# Patient Record
Sex: MALE | Race: WHITE | NOT HISPANIC OR LATINO
[De-identification: names, ages, dates, MRNs, and addresses within clinical notes are randomized per-mention and may not be internally consistent; named-entity substitution may affect disease eponyms.]

---

## 2017-01-11 ENCOUNTER — APPOINTMENT (OUTPATIENT)
Dept: ORTHOPEDIC SURGERY | Facility: CLINIC | Age: 77
End: 2017-01-11

## 2017-03-08 ENCOUNTER — APPOINTMENT (OUTPATIENT)
Dept: ORTHOPEDIC SURGERY | Facility: CLINIC | Age: 77
End: 2017-03-08

## 2017-06-14 ENCOUNTER — APPOINTMENT (OUTPATIENT)
Dept: ORTHOPEDIC SURGERY | Facility: CLINIC | Age: 77
End: 2017-06-14
Payer: COMMERCIAL

## 2017-06-14 VITALS
BODY MASS INDEX: 26.79 KG/M2 | HEART RATE: 67 BPM | HEIGHT: 76 IN | SYSTOLIC BLOOD PRESSURE: 126 MMHG | WEIGHT: 220 LBS | DIASTOLIC BLOOD PRESSURE: 84 MMHG

## 2017-06-14 PROCEDURE — 99214 OFFICE O/P EST MOD 30 MIN: CPT

## 2017-09-13 ENCOUNTER — APPOINTMENT (OUTPATIENT)
Dept: ORTHOPEDIC SURGERY | Facility: CLINIC | Age: 77
End: 2017-09-13
Payer: COMMERCIAL

## 2017-09-13 PROCEDURE — 72100 X-RAY EXAM L-S SPINE 2/3 VWS: CPT

## 2017-09-13 PROCEDURE — 99214 OFFICE O/P EST MOD 30 MIN: CPT

## 2018-03-14 ENCOUNTER — APPOINTMENT (OUTPATIENT)
Dept: ORTHOPEDIC SURGERY | Facility: CLINIC | Age: 78
End: 2018-03-14
Payer: COMMERCIAL

## 2018-03-14 DIAGNOSIS — M43.16 SPONDYLOLISTHESIS, LUMBAR REGION: ICD-10-CM

## 2018-03-14 PROCEDURE — 99214 OFFICE O/P EST MOD 30 MIN: CPT

## 2018-03-14 PROCEDURE — 72100 X-RAY EXAM L-S SPINE 2/3 VWS: CPT

## 2018-04-02 ENCOUNTER — INPATIENT (INPATIENT)
Facility: HOSPITAL | Age: 78
LOS: 8 days | Discharge: HOME | DRG: 871 | End: 2018-04-11
Attending: HOSPITALIST | Admitting: HOSPITALIST
Payer: COMMERCIAL

## 2018-04-02 VITALS
OXYGEN SATURATION: 95 % | HEART RATE: 112 BPM | HEIGHT: 77 IN | TEMPERATURE: 99 F | RESPIRATION RATE: 20 BRPM | WEIGHT: 207.01 LBS | SYSTOLIC BLOOD PRESSURE: 109 MMHG | DIASTOLIC BLOOD PRESSURE: 69 MMHG

## 2018-04-02 DIAGNOSIS — E89.0 POSTPROCEDURAL HYPOTHYROIDISM: Chronic | ICD-10-CM

## 2018-04-02 DIAGNOSIS — N39.0 URINARY TRACT INFECTION, SITE NOT SPECIFIED: ICD-10-CM

## 2018-04-02 DIAGNOSIS — R74.0 NONSPECIFIC ELEVATION OF LEVELS OF TRANSAMINASE AND LACTIC ACID DEHYDROGENASE [LDH]: ICD-10-CM

## 2018-04-02 DIAGNOSIS — A41.9 SEPSIS, UNSPECIFIED ORGANISM: ICD-10-CM

## 2018-04-02 DIAGNOSIS — I10 ESSENTIAL (PRIMARY) HYPERTENSION: ICD-10-CM

## 2018-04-02 DIAGNOSIS — E03.9 HYPOTHYROIDISM, UNSPECIFIED: ICD-10-CM

## 2018-04-02 DIAGNOSIS — Z29.9 ENCOUNTER FOR PROPHYLACTIC MEASURES, UNSPECIFIED: ICD-10-CM

## 2018-04-02 DIAGNOSIS — E87.1 HYPO-OSMOLALITY AND HYPONATREMIA: ICD-10-CM

## 2018-04-02 DIAGNOSIS — E11.8 TYPE 2 DIABETES MELLITUS WITH UNSPECIFIED COMPLICATIONS: ICD-10-CM

## 2018-04-02 DIAGNOSIS — N40.0 BENIGN PROSTATIC HYPERPLASIA WITHOUT LOWER URINARY TRACT SYMPTOMS: ICD-10-CM

## 2018-04-02 LAB
ALBUMIN SERPL ELPH-MCNC: 3.1 G/DL — LOW (ref 3.3–5)
ALP SERPL-CCNC: 192 U/L — HIGH (ref 40–120)
ALT FLD-CCNC: 60 U/L RC — HIGH (ref 10–45)
ANION GAP SERPL CALC-SCNC: 15 MMOL/L — SIGNIFICANT CHANGE UP (ref 5–17)
APPEARANCE UR: CLEAR — SIGNIFICANT CHANGE UP
APTT BLD: 23.8 SEC — LOW (ref 27.5–37.4)
AST SERPL-CCNC: 49 U/L — HIGH (ref 10–40)
BASOPHILS # BLD AUTO: 0 K/UL — SIGNIFICANT CHANGE UP (ref 0–0.2)
BILIRUB SERPL-MCNC: 1.1 MG/DL — SIGNIFICANT CHANGE UP (ref 0.2–1.2)
BILIRUB UR-MCNC: NEGATIVE — SIGNIFICANT CHANGE UP
BUN SERPL-MCNC: 29 MG/DL — HIGH (ref 7–23)
CALCIUM SERPL-MCNC: 8.8 MG/DL — SIGNIFICANT CHANGE UP (ref 8.4–10.5)
CHLORIDE SERPL-SCNC: 88 MMOL/L — LOW (ref 96–108)
CO2 SERPL-SCNC: 23 MMOL/L — SIGNIFICANT CHANGE UP (ref 22–31)
COLOR SPEC: YELLOW — SIGNIFICANT CHANGE UP
COMMENT - URINE: SIGNIFICANT CHANGE UP
CREAT SERPL-MCNC: 1.15 MG/DL — SIGNIFICANT CHANGE UP (ref 0.5–1.3)
DIFF PNL FLD: ABNORMAL
EOSINOPHIL # BLD AUTO: 0 K/UL — SIGNIFICANT CHANGE UP (ref 0–0.5)
GAS PNL BLDV: SIGNIFICANT CHANGE UP
GLUCOSE SERPL-MCNC: 241 MG/DL — HIGH (ref 70–99)
GLUCOSE UR QL: NEGATIVE — SIGNIFICANT CHANGE UP
HCT VFR BLD CALC: 38.1 % — LOW (ref 39–50)
HGB BLD-MCNC: 13 G/DL — SIGNIFICANT CHANGE UP (ref 13–17)
INR BLD: 1.21 RATIO — HIGH (ref 0.88–1.16)
KETONES UR-MCNC: NEGATIVE — SIGNIFICANT CHANGE UP
LEUKOCYTE ESTERASE UR-ACNC: NEGATIVE — SIGNIFICANT CHANGE UP
LYMPHOCYTES # BLD AUTO: 0.2 K/UL — LOW (ref 1–3.3)
LYMPHOCYTES # BLD AUTO: 1 % — LOW (ref 13–44)
MCHC RBC-ENTMCNC: 29.7 PG — SIGNIFICANT CHANGE UP (ref 27–34)
MCHC RBC-ENTMCNC: 34.2 GM/DL — SIGNIFICANT CHANGE UP (ref 32–36)
MCV RBC AUTO: 87 FL — SIGNIFICANT CHANGE UP (ref 80–100)
MONOCYTES # BLD AUTO: 1 K/UL — HIGH (ref 0–0.9)
MONOCYTES NFR BLD AUTO: 6 % — SIGNIFICANT CHANGE UP (ref 2–14)
NEUTROPHILS # BLD AUTO: 16.8 K/UL — HIGH (ref 1.8–7.4)
NEUTROPHILS NFR BLD AUTO: 90 % — HIGH (ref 43–77)
NITRITE UR-MCNC: NEGATIVE — SIGNIFICANT CHANGE UP
PH UR: 6 — SIGNIFICANT CHANGE UP (ref 5–8)
PLATELET # BLD AUTO: 166 K/UL — SIGNIFICANT CHANGE UP (ref 150–400)
POTASSIUM SERPL-MCNC: 3.5 MMOL/L — SIGNIFICANT CHANGE UP (ref 3.5–5.3)
POTASSIUM SERPL-SCNC: 3.5 MMOL/L — SIGNIFICANT CHANGE UP (ref 3.5–5.3)
PROT SERPL-MCNC: 7.2 G/DL — SIGNIFICANT CHANGE UP (ref 6–8.3)
PROT UR-MCNC: SIGNIFICANT CHANGE UP
PROTHROM AB SERPL-ACNC: 13.2 SEC — HIGH (ref 9.8–12.7)
RBC # BLD: 4.37 M/UL — SIGNIFICANT CHANGE UP (ref 4.2–5.8)
RBC # FLD: 12.3 % — SIGNIFICANT CHANGE UP (ref 10.3–14.5)
RBC CASTS # UR COMP ASSIST: SIGNIFICANT CHANGE UP /HPF (ref 0–2)
SODIUM SERPL-SCNC: 126 MMOL/L — LOW (ref 135–145)
SP GR SPEC: 1.01 — SIGNIFICANT CHANGE UP (ref 1.01–1.02)
UROBILINOGEN FLD QL: 1 MG/DL
WBC # BLD: 18 K/UL — HIGH (ref 3.8–10.5)
WBC # FLD AUTO: 18 K/UL — HIGH (ref 3.8–10.5)
WBC UR QL: SIGNIFICANT CHANGE UP /HPF (ref 0–5)

## 2018-04-02 PROCEDURE — 93010 ELECTROCARDIOGRAM REPORT: CPT

## 2018-04-02 PROCEDURE — 99285 EMERGENCY DEPT VISIT HI MDM: CPT | Mod: 25

## 2018-04-02 PROCEDURE — 99223 1ST HOSP IP/OBS HIGH 75: CPT

## 2018-04-02 PROCEDURE — 71045 X-RAY EXAM CHEST 1 VIEW: CPT | Mod: 26

## 2018-04-02 RX ORDER — INSULIN LISPRO 100/ML
VIAL (ML) SUBCUTANEOUS
Qty: 0 | Refills: 0 | Status: DISCONTINUED | OUTPATIENT
Start: 2018-04-02 | End: 2018-04-11

## 2018-04-02 RX ORDER — DEXTROSE 50 % IN WATER 50 %
12.5 SYRINGE (ML) INTRAVENOUS ONCE
Qty: 0 | Refills: 0 | Status: DISCONTINUED | OUTPATIENT
Start: 2018-04-02 | End: 2018-04-11

## 2018-04-02 RX ORDER — ATORVASTATIN CALCIUM 80 MG/1
10 TABLET, FILM COATED ORAL AT BEDTIME
Qty: 0 | Refills: 0 | Status: DISCONTINUED | OUTPATIENT
Start: 2018-04-02 | End: 2018-04-11

## 2018-04-02 RX ORDER — TAMSULOSIN HYDROCHLORIDE 0.4 MG/1
0.4 CAPSULE ORAL AT BEDTIME
Qty: 0 | Refills: 0 | Status: DISCONTINUED | OUTPATIENT
Start: 2018-04-02 | End: 2018-04-11

## 2018-04-02 RX ORDER — SODIUM CHLORIDE 9 MG/ML
1000 INJECTION INTRAMUSCULAR; INTRAVENOUS; SUBCUTANEOUS
Qty: 0 | Refills: 0 | Status: DISCONTINUED | OUTPATIENT
Start: 2018-04-02 | End: 2018-04-11

## 2018-04-02 RX ORDER — DEXTROSE 50 % IN WATER 50 %
25 SYRINGE (ML) INTRAVENOUS ONCE
Qty: 0 | Refills: 0 | Status: DISCONTINUED | OUTPATIENT
Start: 2018-04-02 | End: 2018-04-11

## 2018-04-02 RX ORDER — GLUCAGON INJECTION, SOLUTION 0.5 MG/.1ML
1 INJECTION, SOLUTION SUBCUTANEOUS ONCE
Qty: 0 | Refills: 0 | Status: DISCONTINUED | OUTPATIENT
Start: 2018-04-02 | End: 2018-04-11

## 2018-04-02 RX ORDER — SODIUM CHLORIDE 9 MG/ML
1000 INJECTION INTRAMUSCULAR; INTRAVENOUS; SUBCUTANEOUS ONCE
Qty: 0 | Refills: 0 | Status: COMPLETED | OUTPATIENT
Start: 2018-04-02 | End: 2018-04-02

## 2018-04-02 RX ORDER — SODIUM CHLORIDE 9 MG/ML
1000 INJECTION, SOLUTION INTRAVENOUS
Qty: 0 | Refills: 0 | Status: DISCONTINUED | OUTPATIENT
Start: 2018-04-02 | End: 2018-04-11

## 2018-04-02 RX ORDER — HEPARIN SODIUM 5000 [USP'U]/ML
5000 INJECTION INTRAVENOUS; SUBCUTANEOUS EVERY 8 HOURS
Qty: 0 | Refills: 0 | Status: DISCONTINUED | OUTPATIENT
Start: 2018-04-02 | End: 2018-04-11

## 2018-04-02 RX ORDER — PANTOPRAZOLE SODIUM 20 MG/1
40 TABLET, DELAYED RELEASE ORAL
Qty: 0 | Refills: 0 | Status: DISCONTINUED | OUTPATIENT
Start: 2018-04-02 | End: 2018-04-11

## 2018-04-02 RX ORDER — DEXTROSE 50 % IN WATER 50 %
1 SYRINGE (ML) INTRAVENOUS ONCE
Qty: 0 | Refills: 0 | Status: DISCONTINUED | OUTPATIENT
Start: 2018-04-02 | End: 2018-04-11

## 2018-04-02 RX ORDER — LEVOTHYROXINE SODIUM 125 MCG
125 TABLET ORAL DAILY
Qty: 0 | Refills: 0 | Status: DISCONTINUED | OUTPATIENT
Start: 2018-04-02 | End: 2018-04-11

## 2018-04-02 RX ORDER — CEFTRIAXONE 500 MG/1
1 INJECTION, POWDER, FOR SOLUTION INTRAMUSCULAR; INTRAVENOUS EVERY 24 HOURS
Qty: 0 | Refills: 0 | Status: DISCONTINUED | OUTPATIENT
Start: 2018-04-03 | End: 2018-04-04

## 2018-04-02 RX ORDER — OMEGA-3 ACID ETHYL ESTERS 1 G
4 CAPSULE ORAL DAILY
Qty: 0 | Refills: 0 | Status: DISCONTINUED | OUTPATIENT
Start: 2018-04-02 | End: 2018-04-11

## 2018-04-02 RX ORDER — CEFTRIAXONE 500 MG/1
1 INJECTION, POWDER, FOR SOLUTION INTRAMUSCULAR; INTRAVENOUS ONCE
Qty: 0 | Refills: 0 | Status: COMPLETED | OUTPATIENT
Start: 2018-04-02 | End: 2018-04-02

## 2018-04-02 RX ORDER — ASPIRIN/CALCIUM CARB/MAGNESIUM 324 MG
81 TABLET ORAL DAILY
Qty: 0 | Refills: 0 | Status: DISCONTINUED | OUTPATIENT
Start: 2018-04-02 | End: 2018-04-05

## 2018-04-02 RX ORDER — CEFTRIAXONE 500 MG/1
1 INJECTION, POWDER, FOR SOLUTION INTRAMUSCULAR; INTRAVENOUS ONCE
Qty: 0 | Refills: 0 | Status: DISCONTINUED | OUTPATIENT
Start: 2018-04-02 | End: 2018-04-02

## 2018-04-02 RX ADMIN — TAMSULOSIN HYDROCHLORIDE 0.4 MILLIGRAM(S): 0.4 CAPSULE ORAL at 21:39

## 2018-04-02 RX ADMIN — ATORVASTATIN CALCIUM 10 MILLIGRAM(S): 80 TABLET, FILM COATED ORAL at 21:39

## 2018-04-02 RX ADMIN — CEFTRIAXONE 100 GRAM(S): 500 INJECTION, POWDER, FOR SOLUTION INTRAMUSCULAR; INTRAVENOUS at 13:21

## 2018-04-02 RX ADMIN — HEPARIN SODIUM 5000 UNIT(S): 5000 INJECTION INTRAVENOUS; SUBCUTANEOUS at 21:39

## 2018-04-02 RX ADMIN — SODIUM CHLORIDE 50 MILLILITER(S): 9 INJECTION INTRAMUSCULAR; INTRAVENOUS; SUBCUTANEOUS at 21:39

## 2018-04-02 RX ADMIN — SODIUM CHLORIDE 1000 MILLILITER(S): 9 INJECTION INTRAMUSCULAR; INTRAVENOUS; SUBCUTANEOUS at 11:29

## 2018-04-02 RX ADMIN — HEPARIN SODIUM 5000 UNIT(S): 5000 INJECTION INTRAVENOUS; SUBCUTANEOUS at 17:56

## 2018-04-02 RX ADMIN — Medication 2: at 17:56

## 2018-04-02 NOTE — ED PROVIDER NOTE - CARE PLAN
Principal Discharge DX:	Urinary tract infection without hematuria, site unspecified  Secondary Diagnosis:	Hyponatremia

## 2018-04-02 NOTE — H&P ADULT - NSHPLABSRESULTS_GEN_ALL_CORE
Labs reviewed : leukocytosis, hyponatremia, UA negative for UTI ( after 3 days of PO abx), elevated LFTs    EKG personally reviewed : NSR , hr 99 bpm, no acute ischemic changes

## 2018-04-02 NOTE — ED PROVIDER NOTE - MEDICAL DECISION MAKING DETAILS
79 y/o M with pmhx of DM who is brought in by family for several days of mild mental status changes, unsteady gait in the setting of recently diagnosed UTI. Per wife, pt developed some diarrhea on Thursday 3/29 and had decreased appetite. Saw PMD on Saturday and had lab work done and was diagnosed with UTI and started on Cipro. Since then, pt has not been improving, has had urinary urgency, unable to make it to the bathroom, is not himself and has gait instability. They spoke with PMD who advised him to come to the ER for an evaluation. Pt has also been having sweats and chills and mild dry cough. Differential diagnosis: UTI, dehydration, metabolic disorder. Plan: obtain basic labs, UA, Urine culture, blood cultures. Will administer IV fluids, likely also abx and will likely admit.

## 2018-04-02 NOTE — H&P ADULT - PROBLEM SELECTOR PLAN 1
pt presents with urinary frequency and incontinence in the setting of AMS  UA currently negative for UTI after Ciprofloxacin x 3 days  will c/w Ceftriaxone 1g IVPB qd for now  follow bld cx, urine cx

## 2018-04-02 NOTE — ED PROVIDER NOTE - OBJECTIVE STATEMENT
77 y/o M with pmhx of DM, HTN, GERD  and pshx of partial thyroidectomy presents to ED with mild altered status  x4 days. Per family, pt has "not been himself lately" and has shown increased weakness and incoherency.  Associated symptoms include intermittent chills and sweats, productive cough, unsteady gait, decreased PO intake, an episode of diarrhea on 3/29/18 and the  inability to control urinary urges . Family took him to [facility] over the weekend where blood and urine work was done. Was seen by Dr. Whitfield on Saturday 3/31 and diagnosed with UTI. Pt was rx Cipro and  advised to go to the ED to get an IV, as presenting symptoms might be related to low electrolytes. Per daughter, he was given some Gatorade and oatmeal this morning to which the symptoms resolved slightly.  Pt denies vomiting, abd pain, SOB, CP, recent sick contact. Has no history of cardiac disease.  PMD: Dr. Summers [facility]

## 2018-04-02 NOTE — H&P ADULT - ASSESSMENT
78M with h/o DMT2, HTN, GERD , Hypothyroidism ( 2/2 partial thyroidectomy) who presents with mild altered status, urinary frequency and incontinence  x 4 days. He c/o associated intermittent chills and sweats, productive cough, unsteady gait and decreased PO intake. Physical exam is notable for AAO X 3, no abd tenderness , no flank tenderness. Labs are notable for leukocytosis, hyponatremia, UA negative for UTI ( after 3 days of PO abx) and elevated LFTs. EKG is NSR with no acute ischemic changes.

## 2018-04-02 NOTE — H&P ADULT - HISTORY OF PRESENT ILLNESS
78M with h/o DMT2, HTN, GERD , Hypothyroidism ( 2/2 partial thyroidectomy) who  presents with mild altered status, urinary frequency and incontinence  x 4 days. Per family, pt has speaking incoherently and appearing confused . He has also been lethargic. He reports frequency and urinary incontinence. Family  also reports pt having about 3 - 4 episodes of loose bowel movements one day prior to onset of confusion. He c/o associated intermittent chills and sweats, productive cough, unsteady gait and decreased PO intake. Pt denies nausea/vomiting, abd pain, dysuria, flank pain, SOB, CP, recent sick contact.    Family took him to [employer] over the weekend where blood and urine labs were done. He was diagnosed with UTI and started on  Ciprofloxacin which he has taken for the past 3 days. Family reports mild improvement in symptoms but today he was noted to much weaker and unsteady on his feet. They called the PMD who recommended coming to the ED for further evaluation and IV antibiotics.      Currently, per family, he appears to be close to his baseline mental status.       ED course  VS : 109/69  112  20  O2 95% on room air T 99.3F  Labs : wbc 18  h/h 13/36 plt 166 Na 126  bun/cr 29/1.15    AST 49  ALT 60  UA : nitrite (neg) , LE (neg)  Treatment : IVF NS 1L x 1, Ceftriaxone 1 g IVPB x 1

## 2018-04-02 NOTE — PATIENT PROFILE ADULT. - ABILITY TO HEAR (WITH HEARING AID OR HEARING APPLIANCE IF NORMALLY USED):
uses b/l hearing aides/Mildly to Moderately Impaired: difficulty hearing in some environments or speaker may need to increase volume or speak distinctly

## 2018-04-02 NOTE — ED PROVIDER NOTE - PMH
BPH (benign prostatic hypertrophy)    Diabetes    GERD (gastroesophageal reflux disease)    Hypertension    Overactive bladder    Spinal stenosis    Thyroid nodule

## 2018-04-02 NOTE — ED ADULT NURSE NOTE - OBJECTIVE STATEMENT
Pt is an ambulatory 78 yr old male a/oX 3 c/o weakness that started on thursday with an episode of diarrhea.  Pt was seen at urgent care and diagnosed with a UTI and placed on cipro.  PERRL wnl, ruiz with equal strength.  LUNGS CTA.  no chest pain or sob.  Denies fevers, although patient states they felt warm and diaphoretic yesterday.  Abdomen NT ND with BS+4Q.  SKIN WDI.  Peripheral pulses +2bl no edema

## 2018-04-02 NOTE — H&P ADULT - NEGATIVE CARDIOVASCULAR SYMPTOMS
no palpitations/no chest pain/no dyspnea on exertion/no peripheral edema/no orthopnea/no paroxysmal nocturnal dyspnea

## 2018-04-02 NOTE — H&P ADULT - FAMILY HISTORY
Family history of heart disease     Father  Still living? Unknown  Family history of hypertension, Age at diagnosis: Age Unknown

## 2018-04-02 NOTE — H&P ADULT - PROBLEM SELECTOR PLAN 3
meets sepsis criteria with tachycardia and leukocytosis  possible source is urine  will check CXR  f/up bld cx, urine cx  c/w Ceftriaxone  c/w IVF

## 2018-04-02 NOTE — ED PROVIDER NOTE - PROGRESS NOTE DETAILS
Discussed pt with Dr. Nevarez from [facility]. Labs show WBC 18K, Na 126, Cl 88. U/A pending at this time. Will cover with ceftriaxone for likely UTI once we obtain urine for u/a and cx. Pt will require inpt admission for IV abx and IVF given electrolyte abnormalities, weakness, gait disturbance, mental status changes and UTI. Pt accepted by Dr. Nevarez.

## 2018-04-03 ENCOUNTER — TRANSCRIPTION ENCOUNTER (OUTPATIENT)
Age: 78
End: 2018-04-03

## 2018-04-03 LAB
ALBUMIN SERPL ELPH-MCNC: 3.1 G/DL — LOW (ref 3.3–5)
ALP SERPL-CCNC: 175 U/L — HIGH (ref 40–120)
ALT FLD-CCNC: 43 U/L RC — SIGNIFICANT CHANGE UP (ref 10–45)
ANION GAP SERPL CALC-SCNC: 12 MMOL/L — SIGNIFICANT CHANGE UP (ref 5–17)
AST SERPL-CCNC: 33 U/L — SIGNIFICANT CHANGE UP (ref 10–40)
BASOPHILS # BLD AUTO: 0.02 K/UL — SIGNIFICANT CHANGE UP (ref 0–0.2)
BASOPHILS NFR BLD AUTO: 0.1 % — SIGNIFICANT CHANGE UP (ref 0–2)
BILIRUB SERPL-MCNC: 0.7 MG/DL — SIGNIFICANT CHANGE UP (ref 0.2–1.2)
BUN SERPL-MCNC: 21 MG/DL — SIGNIFICANT CHANGE UP (ref 7–23)
CALCIUM SERPL-MCNC: 8.6 MG/DL — SIGNIFICANT CHANGE UP (ref 8.4–10.5)
CHLORIDE SERPL-SCNC: 94 MMOL/L — LOW (ref 96–108)
CO2 SERPL-SCNC: 27 MMOL/L — SIGNIFICANT CHANGE UP (ref 22–31)
CREAT SERPL-MCNC: 0.98 MG/DL — SIGNIFICANT CHANGE UP (ref 0.5–1.3)
CULTURE RESULTS: NO GROWTH — SIGNIFICANT CHANGE UP
EOSINOPHIL # BLD AUTO: 0 K/UL — SIGNIFICANT CHANGE UP (ref 0–0.5)
EOSINOPHIL NFR BLD AUTO: 0 % — SIGNIFICANT CHANGE UP (ref 0–6)
GLUCOSE SERPL-MCNC: 120 MG/DL — HIGH (ref 70–99)
HBA1C BLD-MCNC: 6 % — HIGH (ref 4–5.6)
HCT VFR BLD CALC: 35.2 % — LOW (ref 39–50)
HGB BLD-MCNC: 11.5 G/DL — LOW (ref 13–17)
IMM GRANULOCYTES NFR BLD AUTO: 0.5 % — SIGNIFICANT CHANGE UP (ref 0–1.5)
LYMPHOCYTES # BLD AUTO: 0.99 K/UL — LOW (ref 1–3.3)
LYMPHOCYTES # BLD AUTO: 6.6 % — LOW (ref 13–44)
MCHC RBC-ENTMCNC: 27.8 PG — SIGNIFICANT CHANGE UP (ref 27–34)
MCHC RBC-ENTMCNC: 32.7 GM/DL — SIGNIFICANT CHANGE UP (ref 32–36)
MCV RBC AUTO: 85 FL — SIGNIFICANT CHANGE UP (ref 80–100)
MONOCYTES # BLD AUTO: 1.16 K/UL — HIGH (ref 0–0.9)
MONOCYTES NFR BLD AUTO: 7.7 % — SIGNIFICANT CHANGE UP (ref 2–14)
NEUTROPHILS # BLD AUTO: 12.86 K/UL — HIGH (ref 1.8–7.4)
NEUTROPHILS NFR BLD AUTO: 85.1 % — HIGH (ref 43–77)
PLATELET # BLD AUTO: 186 K/UL — SIGNIFICANT CHANGE UP (ref 150–400)
POTASSIUM SERPL-MCNC: 3.4 MMOL/L — LOW (ref 3.5–5.3)
POTASSIUM SERPL-SCNC: 3.4 MMOL/L — LOW (ref 3.5–5.3)
PROT SERPL-MCNC: 6.9 G/DL — SIGNIFICANT CHANGE UP (ref 6–8.3)
RBC # BLD: 4.14 M/UL — LOW (ref 4.2–5.8)
RBC # FLD: 13.8 % — SIGNIFICANT CHANGE UP (ref 10.3–14.5)
SODIUM SERPL-SCNC: 133 MMOL/L — LOW (ref 135–145)
SPECIMEN SOURCE: SIGNIFICANT CHANGE UP
WBC # BLD: 15.11 K/UL — HIGH (ref 3.8–10.5)
WBC # FLD AUTO: 15.11 K/UL — HIGH (ref 3.8–10.5)

## 2018-04-03 PROCEDURE — 74160 CT ABDOMEN W/CONTRAST: CPT | Mod: 26

## 2018-04-03 PROCEDURE — 76700 US EXAM ABDOM COMPLETE: CPT | Mod: 26

## 2018-04-03 RX ORDER — POTASSIUM CHLORIDE 20 MEQ
20 PACKET (EA) ORAL
Qty: 0 | Refills: 0 | Status: COMPLETED | OUTPATIENT
Start: 2018-04-03 | End: 2018-04-03

## 2018-04-03 RX ADMIN — Medication 20 MILLIEQUIVALENT(S): at 17:04

## 2018-04-03 RX ADMIN — TAMSULOSIN HYDROCHLORIDE 0.4 MILLIGRAM(S): 0.4 CAPSULE ORAL at 21:45

## 2018-04-03 RX ADMIN — ATORVASTATIN CALCIUM 10 MILLIGRAM(S): 80 TABLET, FILM COATED ORAL at 21:45

## 2018-04-03 RX ADMIN — CEFTRIAXONE 100 GRAM(S): 500 INJECTION, POWDER, FOR SOLUTION INTRAMUSCULAR; INTRAVENOUS at 13:19

## 2018-04-03 RX ADMIN — HEPARIN SODIUM 5000 UNIT(S): 5000 INJECTION INTRAVENOUS; SUBCUTANEOUS at 13:13

## 2018-04-03 RX ADMIN — Medication 81 MILLIGRAM(S): at 13:12

## 2018-04-03 RX ADMIN — HEPARIN SODIUM 5000 UNIT(S): 5000 INJECTION INTRAVENOUS; SUBCUTANEOUS at 05:38

## 2018-04-03 RX ADMIN — Medication 1 TABLET(S): at 13:12

## 2018-04-03 RX ADMIN — Medication 4 GRAM(S): at 13:12

## 2018-04-03 RX ADMIN — HEPARIN SODIUM 5000 UNIT(S): 5000 INJECTION INTRAVENOUS; SUBCUTANEOUS at 21:46

## 2018-04-03 RX ADMIN — Medication 125 MICROGRAM(S): at 05:38

## 2018-04-03 RX ADMIN — Medication 20 MILLIEQUIVALENT(S): at 18:35

## 2018-04-03 RX ADMIN — Medication 20 MILLIEQUIVALENT(S): at 13:18

## 2018-04-03 RX ADMIN — PANTOPRAZOLE SODIUM 40 MILLIGRAM(S): 20 TABLET, DELAYED RELEASE ORAL at 06:10

## 2018-04-03 NOTE — PHYSICAL THERAPY INITIAL EVALUATION ADULT - PLANNED THERAPY INTERVENTIONS, PT EVAL
balance training/gait training/Stair training: Goal: Pt. will negotiate 5 steps independently in 1 week

## 2018-04-03 NOTE — PHYSICAL THERAPY INITIAL EVALUATION ADULT - PERTINENT HX OF CURRENT PROBLEM, REHAB EVAL
Pt. 78 year old male admitted 4-2-18 with confusion, lethargy and urinary frequency and incontinence

## 2018-04-03 NOTE — DISCHARGE NOTE ADULT - CARE PLAN
Principal Discharge DX:	Sepsis, due to unspecified organism  Goal:	resolution of symptoms  Assessment and plan of treatment:	Take all antibiotics as ordered.  Call you Health care provider upon arrival home to make a one week follow up appointment.  If you develop fever, chills, malaise, or change in mental status call your Health Care Provider or go to the Emergency Department.  Nutrition is important, eat small frequent meals to help ensure you get adequate calories.  Do not stay in bed all day!  Increase your activity daily as tolerated.  Secondary Diagnosis:	Elevated transaminase level  Assessment and plan of treatment:	follow up with PMD  continue to monitor liver function tests  Secondary Diagnosis:	Essential hypertension  Assessment and plan of treatment:	Follow up with your medical doctor to establish long term blood pressure treatment goals.  Secondary Diagnosis:	Liver abscess  Assessment and plan of treatment:	continue antibiotics until 4/25/18  follow up with Dr. Kim in 2 weeks  Secondary Diagnosis:	Type 2 diabetes mellitus with complication, without long-term current use of insulin  Assessment and plan of treatment:	HgA1C this admission.  Make sure you get your HgA1c checked every three months.  If you take oral diabetes medications, check your blood glucose two times a day.  If you take insulin, check your blood glucose before meals and at bedtime.  It's important not to skip any meals.  Keep a log of your blood glucose results and always take it with you to your doctor appointments.  Keep a list of your current medications including injectables and over the counter medications and bring this medication list with you to all your doctor appointments.  If you have not seen your ophthalmologist this year call for appointment.  Check your feet daily for redness, sores, or openings. Do not self treat. If no improvement in two days call your primary care physician for an appointment.  Low blood sugar (hypoglycemia) is a blood sugar below 70mg/dl. Check your blood sugar if you feel signs/symptoms of hypoglycemia. If your blood sugar is below 70 take 15 grams of carbohydrates (ex 4 oz of apple juice, 3-4 glucose tablets, or 4-6 oz of regular soda) wait 15 minutes and repeat blood sugar to make sure it comes up above 70.  If your blood sugar is above 70 and you are due for a meal, have a meal.  If you are not due for a meal have a snack.  This snack helps keeps your blood sugar at a safe range.  Secondary Diagnosis:	Urinary tract infection without hematuria, site unspecified  Assessment and plan of treatment:	HOME CARE INSTRUCTIONS  f you were prescribed antibiotics, take them exactly as your caregiver instructs you. Finish the medication even if you feel better after you have only taken some of the medication.  Drink enough water and fluids to keep your urine clear or pale yellow.  Avoid caffeine, tea, and carbonated beverages. They tend to irritate your bladder.  Empty your bladder often. Avoid holding urine for long periods of time.  Empty your bladder before and after sexual intercourse.  After a bowel movement, women should cleanse from front to back. Use each tissue only once.  SEEK MEDICAL CARE IF:  You have back pain.  You develop a fever.  Your symptoms do not begin to resolve within 3 days.  SEEK IMMEDIATE MEDICAL CARE IF:  You have severe back pain or lower abdominal pain.  You develop chills.  You have nausea or vomiting.  You have continued burning or discomfort with urination.

## 2018-04-03 NOTE — DISCHARGE NOTE ADULT - PLAN OF CARE
resolution of symptoms Take all antibiotics as ordered.  Call you Health care provider upon arrival home to make a one week follow up appointment.  If you develop fever, chills, malaise, or change in mental status call your Health Care Provider or go to the Emergency Department.  Nutrition is important, eat small frequent meals to help ensure you get adequate calories.  Do not stay in bed all day!  Increase your activity daily as tolerated. follow up with PMD  continue to monitor liver function tests Follow up with your medical doctor to establish long term blood pressure treatment goals. continue antibiotics until 4/25/18  follow up with Dr. Kim in 2 weeks HgA1C this admission.  Make sure you get your HgA1c checked every three months.  If you take oral diabetes medications, check your blood glucose two times a day.  If you take insulin, check your blood glucose before meals and at bedtime.  It's important not to skip any meals.  Keep a log of your blood glucose results and always take it with you to your doctor appointments.  Keep a list of your current medications including injectables and over the counter medications and bring this medication list with you to all your doctor appointments.  If you have not seen your ophthalmologist this year call for appointment.  Check your feet daily for redness, sores, or openings. Do not self treat. If no improvement in two days call your primary care physician for an appointment.  Low blood sugar (hypoglycemia) is a blood sugar below 70mg/dl. Check your blood sugar if you feel signs/symptoms of hypoglycemia. If your blood sugar is below 70 take 15 grams of carbohydrates (ex 4 oz of apple juice, 3-4 glucose tablets, or 4-6 oz of regular soda) wait 15 minutes and repeat blood sugar to make sure it comes up above 70.  If your blood sugar is above 70 and you are due for a meal, have a meal.  If you are not due for a meal have a snack.  This snack helps keeps your blood sugar at a safe range. HOME CARE INSTRUCTIONS  f you were prescribed antibiotics, take them exactly as your caregiver instructs you. Finish the medication even if you feel better after you have only taken some of the medication.  Drink enough water and fluids to keep your urine clear or pale yellow.  Avoid caffeine, tea, and carbonated beverages. They tend to irritate your bladder.  Empty your bladder often. Avoid holding urine for long periods of time.  Empty your bladder before and after sexual intercourse.  After a bowel movement, women should cleanse from front to back. Use each tissue only once.  SEEK MEDICAL CARE IF:  You have back pain.  You develop a fever.  Your symptoms do not begin to resolve within 3 days.  SEEK IMMEDIATE MEDICAL CARE IF:  You have severe back pain or lower abdominal pain.  You develop chills.  You have nausea or vomiting.  You have continued burning or discomfort with urination.

## 2018-04-03 NOTE — PROGRESS NOTE ADULT - PROBLEM SELECTOR PLAN 3
meets sepsis criteria with tachycardia and leukocytosis  possible source is urine  CXR without consolidation.   bld cx pending, urine cx neg.  c/w abx as above.

## 2018-04-03 NOTE — DISCHARGE NOTE ADULT - SECONDARY DIAGNOSIS.
Elevated transaminase level Essential hypertension Liver abscess Type 2 diabetes mellitus with complication, without long-term current use of insulin Urinary tract infection without hematuria, site unspecified

## 2018-04-03 NOTE — DISCHARGE NOTE ADULT - MEDICATION SUMMARY - MEDICATIONS TO TAKE
I will START or STAY ON the medications listed below when I get home from the hospital:    calcium 750mg tablet  -- 1 tab(s) by mouth once a day  -- Indication: For Supplement    Flagyl 500 mg oral tablet  -- 1 tab(s) by mouth 3 times a day   -- Do not drink alcoholic beverages when taking this medication.  Finish all this medication unless otherwise directed by prescriber.  May discolor urine or feces.    -- Indication: For Sepsis, due to unspecified organism    aspirin 81 mg oral tablet  -- 1 tab(s) by mouth once a day.  -- Indication: For Prophylactic measure    losartan 50 mg oral tablet  -- 1 tab(s) by mouth once a day  -- Indication: For Htn    tamsulosin 0.4 mg oral capsule  -- 1 cap(s) by mouth once a day pm  -- Indication: For Bph    Janumet 1000 mg-50 mg oral tablet  -- 1 tab(s) by mouth 2 times a day  -- Indication: For diabetes    lovastatin 20 mg oral tablet  -- 1 tab(s) by mouth once a day pm  -- Indication: For Hld    cefuroxime 500 mg oral tablet  -- 1 tab(s) by mouth 2 times a day   -- Finish all this medication unless otherwise directed by prescriber.  Medication should be taken with plenty of water.  Take with food or milk.    -- Indication: For Sepsis, due to unspecified organism    Cinnamon  -- 1000 milligram(s) caps by mouth  once a day  -- Indication: For Supplement    Fish Oil 1200 mg oral capsule  -- 1 cap(s) by mouth once a day  -- Indication: For Supplement    omeprazole 20 mg oral delayed release capsule  -- 1 cap(s) by mouth once a day  -- Indication: For gerd    Synthroid 125 mcg (0.125 mg) oral tablet  -- 1 tab(s) by mouth once a day  -- Indication: For Hypothyroidism, unspecified type    multivitamin  -- 1 tab(s) by mouth once a day 11/21/2016  -- Indication: For Supplement    Ocuvite oral tablet  -- 1 tab(s) by mouth once a day  -- Indication: For Supplement

## 2018-04-03 NOTE — DISCHARGE NOTE ADULT - HOSPITAL COURSE
78-yo Male with h/o DMT2, HTN, GERD, Hypothyroidism ( 2/2 partial thyroidectomy) who presents with mild altered status, urinary frequency and incontinence  x 4 days.        Problem/Plan - 1:  ·  Problem: Urinary tract infection without hematuria, site unspecified.  Plan: pt presents with urinary frequency and incontinence in the setting of AMS  UA currently negative for UTI after Ciprofloxacin x 3 days as outpt.      Problem/Plan - 2:  ·  Problem: Hyponatremia.  Plan: in the setting of poor PO intake  improving.      Problem/Plan - 3:  ·  Problem: Sepsis, due to unspecified organism.  Plan: meets sepsis criteria with tachycardia and leukocytosis  rt hepatic abscess  s/p drainage-  Fluid cx ngtd to f/u  ID f/u noted  cont ceftriaxone.      Problem/Plan - 4:  ·  Problem: Elevated transaminase level.  Plan: likely 2/2 ongoing sepsis   and hepatic abscess  monitor LFTs  CT abdomen/pelvis reviewed.

## 2018-04-03 NOTE — PROGRESS NOTE ADULT - SUBJECTIVE AND OBJECTIVE BOX
Patient is a 78y old  Male who presents with a chief complaint of UTI, leukocytosis (2018 12:40)      SUBJECTIVE / OVERNIGHT EVENTS:  Pt seen and examined at bedside.   No overnight event. Feeling better.  the wife and the daughter at bedside.  no cp, no sob, no n/v/d.   Per the family, back to mental status baseline.   very functional, He was still driving up to this age.       Vital Signs Last 24 Hrs  T(C): 37.5 (2018 05:36), Max: 37.5 (2018 05:36)  T(F): 99.5 (2018 05:36), Max: 99.5 (2018 05:36)  HR: 88 (2018 18:36) (88 - 95)  BP: 120/74 (2018 18:36) (120/74 - 142/75)  BP(mean): --  RR: 18 (2018 05:36) (18 - 18)  SpO2: 95% (2018 05:36) (95% - 96%)  I&O's Summary    2018 07:01  -  2018 07:00  --------------------------------------------------------  IN: 420 mL / OUT: 0 mL / NET: 420 mL    2018 07:01  -  2018 19:28  --------------------------------------------------------  IN: 250 mL / OUT: 950 mL / NET: -700 mL        PHYSICAL EXAM:  GENERAL: NAD, Comfortable  HEAD:  Atraumatic, Normocephalic  EYES: EOMI, PERRLA, conjunctiva and sclera clear  NECK: Supple, No JVD  CHEST/LUNG: Clear to auscultation bilaterally; No wheeze  HEART: Regular rate and rhythm; No murmurs, rubs, or gallops  ABDOMEN: Soft, Nontender, Nondistended; Bowel sounds present  Neuro: AAO x 3, no focal deficit, 5/5 b/l extremities  EXTREMITIES:  2+ Peripheral Pulses, No clubbing, cyanosis, or edema  SKIN: No rashes or lesions    LABS:                        11.5   15.11 )-----------( 186      ( 2018 07:22 )             35.2         133<L>  |  94<L>  |  21  ----------------------------<  120<H>  3.4<L>   |  27  |  0.98    Ca    8.6      2018 06:45    TPro  6.9  /  Alb  3.1<L>  /  TBili  0.7  /  DBili  x   /  AST  33  /  ALT  43  /  AlkPhos  175<H>      PT/INR - ( 2018 11:35 )   PT: 13.2 sec;   INR: 1.21 ratio         PTT - ( 2018 11:35 )  PTT:23.8 sec  CAPILLARY BLOOD GLUCOSE      POCT Blood Glucose.: 144 mg/dL (2018 16:56)  POCT Blood Glucose.: 113 mg/dL (2018 13:09)  POCT Blood Glucose.: 123 mg/dL (2018 07:49)  POCT Blood Glucose.: 98 mg/dL (2018 21:32)        Urinalysis Basic - ( 2018 12:33 )    Color: Yellow / Appearance: Clear / S.010 / pH: x  Gluc: x / Ketone: Negative  / Bili: Negative / Urobili: 1 mg/dL   Blood: x / Protein: Trace / Nitrite: Negative   Leuk Esterase: Negative / RBC: 3-5 /HPF / WBC 0-2 /HPF   Sq Epi: x / Non Sq Epi: x / Bacteria: x        RADIOLOGY & ADDITIONAL TESTS:    Imaging Personally Reviewed:  [x] YES  [ ] NO    Consultant(s) Notes Reviewed:  [x] YES  [ ] NO      MEDICATIONS  (STANDING):  aspirin enteric coated 81 milliGRAM(s) Oral daily  atorvastatin 10 milliGRAM(s) Oral at bedtime  cefTRIAXone   IVPB 1 Gram(s) IV Intermittent every 24 hours  dextrose 5%. 1000 milliLiter(s) (50 mL/Hr) IV Continuous <Continuous>  dextrose 50% Injectable 12.5 Gram(s) IV Push once  dextrose 50% Injectable 25 Gram(s) IV Push once  dextrose 50% Injectable 25 Gram(s) IV Push once  heparin  Injectable 5000 Unit(s) SubCutaneous every 8 hours  insulin lispro (HumaLOG) corrective regimen sliding scale   SubCutaneous three times a day before meals  levothyroxine 125 MICROGram(s) Oral daily  multivitamin 1 Tablet(s) Oral daily  omega-3-Acid Ethyl Esters 4 Gram(s) Oral daily  pantoprazole    Tablet 40 milliGRAM(s) Oral before breakfast  sodium chloride 0.9%. 1000 milliLiter(s) (50 mL/Hr) IV Continuous <Continuous>  tamsulosin 0.4 milliGRAM(s) Oral at bedtime    MEDICATIONS  (PRN):  dextrose Gel 1 Dose(s) Oral once PRN Blood Glucose LESS THAN 70 milliGRAM(s)/deciliter  glucagon  Injectable 1 milliGRAM(s) IntraMuscular once PRN Glucose LESS THAN 70 milligrams/deciliter  guaiFENesin   Syrup  (Sugar-Free) 100 milliGRAM(s) Oral every 6 hours PRN Cough      Care Discussed with Consultants/Other Providers [x] YES  [ ] NO    HEALTH ISSUES - PROBLEM Dx:  Prophylactic measure: Prophylactic measure  Benign prostatic hyperplasia, unspecified whether lower urinary tract symptoms present: Benign prostatic hyperplasia, unspecified whether lower urinary tract symptoms present  Essential hypertension: Essential hypertension  Hypothyroidism, unspecified type: Hypothyroidism, unspecified type  Type 2 diabetes mellitus with complication, without long-term current use of insulin: Type 2 diabetes mellitus with complication, without long-term current use of insulin  Elevated transaminase level: Elevated transaminase level  Sepsis, due to unspecified organism: Sepsis, due to unspecified organism  Hyponatremia: Hyponatremia  Urinary tract infection without hematuria, site unspecified: Urinary tract infection without hematuria, site unspecified

## 2018-04-03 NOTE — DISCHARGE NOTE ADULT - CARE PROVIDERS DIRECT ADDRESSES
,DirectAddress_Unknown,DirectAddress_Unknown,[email or web address]

## 2018-04-03 NOTE — DISCHARGE NOTE ADULT - CARE PROVIDER_API CALL
Elvis Cruz), Infectious Disease; Internal Medicine  [address]  [address]  [place] [address]  Phone: [phone]  Fax: [phone]    Roberto Summers), [address] [address]  Phone: [phone]  Fax: [phone]    Misael Vazquez), Diagnostic Radiology; [facility]  [address] [place] [id number]  Phone: [phone]  Fax: [phone]

## 2018-04-03 NOTE — DISCHARGE NOTE ADULT - PATIENT PORTAL LINK FT
You can access the [facility] Patient Portal, offered by [facility], by registering with the following website: [email or web address]

## 2018-04-04 DIAGNOSIS — K75.0 ABSCESS OF LIVER: ICD-10-CM

## 2018-04-04 LAB
ANION GAP SERPL CALC-SCNC: 12 MMOL/L — SIGNIFICANT CHANGE UP (ref 5–17)
BUN SERPL-MCNC: 18 MG/DL — SIGNIFICANT CHANGE UP (ref 7–23)
CALCIUM SERPL-MCNC: 8.9 MG/DL — SIGNIFICANT CHANGE UP (ref 8.4–10.5)
CHLORIDE SERPL-SCNC: 95 MMOL/L — LOW (ref 96–108)
CO2 SERPL-SCNC: 26 MMOL/L — SIGNIFICANT CHANGE UP (ref 22–31)
CREAT SERPL-MCNC: 0.91 MG/DL — SIGNIFICANT CHANGE UP (ref 0.5–1.3)
GLUCOSE SERPL-MCNC: 114 MG/DL — HIGH (ref 70–99)
HCT VFR BLD CALC: 35.9 % — LOW (ref 39–50)
HGB BLD-MCNC: 11.7 G/DL — LOW (ref 13–17)
MCHC RBC-ENTMCNC: 28.1 PG — SIGNIFICANT CHANGE UP (ref 27–34)
MCHC RBC-ENTMCNC: 32.6 GM/DL — SIGNIFICANT CHANGE UP (ref 32–36)
MCV RBC AUTO: 86.1 FL — SIGNIFICANT CHANGE UP (ref 80–100)
NRBC # BLD: 0 /100 WBCS — SIGNIFICANT CHANGE UP (ref 0–0)
PLATELET # BLD AUTO: 215 K/UL — SIGNIFICANT CHANGE UP (ref 150–400)
POTASSIUM SERPL-MCNC: 3.7 MMOL/L — SIGNIFICANT CHANGE UP (ref 3.5–5.3)
POTASSIUM SERPL-SCNC: 3.7 MMOL/L — SIGNIFICANT CHANGE UP (ref 3.5–5.3)
RBC # BLD: 4.17 M/UL — LOW (ref 4.2–5.8)
RBC # FLD: 13.8 % — SIGNIFICANT CHANGE UP (ref 10.3–14.5)
SODIUM SERPL-SCNC: 133 MMOL/L — LOW (ref 135–145)
WBC # BLD: 10.52 K/UL — HIGH (ref 3.8–10.5)
WBC # FLD AUTO: 10.52 K/UL — HIGH (ref 3.8–10.5)

## 2018-04-04 RX ORDER — CEFTRIAXONE 500 MG/1
2 INJECTION, POWDER, FOR SOLUTION INTRAMUSCULAR; INTRAVENOUS ONCE
Qty: 0 | Refills: 0 | Status: COMPLETED | OUTPATIENT
Start: 2018-04-04 | End: 2018-04-04

## 2018-04-04 RX ORDER — METRONIDAZOLE 500 MG
500 TABLET ORAL EVERY 8 HOURS
Qty: 0 | Refills: 0 | Status: DISCONTINUED | OUTPATIENT
Start: 2018-04-04 | End: 2018-04-11

## 2018-04-04 RX ORDER — CEFTRIAXONE 500 MG/1
2 INJECTION, POWDER, FOR SOLUTION INTRAMUSCULAR; INTRAVENOUS ONCE
Qty: 0 | Refills: 0 | Status: DISCONTINUED | OUTPATIENT
Start: 2018-04-04 | End: 2018-04-04

## 2018-04-04 RX ORDER — CEFTRIAXONE 500 MG/1
INJECTION, POWDER, FOR SOLUTION INTRAMUSCULAR; INTRAVENOUS
Qty: 0 | Refills: 0 | Status: DISCONTINUED | OUTPATIENT
Start: 2018-04-04 | End: 2018-04-04

## 2018-04-04 RX ORDER — CEFTRIAXONE 500 MG/1
INJECTION, POWDER, FOR SOLUTION INTRAMUSCULAR; INTRAVENOUS
Qty: 0 | Refills: 0 | Status: DISCONTINUED | OUTPATIENT
Start: 2018-04-04 | End: 2018-04-11

## 2018-04-04 RX ORDER — CEFTRIAXONE 500 MG/1
2 INJECTION, POWDER, FOR SOLUTION INTRAMUSCULAR; INTRAVENOUS EVERY 24 HOURS
Qty: 0 | Refills: 0 | Status: DISCONTINUED | OUTPATIENT
Start: 2018-04-05 | End: 2018-04-11

## 2018-04-04 RX ADMIN — HEPARIN SODIUM 5000 UNIT(S): 5000 INJECTION INTRAVENOUS; SUBCUTANEOUS at 14:28

## 2018-04-04 RX ADMIN — PANTOPRAZOLE SODIUM 40 MILLIGRAM(S): 20 TABLET, DELAYED RELEASE ORAL at 06:01

## 2018-04-04 RX ADMIN — CEFTRIAXONE 100 GRAM(S): 500 INJECTION, POWDER, FOR SOLUTION INTRAMUSCULAR; INTRAVENOUS at 11:47

## 2018-04-04 RX ADMIN — Medication 500 MILLIGRAM(S): at 14:33

## 2018-04-04 RX ADMIN — Medication 1 TABLET(S): at 11:51

## 2018-04-04 RX ADMIN — TAMSULOSIN HYDROCHLORIDE 0.4 MILLIGRAM(S): 0.4 CAPSULE ORAL at 21:24

## 2018-04-04 RX ADMIN — Medication 125 MICROGRAM(S): at 06:01

## 2018-04-04 RX ADMIN — HEPARIN SODIUM 5000 UNIT(S): 5000 INJECTION INTRAVENOUS; SUBCUTANEOUS at 21:24

## 2018-04-04 RX ADMIN — Medication 81 MILLIGRAM(S): at 11:51

## 2018-04-04 RX ADMIN — Medication 500 MILLIGRAM(S): at 21:24

## 2018-04-04 RX ADMIN — ATORVASTATIN CALCIUM 10 MILLIGRAM(S): 80 TABLET, FILM COATED ORAL at 21:24

## 2018-04-04 RX ADMIN — Medication 4 GRAM(S): at 11:52

## 2018-04-04 RX ADMIN — HEPARIN SODIUM 5000 UNIT(S): 5000 INJECTION INTRAVENOUS; SUBCUTANEOUS at 06:01

## 2018-04-04 NOTE — CONSULT NOTE ADULT - ASSESSMENT
Chills with subjective fever, sweats, leukocytosis at presentation with elevated LFT likely related to liver abscess.  No concern of UTI on the basis of history at present  Tender LLQ, CT was only of abdomen, not pelvis. Would exclude occult diverticulitis as potential source of liver abscess.  However no culture data before abx obtained.  No clear adjacent focus to explain liver abscess. Has stones but but no inflammatory changes around GB. Hematogenous source from elsewhere in DDx too. No murmur to implicate endocarditis. No over odontogenic focus. No travel/animal exposure to invoke Echinococcus or E. histolytica.

## 2018-04-04 NOTE — CONSULT NOTE ADULT - SUBJECTIVE AND OBJECTIVE BOX
[facility], Division of Infectious Diseases  FADI Mora A. Lee    GOLDMANN, KHURRAM  78y, Male  [id number]    HPI--  78M with hs spinal stenosis s/p surgery, HTN, DM2, resection of benign thyroid nodule, was brought to the hospital by his wife because he "didn't look good." Patient was in his USOH until about 10 days ago when he began experiencing chills, sweats at night, with subjective fever. He denies prior similar episodes. On 3/31 patient became disoriented while [place]. Patient's wife brought him to see his physician who treated the patient for possible UTI with ciprofloxacin. Patient denies any urinary symptoms out of the ordinary. Sunday he felt fairly well. Monday he again became confused, and was advised to come to the ER.     Here he had no fever, but was tachycardic in the ER. Patient was admitted with possible UTI, though no U/A noted in ED. note. Admit U/A unimpressive but potentially suppressed by prior antibiotics. He had an elevated WBC and was noted to have elevated LFT's. Sonography was ordered as a consequence which revealed liver lesion consistent with an abscess. CT scan of abdomen (personally reviewed) also consistent with multiloculated abscess, though no official report as of yet.     Patient's mental status now back to normal. Has no complaints other than that he does not like the food in the hospital.     Patient denies history of abdominal pain. Denies history of diverticulitis. Last colonoscopy 1 year ago, normal except for polyps. No diarrhea. No history of IBD. No hematochezia or blood streaked stool. Born in [place]. Denies travel outside of [place]. No unique occupational exposure. No pets. No farm/animal exposure. Denies any history of any recent dental work/problems. No recent procedures.       PMH/PSH--  Spinal stenosis  BPH (benign prostatic hypertrophy)  Overactive bladder  GERD (gastroesophageal reflux disease)  Hypertension  Thyroid nodule  Diabetes  H/O partial thyroidectomy      Allergies-- denies.       Medications--  Antibiotics: cefTRIAXone   IVPB 1 Gram(s) IV Intermittent every 24 hours    Immunologic:   Other: aspirin enteric coated  atorvastatin  dextrose 5%.  dextrose 50% Injectable  dextrose 50% Injectable  dextrose 50% Injectable  dextrose Gel PRN  glucagon  Injectable PRN  guaiFENesin   Syrup  (Sugar-Free) PRN  heparin  Injectable  insulin lispro (HumaLOG) corrective regimen sliding scale  levothyroxine  multivitamin  omega-3-Acid Ethyl Esters  pantoprazole    Tablet  sodium chloride 0.9%.  tamsulosin      Social History--  EtOH: denies   Tobacco: denies   Drug Use: denies     Family/Marital History--   with adult children,  Family history of heart disease  Family history of hypertension (Father)  No pertinent family history in first degree relatives    Remainder not relevant to clinical concern.    Travel/Environmental/Occupational History:  Retired. Worked for [employer], office-type work.    Review of Systems:  A >=10-point review of systems was obtained.     Pertinent positives and negatives--  Constitutional: +/- fevers. +Chills. No Rigors.   Eyes: denies.   ENMT: denies.   Cardiovascular: No chest pain. No palpitations.  Respiratory: No shortness of breath. No cough.  Gastrointestinal: No nausea or vomiting. No diarrhea or constipation.   Genitourinary: denies.   Musculoskeletal: denies.   Skin: denies.   Neurologic: denies.   Psychiatric: Pleasant. Appropriate affect.  Endocrine: as above  Heme/Lymphatic: denies.   Allergy/Immunologic: denies.     Review of systems otherwise negative except as previously noted.    Physical Exam--  Vital Signs: T(F): 97.6 (04-04-18 @ 04:12), Max: 99.4 (04-03-18 @ 20:40)  HR: 87 (04-04-18 @ 04:12)  BP: 126/81 (04-04-18 @ 04:12)  RR: 18 (04-04-18 @ 04:12)  SpO2: 97% (04-04-18 @ 04:12)  Wt(kg): --  General: Nontoxic-appearing Male in no acute distress.  HEENT: AT/NC. PERRL. EOMI. Anicteric. Conjunctiva pink and moist. Oropharynx clear.   Neck: Not rigid. No sense of mass.  Nodes: None palpable.  Lungs: Clear bilaterally without rales, wheezing or rhonchi  Heart: Regular rate and rhythm. No Murmur. No rub. No gallop. No palpable thrill.  Abdomen: Bowel sounds present and normoactive. Soft. Nondistended. Mild tenderness LLQ without guarding or rebound. No tenderness RUQ or elsewhere. No sense of mass. No organomegaly.  Back: No spinal tenderness. No costovertebral angle tenderness.   Extremities: No cyanosis or clubbing. No edema.   Skin: Warm. Dry. Good turgor. No rash. No vasculitic stigmata.  Psychiatric: Appropriate affect and mood for situation.       Laboratory & Imaging Data--  CBC                        11.7   10.52 )-----------( 215      ( 04 Apr 2018 07:59 )             35.9       Chemistries  04-04    133<L>  |  95<L>  |  18  ----------------------------<  114<H>  3.7   |  26  |  0.91    Ca    8.9      04 Apr 2018 05:59    TPro  6.9  /  Alb  3.1<L>  /  TBili  0.7  /  DBili  x   /  AST  33  /  ALT  43  /  AlkPhos  175<H>  04-03      Culture Data  Culture - Urine (collected 02 Apr 2018 16:42)  Source: .Urine Clean Catch (Midstream)  Final Report (03 Apr 2018 18:07):    No growth    Unfortunately no blood cx data obtained.    < from: US Abdomen Complete (04.03.18 @ 12:11) >  EXAM:  US ABDOMEN COMPLETE                        PROCEDURE DATE:  04/03/2018    INTERPRETATION:  CLINICAL INFORMATION: Elevated liver function tests,   leukocytosis.    COMPARISON: None available.    TECHNIQUE: Sonography of the abdomen.     FINDINGS:  Liver: Enlarged, measuring 17.8 cm. A complex hypoechoic multilobulated   lesion is noted in the right lobe, measuring 6.5 x 5.4 x 5.8 cm.    Bile ducts: Normal caliber. Common bile duct measures 3 mm.     Gallbladder: Cholelithiasis. Normal wall thickness. Negative sonographic   Zeng's sign.        Pancreas: Visualized portions are within normal limits.    Spleen: 10.4 cm. Within normal limits.    Right kidney: 14.1 cm. No hydronephrosis.     Left kidney: 13.3 cm.  No hydronephrosis. A 0.9 cm parapelvic cyst is   present. Trace perinephric fluid is noted.    Ascites: None.    Aorta and IVC: Visualized portions are within normal limits.    IMPRESSION:   A complex hypoechoic multilobulated lesion in the right lobe of theliver   is suspicious for an abscess. Abdominal CT may be helpful for further   evaluation.    Cholelithiasis.    These findings were discussed with nurse practitioner Khadijah on 4/3/2018   at 1:25 PM by Dr. Serrano with read back confirmation.    ALEX SERRANO M.D., ATTENDING RADIOLOGIST  This document has been electronically signed. Apr  3 2018  1:25PM  < end of copied text >

## 2018-04-04 NOTE — PROGRESS NOTE ADULT - PROBLEM SELECTOR PLAN 3
meets sepsis criteria with tachycardia and leukocytosis  rt hepatic abscess  will need IR consult for drainage

## 2018-04-04 NOTE — PROGRESS NOTE ADULT - SUBJECTIVE AND OBJECTIVE BOX
Very comfortable in bed    Vital Signs Last 24 Hrs  T(C): 36.4 (2018 04:12), Max: 37.4 (2018 20:40)  T(F): 97.6 (2018 04:12), Max: 99.4 (2018 20:40)  HR: 87 (2018 04:12) (87 - 88)  BP: 126/81 (2018 04:12) (120/74 - 128/84)  BP(mean): --  RR: 18 (2018 04:12) (17 - 18)  SpO2: 97% (2018 04:12) (97% - 97%)    GENERAL: NAD, Comfortable  HEAD:  Atraumatic, Normocephalic  EYES: EOMI, PERRLA, conjunctiva and sclera clear  NECK: Supple, No JVD  CHEST/LUNG: Clear to auscultation bilaterally; No wheeze  HEART: Regular rate and rhythm; No murmurs, rubs, or gallops  ABDOMEN: Soft, Nontender, Nondistended; Bowel sounds present  Neuro: AAO x 3, no focal deficit, 5/5 b/l extremities  EXTREMITIES:  2+ Peripheral Pulses, No clubbing, cyanosis, or edema  SKIN: No rashes or lesions    LABS:                        11.7   10.52 )-----------( 215      ( 2018 07:59 )             35.9     04    133<L>  |  95<L>  |  18  ----------------------------<  114<H>  3.7   |  26  |  0.91    Ca    8.9      2018 05:59    TPro  6.9  /  Alb  3.1<L>  /  TBili  0.7  /  DBili  x   /  AST  33  /  ALT  43  /  AlkPhos  175<H>  04-03    PT/INR - ( 2018 11:35 )   PT: 13.2 sec;   INR: 1.21 ratio         PTT - ( 2018 11:35 )  PTT:23.8 sec  CAPILLARY BLOOD GLUCOSE      POCT Blood Glucose.: 134 mg/dL (2018 08:20)  POCT Blood Glucose.: 139 mg/dL (2018 21:36)  POCT Blood Glucose.: 144 mg/dL (2018 16:56)  POCT Blood Glucose.: 113 mg/dL (2018 13:09)        Urinalysis Basic - ( 2018 12:33 )    Color: Yellow / Appearance: Clear / S.010 / pH: x  Gluc: x / Ketone: Negative  / Bili: Negative / Urobili: 1 mg/dL   Blood: x / Protein: Trace / Nitrite: Negative   Leuk Esterase: Negative / RBC: 3-5 /HPF / WBC 0-2 /HPF   Sq Epi: x / Non Sq Epi: x / Bacteria: x

## 2018-04-04 NOTE — CONSULT NOTE ADULT - ATTENDING COMMENTS
Further recommendations pending clinical course.    Thank you for the courtesy of this referral.    Elvis Cruz MD  [phone]

## 2018-04-04 NOTE — CONSULT NOTE ADULT - PROBLEM SELECTOR RECOMMENDATION 9
Await official CT report  Blood cx x2, though well after abx  If report c/w abscess favor percutaneous drainage  Will add Flagyl PO to Ceftriaxone IV for now. Will increase Ceftriaxone to 2g, viridans Streptococci from S. milleri group high in DDx.  ECHO given that blood cx data unreliable presently  Serial abdominal exams.  Would image CT pelvis with PO/IV contrast to assess for diverticulitis here  Low yield on Echinococcus/Ameba serology, will defer at this time.

## 2018-04-05 LAB
ANION GAP SERPL CALC-SCNC: 11 MMOL/L — SIGNIFICANT CHANGE UP (ref 5–17)
BUN SERPL-MCNC: 15 MG/DL — SIGNIFICANT CHANGE UP (ref 7–23)
CALCIUM SERPL-MCNC: 8.7 MG/DL — SIGNIFICANT CHANGE UP (ref 8.4–10.5)
CHLORIDE SERPL-SCNC: 95 MMOL/L — LOW (ref 96–108)
CO2 SERPL-SCNC: 27 MMOL/L — SIGNIFICANT CHANGE UP (ref 22–31)
CREAT SERPL-MCNC: 0.85 MG/DL — SIGNIFICANT CHANGE UP (ref 0.5–1.3)
GLUCOSE SERPL-MCNC: 123 MG/DL — HIGH (ref 70–99)
HCT VFR BLD CALC: 35.5 % — LOW (ref 39–50)
HGB BLD-MCNC: 12.1 G/DL — LOW (ref 13–17)
MCHC RBC-ENTMCNC: 28.5 PG — SIGNIFICANT CHANGE UP (ref 27–34)
MCHC RBC-ENTMCNC: 34.1 GM/DL — SIGNIFICANT CHANGE UP (ref 32–36)
MCV RBC AUTO: 83.7 FL — SIGNIFICANT CHANGE UP (ref 80–100)
NRBC # BLD: 0 /100 WBCS — SIGNIFICANT CHANGE UP (ref 0–0)
PLATELET # BLD AUTO: 257 K/UL — SIGNIFICANT CHANGE UP (ref 150–400)
POTASSIUM SERPL-MCNC: 3.9 MMOL/L — SIGNIFICANT CHANGE UP (ref 3.5–5.3)
POTASSIUM SERPL-SCNC: 3.9 MMOL/L — SIGNIFICANT CHANGE UP (ref 3.5–5.3)
RBC # BLD: 4.24 M/UL — SIGNIFICANT CHANGE UP (ref 4.2–5.8)
RBC # FLD: 13.9 % — SIGNIFICANT CHANGE UP (ref 10.3–14.5)
SODIUM SERPL-SCNC: 133 MMOL/L — LOW (ref 135–145)
WBC # BLD: 9.36 K/UL — SIGNIFICANT CHANGE UP (ref 3.8–10.5)
WBC # FLD AUTO: 9.36 K/UL — SIGNIFICANT CHANGE UP (ref 3.8–10.5)

## 2018-04-05 PROCEDURE — 72193 CT PELVIS W/DYE: CPT | Mod: 26

## 2018-04-05 RX ADMIN — PANTOPRAZOLE SODIUM 40 MILLIGRAM(S): 20 TABLET, DELAYED RELEASE ORAL at 06:20

## 2018-04-05 RX ADMIN — Medication 500 MILLIGRAM(S): at 06:20

## 2018-04-05 RX ADMIN — TAMSULOSIN HYDROCHLORIDE 0.4 MILLIGRAM(S): 0.4 CAPSULE ORAL at 21:15

## 2018-04-05 RX ADMIN — Medication 500 MILLIGRAM(S): at 21:15

## 2018-04-05 RX ADMIN — Medication 4 GRAM(S): at 12:52

## 2018-04-05 RX ADMIN — Medication 125 MICROGRAM(S): at 06:20

## 2018-04-05 RX ADMIN — HEPARIN SODIUM 5000 UNIT(S): 5000 INJECTION INTRAVENOUS; SUBCUTANEOUS at 17:16

## 2018-04-05 RX ADMIN — Medication 1 TABLET(S): at 12:52

## 2018-04-05 RX ADMIN — ATORVASTATIN CALCIUM 10 MILLIGRAM(S): 80 TABLET, FILM COATED ORAL at 21:15

## 2018-04-05 RX ADMIN — Medication 1: at 17:13

## 2018-04-05 RX ADMIN — HEPARIN SODIUM 5000 UNIT(S): 5000 INJECTION INTRAVENOUS; SUBCUTANEOUS at 06:20

## 2018-04-05 RX ADMIN — Medication 500 MILLIGRAM(S): at 14:06

## 2018-04-05 RX ADMIN — CEFTRIAXONE 100 GRAM(S): 500 INJECTION, POWDER, FOR SOLUTION INTRAMUSCULAR; INTRAVENOUS at 10:32

## 2018-04-05 NOTE — PROGRESS NOTE ADULT - SUBJECTIVE AND OBJECTIVE BOX
No new symptoms    Vital Signs Last 24 Hrs  T(C): 37.2 (05 Apr 2018 04:29), Max: 37.3 (04 Apr 2018 20:40)  T(F): 98.9 (05 Apr 2018 04:29), Max: 99.2 (04 Apr 2018 20:40)  HR: 86 (05 Apr 2018 10:15) (80 - 89)  BP: 151/78 (05 Apr 2018 10:15) (125/72 - 151/78)  BP(mean): --  RR: 18 (05 Apr 2018 04:29) (18 - 18)  SpO2: 98% (05 Apr 2018 10:15) (94% - 98%)    GENERAL: NAD, Comfortable  HEAD:  Atraumatic, Normocephalic  EYES: EOMI  NECK: Supple, No JVD  CHEST/LUNG: Clear to auscultation bilaterally; No wheeze  HEART: Regular rate and rhythm; No murmurs, rubs, or gallops  ABDOMEN: Soft, Nontender, Nondistended; Bowel sounds present  Neuro: AAO x 3, no focal deficit, 5/5 b/l extremities  EXTREMITIES:  2+ Peripheral Pulses, No LE edema  SKIN: No rashes or lesions    LABS:                        12.1   9.36  )-----------( 257      ( 05 Apr 2018 07:37 )             35.5     04-05    133<L>  |  95<L>  |  15  ----------------------------<  123<H>  3.9   |  27  |  0.85    Ca    8.7      05 Apr 2018 06:36        CAPILLARY BLOOD GLUCOSE      POCT Blood Glucose.: 129 mg/dL (05 Apr 2018 08:10)  POCT Blood Glucose.: 128 mg/dL (04 Apr 2018 21:44)  POCT Blood Glucose.: 113 mg/dL (04 Apr 2018 17:09)  POCT Blood Glucose.: 118 mg/dL (04 Apr 2018 11:59)

## 2018-04-05 NOTE — PROGRESS NOTE ADULT - SUBJECTIVE AND OBJECTIVE BOX
78M with h/o DMT2, HTN, GERD , Hypothyroidism admitted with mild AMS, urinary frequency and incontinence found to have liver abscess. IR requested to perform drainage of liver abscess.     Allergies: No Known Allergies      PAST MEDICAL & SURGICAL HISTORY:  Spinal stenosis  BPH (benign prostatic hypertrophy)  Overactive bladder  GERD (gastroesophageal reflux disease)  Hypertension  Thyroid nodule  Diabetes  H/O partial thyroidectomy        Pertinent labs:                      12.1   9.36  )-----------( 257      ( 05 Apr 2018 07:37 )             35.5   04-05    133<L>  |  95<L>  |  15  ----------------------------<  123<H>  3.9   |  27  |  0.85    Ca    8.7      05 Apr 2018 06:36      Plan: NPO past midnight. Hold SQ heparin. Image guided drainage of liver abscess.   Consent: Procedure/risks/ Benefits explained. Informed consent obtained. Pt and family verbalizes understanding.

## 2018-04-05 NOTE — PROGRESS NOTE ADULT - PROBLEM SELECTOR PLAN 3
meets sepsis criteria with tachycardia and leukocytosis  rt hepatic abscess  NPO after midnight for IR - liver abscess drainage tomorrow  Ct pelvis today with IV contrast

## 2018-04-05 NOTE — PROGRESS NOTE ADULT - SUBJECTIVE AND OBJECTIVE BOX
[facility], Division of Infectious Diseases  FADI Mora A. Lee  [phone]    Name: GOLDMANN, ROBERT  Age: 78y  Gender: Male  MRN: [id number]    Interval History--  Notes reviewed. Feels oka. No fevers, chills, or rigors. No CP. No SOB or cough. No abdominal pain. No N/V/D.  Drinking for repeat CT  IR aspiration tentatively tomorrow    Past Medical History--  Spinal stenosis  BPH (benign prostatic hypertrophy)  Overactive bladder  GERD (gastroesophageal reflux disease)  Hypertension  Thyroid nodule  Diabetes  H/O partial thyroidectomy      For details regarding the patient's social history, family history, and other miscellaneous elements, please refer the initial infectious diseases consultation and/or the admitting history and physical examination for this admission.    Allergies    No Known Allergies    Intolerances        Medications--  Antibiotics:  cefTRIAXone   IVPB      cefTRIAXone   IVPB 2 Gram(s) IV Intermittent every 24 hours  metroNIDAZOLE    Tablet 500 milliGRAM(s) Oral every 8 hours    Immunologic:    Other:  atorvastatin  dextrose 5%.  dextrose 50% Injectable  dextrose 50% Injectable  dextrose 50% Injectable  dextrose Gel PRN  glucagon  Injectable PRN  guaiFENesin   Syrup  (Sugar-Free) PRN  heparin  Injectable  insulin lispro (HumaLOG) corrective regimen sliding scale  levothyroxine  multivitamin  omega-3-Acid Ethyl Esters  pantoprazole    Tablet  sodium chloride 0.9%.  tamsulosin      Review of Systems--  A 10-point review of systems was obtained.     Pertinent positives and negatives--  Constitutional: No fevers. No Chills. No Rigors.   Cardiovascular: No chest pain. No palpitations.  Respiratory: No shortness of breath. No cough.  Gastrointestinal: No nausea or vomiting. No diarrhea or constipation.   Psychiatric: Pleasant. Appropriate affect.    Review of systems otherwise negative except as previously noted.    Physical Examination--  Vital Signs: T(F): 98.9 (04-05-18 @ 04:29), Max: 99.2 (04-04-18 @ 20:40)  HR: 86 (04-05-18 @ 10:15)  BP: 151/78 (04-05-18 @ 10:15)  RR: 18 (04-05-18 @ 04:29)  SpO2: 98% (04-05-18 @ 10:15)  Wt(kg): --  General: Nontoxic-appearing Male in no acute distress.  HEENT: AT/NC. PERRL. EOMI. Anicteric. Conjunctiva pink and moist. Oropharynx clear.   Neck: Not rigid. No sense of mass.  Nodes: None palpable.  Lungs: Clear bilaterally without rales, wheezing or rhonchi  Heart: Regular rate and rhythm. No Murmur. No rub. No gallop. No palpable thrill.  Abdomen: Bowel sounds present and normoactive. Soft. Nondistended. No tenderness LLQ today. No sense of mass. No organomegaly.  Back: No spinal tenderness. No costovertebral angle tenderness.   Extremities: No cyanosis or clubbing. No edema.   Skin: Warm. Dry. Good turgor. No rash. No vasculitic stigmata.  Psychiatric: Appropriate affect and mood for situation.       Laboratory Studies--  CBC                        12.1   9.36  )-----------( 257      ( 05 Apr 2018 07:37 )             35.5       Chemistries  04-05    133<L>  |  95<L>  |  15  ----------------------------<  123<H>  3.9   |  27  |  0.85    Ca    8.7      05 Apr 2018 06:36        Culture Data    Culture - Urine (collected 02 Apr 2018 16:42)  Source: .Urine Clean Catch (Midstream)  Final Report (03 Apr 2018 18:07):    No growth      No blood cx data as of yet.

## 2018-04-06 LAB
ANION GAP SERPL CALC-SCNC: 10 MMOL/L — SIGNIFICANT CHANGE UP (ref 5–17)
BUN SERPL-MCNC: 15 MG/DL — SIGNIFICANT CHANGE UP (ref 7–23)
CALCIUM SERPL-MCNC: 8.6 MG/DL — SIGNIFICANT CHANGE UP (ref 8.4–10.5)
CHLORIDE SERPL-SCNC: 95 MMOL/L — LOW (ref 96–108)
CO2 SERPL-SCNC: 27 MMOL/L — SIGNIFICANT CHANGE UP (ref 22–31)
CREAT SERPL-MCNC: 0.89 MG/DL — SIGNIFICANT CHANGE UP (ref 0.5–1.3)
GLUCOSE SERPL-MCNC: 125 MG/DL — HIGH (ref 70–99)
GRAM STN FLD: SIGNIFICANT CHANGE UP
HCT VFR BLD CALC: 36.2 % — LOW (ref 39–50)
HGB BLD-MCNC: 11.8 G/DL — LOW (ref 13–17)
MCHC RBC-ENTMCNC: 28 PG — SIGNIFICANT CHANGE UP (ref 27–34)
MCHC RBC-ENTMCNC: 32.6 GM/DL — SIGNIFICANT CHANGE UP (ref 32–36)
MCV RBC AUTO: 85.8 FL — SIGNIFICANT CHANGE UP (ref 80–100)
PLATELET # BLD AUTO: 297 K/UL — SIGNIFICANT CHANGE UP (ref 150–400)
POTASSIUM SERPL-MCNC: 4.1 MMOL/L — SIGNIFICANT CHANGE UP (ref 3.5–5.3)
POTASSIUM SERPL-SCNC: 4.1 MMOL/L — SIGNIFICANT CHANGE UP (ref 3.5–5.3)
RBC # BLD: 4.22 M/UL — SIGNIFICANT CHANGE UP (ref 4.2–5.8)
RBC # FLD: 14 % — SIGNIFICANT CHANGE UP (ref 10.3–14.5)
SODIUM SERPL-SCNC: 132 MMOL/L — LOW (ref 135–145)
SPECIMEN SOURCE: SIGNIFICANT CHANGE UP
WBC # BLD: 10.45 K/UL — SIGNIFICANT CHANGE UP (ref 3.8–10.5)
WBC # FLD AUTO: 10.45 K/UL — SIGNIFICANT CHANGE UP (ref 3.8–10.5)

## 2018-04-06 PROCEDURE — 49405 IMAGE CATH FLUID COLXN VISC: CPT

## 2018-04-06 RX ORDER — ACETAMINOPHEN 500 MG
650 TABLET ORAL ONCE
Qty: 0 | Refills: 0 | Status: COMPLETED | OUTPATIENT
Start: 2018-04-06 | End: 2018-04-06

## 2018-04-06 RX ADMIN — TAMSULOSIN HYDROCHLORIDE 0.4 MILLIGRAM(S): 0.4 CAPSULE ORAL at 21:35

## 2018-04-06 RX ADMIN — PANTOPRAZOLE SODIUM 40 MILLIGRAM(S): 20 TABLET, DELAYED RELEASE ORAL at 06:26

## 2018-04-06 RX ADMIN — Medication 650 MILLIGRAM(S): at 22:15

## 2018-04-06 RX ADMIN — Medication 500 MILLIGRAM(S): at 21:35

## 2018-04-06 RX ADMIN — Medication 500 MILLIGRAM(S): at 06:26

## 2018-04-06 RX ADMIN — Medication 4 GRAM(S): at 11:47

## 2018-04-06 RX ADMIN — Medication 500 MILLIGRAM(S): at 14:10

## 2018-04-06 RX ADMIN — ATORVASTATIN CALCIUM 10 MILLIGRAM(S): 80 TABLET, FILM COATED ORAL at 21:36

## 2018-04-06 RX ADMIN — Medication 125 MICROGRAM(S): at 06:26

## 2018-04-06 RX ADMIN — Medication 650 MILLIGRAM(S): at 21:35

## 2018-04-06 RX ADMIN — Medication 1 TABLET(S): at 11:47

## 2018-04-06 RX ADMIN — CEFTRIAXONE 100 GRAM(S): 500 INJECTION, POWDER, FOR SOLUTION INTRAMUSCULAR; INTRAVENOUS at 10:15

## 2018-04-06 RX ADMIN — HEPARIN SODIUM 5000 UNIT(S): 5000 INJECTION INTRAVENOUS; SUBCUTANEOUS at 21:36

## 2018-04-06 NOTE — PROGRESS NOTE ADULT - SUBJECTIVE AND OBJECTIVE BOX
Patient is a 78y old  Male who presents with a chief complaint of UTI, leukocytosis (03 Apr 2018 12:40)      SUBJECTIVE / OVERNIGHT EVENTS:        Vital Signs Last 24 Hrs  T(C): 36.8 (06 Apr 2018 11:41), Max: 37.7 (05 Apr 2018 20:58)  T(F): 98.2 (06 Apr 2018 11:41), Max: 99.8 (05 Apr 2018 20:58)  HR: 81 (06 Apr 2018 11:41) (81 - 85)  BP: 129/83 (06 Apr 2018 11:41) (129/83 - 145/77)  BP(mean): --  RR: 19 (06 Apr 2018 11:41) (18 - 19)  SpO2: 99% (06 Apr 2018 11:41) (96% - 100%)  I&O's Summary    05 Apr 2018 07:01  -  06 Apr 2018 07:00  --------------------------------------------------------  IN: 730 mL / OUT: 200 mL / NET: 530 mL    06 Apr 2018 07:01  -  06 Apr 2018 12:55  --------------------------------------------------------  IN: 150 mL / OUT: 0 mL / NET: 150 mL        GENERAL: NAD, Comfortable  HEAD:  Atraumatic, Normocephalic  EYES: EOMI  NECK: Supple, No JVD  CHEST/LUNG: Clear to auscultation bilaterally; No wheeze  HEART: Regular rate and rhythm; No murmurs, rubs, or gallops  ABDOMEN: Soft, Nontender, Nondistended; Bowel sounds present  Neuro: AAO x 3, no focal deficit, 5/5 b/l extremities  EXTREMITIES:  2+ Peripheral Pulses, No LE edema  SKIN: No rashes or lesions          LABS:                        11.8   10.45 )-----------( 297      ( 06 Apr 2018 07:33 )             36.2     04-06    132<L>  |  95<L>  |  15  ----------------------------<  125<H>  4.1   |  27  |  0.89    Ca    8.6      06 Apr 2018 06:28        CAPILLARY BLOOD GLUCOSE      POCT Blood Glucose.: 114 mg/dL (06 Apr 2018 11:58)  POCT Blood Glucose.: 113 mg/dL (06 Apr 2018 08:13)  POCT Blood Glucose.: 98 mg/dL (05 Apr 2018 21:37)  POCT Blood Glucose.: 175 mg/dL (05 Apr 2018 17:07)            RADIOLOGY & ADDITIONAL TESTS:    Imaging Personally Reviewed:  [x] YES  [ ] NO    Consultant(s) Notes Reviewed:  [x] YES  [ ] NO      MEDICATIONS  (STANDING):  atorvastatin 10 milliGRAM(s) Oral at bedtime  cefTRIAXone   IVPB      cefTRIAXone   IVPB 2 Gram(s) IV Intermittent every 24 hours  dextrose 5%. 1000 milliLiter(s) (50 mL/Hr) IV Continuous <Continuous>  dextrose 50% Injectable 12.5 Gram(s) IV Push once  dextrose 50% Injectable 25 Gram(s) IV Push once  dextrose 50% Injectable 25 Gram(s) IV Push once  heparin  Injectable 5000 Unit(s) SubCutaneous every 8 hours  insulin lispro (HumaLOG) corrective regimen sliding scale   SubCutaneous three times a day before meals  levothyroxine 125 MICROGram(s) Oral daily  metroNIDAZOLE    Tablet 500 milliGRAM(s) Oral every 8 hours  multivitamin 1 Tablet(s) Oral daily  omega-3-Acid Ethyl Esters 4 Gram(s) Oral daily  pantoprazole    Tablet 40 milliGRAM(s) Oral before breakfast  sodium chloride 0.9%. 1000 milliLiter(s) (50 mL/Hr) IV Continuous <Continuous>  tamsulosin 0.4 milliGRAM(s) Oral at bedtime    MEDICATIONS  (PRN):  dextrose Gel 1 Dose(s) Oral once PRN Blood Glucose LESS THAN 70 milliGRAM(s)/deciliter  glucagon  Injectable 1 milliGRAM(s) IntraMuscular once PRN Glucose LESS THAN 70 milligrams/deciliter  guaiFENesin   Syrup  (Sugar-Free) 100 milliGRAM(s) Oral every 6 hours PRN Cough      Care Discussed with Consultants/Other Providers [x] YES  [ ] NO    HEALTH ISSUES - PROBLEM Dx:  Liver abscess: Liver abscess  Prophylactic measure: Prophylactic measure  Benign prostatic hyperplasia, unspecified whether lower urinary tract symptoms present: Benign prostatic hyperplasia, unspecified whether lower urinary tract symptoms present  Essential hypertension: Essential hypertension  Hypothyroidism, unspecified type: Hypothyroidism, unspecified type  Type 2 diabetes mellitus with complication, without long-term current use of insulin: Type 2 diabetes mellitus with complication, without long-term current use of insulin  Elevated transaminase level: Elevated transaminase level  Sepsis, due to unspecified organism: Sepsis, due to unspecified organism  Hyponatremia: Hyponatremia  Urinary tract infection without hematuria, site unspecified: Urinary tract infection without hematuria, site unspecified Patient is a 78y old  Male who presents with a chief complaint of UTI, leukocytosis (03 Apr 2018 12:40)      SUBJECTIVE / OVERNIGHT EVENTS:    patient seen and examined at bedside  awaiting IR procedure today  no acute events overnight     Vital Signs Last 24 Hrs  T(C): 36.8 (06 Apr 2018 11:41), Max: 37.7 (05 Apr 2018 20:58)  T(F): 98.2 (06 Apr 2018 11:41), Max: 99.8 (05 Apr 2018 20:58)  HR: 81 (06 Apr 2018 11:41) (81 - 85)  BP: 129/83 (06 Apr 2018 11:41) (129/83 - 145/77)  BP(mean): --  RR: 19 (06 Apr 2018 11:41) (18 - 19)  SpO2: 99% (06 Apr 2018 11:41) (96% - 100%)  I&O's Summary    05 Apr 2018 07:01  -  06 Apr 2018 07:00  --------------------------------------------------------  IN: 730 mL / OUT: 200 mL / NET: 530 mL    06 Apr 2018 07:01  -  06 Apr 2018 12:55  --------------------------------------------------------  IN: 150 mL / OUT: 0 mL / NET: 150 mL        GENERAL: NAD, Comfortable  HEAD:  Atraumatic, Normocephalic  EYES: EOMI  NECK: Supple, No JVD  CHEST/LUNG: Clear to auscultation bilaterally; No wheeze  HEART: Regular rate and rhythm; No murmurs, rubs, or gallops  ABDOMEN: Soft, Nontender, Nondistended; Bowel sounds present  Neuro: AAO x 3, no focal deficit, 5/5 b/l extremities  EXTREMITIES:  2+ Peripheral Pulses, No LE edema  SKIN: No rashes or lesions          LABS:                        11.8   10.45 )-----------( 297      ( 06 Apr 2018 07:33 )             36.2     04-06    132<L>  |  95<L>  |  15  ----------------------------<  125<H>  4.1   |  27  |  0.89    Ca    8.6      06 Apr 2018 06:28        CAPILLARY BLOOD GLUCOSE      POCT Blood Glucose.: 114 mg/dL (06 Apr 2018 11:58)  POCT Blood Glucose.: 113 mg/dL (06 Apr 2018 08:13)  POCT Blood Glucose.: 98 mg/dL (05 Apr 2018 21:37)  POCT Blood Glucose.: 175 mg/dL (05 Apr 2018 17:07)            RADIOLOGY & ADDITIONAL TESTS:    Imaging Personally Reviewed:  [x] YES  [ ] NO    Consultant(s) Notes Reviewed:  [x] YES  [ ] NO      MEDICATIONS  (STANDING):  atorvastatin 10 milliGRAM(s) Oral at bedtime  cefTRIAXone   IVPB      cefTRIAXone   IVPB 2 Gram(s) IV Intermittent every 24 hours  dextrose 5%. 1000 milliLiter(s) (50 mL/Hr) IV Continuous <Continuous>  dextrose 50% Injectable 12.5 Gram(s) IV Push once  dextrose 50% Injectable 25 Gram(s) IV Push once  dextrose 50% Injectable 25 Gram(s) IV Push once  heparin  Injectable 5000 Unit(s) SubCutaneous every 8 hours  insulin lispro (HumaLOG) corrective regimen sliding scale   SubCutaneous three times a day before meals  levothyroxine 125 MICROGram(s) Oral daily  metroNIDAZOLE    Tablet 500 milliGRAM(s) Oral every 8 hours  multivitamin 1 Tablet(s) Oral daily  omega-3-Acid Ethyl Esters 4 Gram(s) Oral daily  pantoprazole    Tablet 40 milliGRAM(s) Oral before breakfast  sodium chloride 0.9%. 1000 milliLiter(s) (50 mL/Hr) IV Continuous <Continuous>  tamsulosin 0.4 milliGRAM(s) Oral at bedtime    MEDICATIONS  (PRN):  dextrose Gel 1 Dose(s) Oral once PRN Blood Glucose LESS THAN 70 milliGRAM(s)/deciliter  glucagon  Injectable 1 milliGRAM(s) IntraMuscular once PRN Glucose LESS THAN 70 milligrams/deciliter  guaiFENesin   Syrup  (Sugar-Free) 100 milliGRAM(s) Oral every 6 hours PRN Cough      Care Discussed with Consultants/Other Providers [x] YES  [ ] NO    HEALTH ISSUES - PROBLEM Dx:  Liver abscess: Liver abscess  Prophylactic measure: Prophylactic measure  Benign prostatic hyperplasia, unspecified whether lower urinary tract symptoms present: Benign prostatic hyperplasia, unspecified whether lower urinary tract symptoms present  Essential hypertension: Essential hypertension  Hypothyroidism, unspecified type: Hypothyroidism, unspecified type  Type 2 diabetes mellitus with complication, without long-term current use of insulin: Type 2 diabetes mellitus with complication, without long-term current use of insulin  Elevated transaminase level: Elevated transaminase level  Sepsis, due to unspecified organism: Sepsis, due to unspecified organism  Hyponatremia: Hyponatremia  Urinary tract infection without hematuria, site unspecified: Urinary tract infection without hematuria, site unspecified

## 2018-04-06 NOTE — PROGRESS NOTE ADULT - SUBJECTIVE AND OBJECTIVE BOX
Interventional Radiology Pre-Procedure Note    Procedure: Liver abscess drainage    Diagnosis/Indication: Patient is a 78 year old gentleman PMH DM2 initially presenting with lethargy and confusion, found to have liver abscess.    PAST MEDICAL & SURGICAL HISTORY:  Spinal stenosis  BPH (benign prostatic hypertrophy)  Overactive bladder  GERD (gastroesophageal reflux disease)  Hypertension  Thyroid nodule  Diabetes  H/O partial thyroidectomy     Allergies: No Known Allergies    LABS:  CBC Full  -  ( 06 Apr 2018 07:33 )  WBC Count : 10.45 K/uL  Hemoglobin : 11.8 g/dL  Hematocrit : 36.2 %  Platelet Count - Automated : 297 K/uL  Mean Cell Volume : 85.8 fl  Mean Cell Hemoglobin : 28.0 pg  Mean Cell Hemoglobin Concentration : 32.6 gm/dL    04-06    132<L>  |  95<L>  |  15  ----------------------------<  125<H>  4.1   |  27  |  0.89    Ca    8.6      06 Apr 2018 06:28    INR 1.21     Procedure/ risks/ benefits/ alternatives were explained, informed consent obtained from patient, verbalizes understanding.

## 2018-04-06 NOTE — PROGRESS NOTE ADULT - SUBJECTIVE AND OBJECTIVE BOX
Interventional Radiology Brief- Operative Note    Procedure: CT/US guided liver abscess drainage    Operators: Mami Vazquez    Anesthesia (type): Sedation administered by an anesth attg. 2% lidocaine local    Contrast: none    EBL: none    Findings/Follow up Plan of Care: CT guided drainage of liver abscess performed. 10 Fr drain placed. Appx 67cc brown cloudy fluid aspirated. Drain placed to DENNIS. Pt tolerated procedure without difficulty. Full report to follow.    Specimens Removed: sample collected for microbiology    Implants: drain    Complications: none    Condition/Disposition: stable/pacu    Please call Interventional Radiology x [phone] with any questions, concerns, or issues.

## 2018-04-06 NOTE — PROGRESS NOTE ADULT - PROBLEM SELECTOR PLAN 3
meets sepsis criteria with tachycardia and leukocytosis  rt hepatic abscess  NPO after midnight for IR - liver abscess drainage tomorrow  Ct pelvis today with IV contrast meets sepsis criteria with tachycardia and leukocytosis  rt hepatic abscess  plan for liver abscess drainage today  Ct pelvis today with IV contrast

## 2018-04-07 RX ADMIN — Medication 125 MICROGRAM(S): at 06:33

## 2018-04-07 RX ADMIN — Medication 1 TABLET(S): at 13:56

## 2018-04-07 RX ADMIN — ATORVASTATIN CALCIUM 10 MILLIGRAM(S): 80 TABLET, FILM COATED ORAL at 21:29

## 2018-04-07 RX ADMIN — CEFTRIAXONE 100 GRAM(S): 500 INJECTION, POWDER, FOR SOLUTION INTRAMUSCULAR; INTRAVENOUS at 10:36

## 2018-04-07 RX ADMIN — Medication 500 MILLIGRAM(S): at 06:33

## 2018-04-07 RX ADMIN — HEPARIN SODIUM 5000 UNIT(S): 5000 INJECTION INTRAVENOUS; SUBCUTANEOUS at 21:29

## 2018-04-07 RX ADMIN — Medication 500 MILLIGRAM(S): at 21:29

## 2018-04-07 RX ADMIN — HEPARIN SODIUM 5000 UNIT(S): 5000 INJECTION INTRAVENOUS; SUBCUTANEOUS at 13:57

## 2018-04-07 RX ADMIN — Medication 4 GRAM(S): at 13:57

## 2018-04-07 RX ADMIN — PANTOPRAZOLE SODIUM 40 MILLIGRAM(S): 20 TABLET, DELAYED RELEASE ORAL at 06:33

## 2018-04-07 RX ADMIN — Medication 500 MILLIGRAM(S): at 13:58

## 2018-04-07 RX ADMIN — HEPARIN SODIUM 5000 UNIT(S): 5000 INJECTION INTRAVENOUS; SUBCUTANEOUS at 06:33

## 2018-04-07 RX ADMIN — TAMSULOSIN HYDROCHLORIDE 0.4 MILLIGRAM(S): 0.4 CAPSULE ORAL at 21:29

## 2018-04-07 NOTE — PROGRESS NOTE ADULT - PROBLEM SELECTOR PLAN 3
meets sepsis criteria with tachycardia and leukocytosis  rt hepatic abscess  s/p drainage-  Fluid cx ngtd to f/u  ID f/u noted  cont abx

## 2018-04-07 NOTE — PROGRESS NOTE ADULT - SUBJECTIVE AND OBJECTIVE BOX
[facility], Division of Infectious Diseases  [facility]  [phone]    Name: GOLDMANN, ROBERT  Age: 78y  Gender: Male  MRN: [id number]    Interval History--  Notes reviewed. Seen earlier this AM.   Feels ok.  Some soreness at site of percutaneous drainage. No fevers, chills, or rigors. No diarrhea. Otherwise feels ok.  CT pelvis w diverticulosis.    Past Medical History--  Spinal stenosis  BPH (benign prostatic hypertrophy)  Overactive bladder  GERD (gastroesophageal reflux disease)  Hypertension  Thyroid nodule  Diabetes  H/O partial thyroidectomy      For details regarding the patient's social history, family history, and other miscellaneous elements, please refer the initial infectious diseases consultation and/or the admitting history and physical examination for this admission.    Allergies    No Known Allergies    Intolerances        Medications--  Antibiotics:  cefTRIAXone   IVPB      cefTRIAXone   IVPB 2 Gram(s) IV Intermittent every 24 hours  metroNIDAZOLE    Tablet 500 milliGRAM(s) Oral every 8 hours    Immunologic:    Other:  atorvastatin  dextrose 5%.  dextrose 50% Injectable  dextrose 50% Injectable  dextrose 50% Injectable  dextrose Gel PRN  glucagon  Injectable PRN  guaiFENesin   Syrup  (Sugar-Free) PRN  heparin  Injectable  insulin lispro (HumaLOG) corrective regimen sliding scale  levothyroxine  multivitamin  omega-3-Acid Ethyl Esters  pantoprazole    Tablet  sodium chloride 0.9%.  tamsulosin      Review of Systems--  A 10-point review of systems was obtained.   Review of systems otherwise unchanged compared to prior visit except as previously noted.    Physical Examination--  Vital Signs: T(F): 99.5 (04-07-18 @ 04:12), Max: 100.1 (04-06-18 @ 22:23)  HR: 83 (04-07-18 @ 04:12)  BP: 132/82 (04-07-18 @ 04:12)  RR: 18 (04-07-18 @ 04:12)  SpO2: 96% (04-07-18 @ 04:12)  Wt(kg): --  General: Nontoxic-appearing Male in no acute distress.  HEENT: AT/NC. PERRL. EOMI. Anicteric. Conjunctiva pink and moist. Oropharynx clear.   Neck: Not rigid. No sense of mass.  Nodes: None palpable.  Lungs: Clear bilaterally without rales, wheezing or rhonchi  Heart: Regular rate and rhythm. No Murmur. No rub. No gallop. No palpable thrill.  Abdomen: Bowel sounds present and normoactive. Soft. Nondistended. NT. No sense of mass. No organomegaly. Drain-> scant SS fluid presently  Extremities: No cyanosis or clubbing. No edema.   Skin: Warm. Dry. Good turgor. No rash. No vasculitic stigmata.  Psychiatric: Appropriate affect and mood for situation.     Laboratory Studies--  CBC                        11.8   10.45 )-----------( 297      ( 06 Apr 2018 07:33 )             36.2       Chemistries  04-06    132<L>  |  95<L>  |  15  ----------------------------<  125<H>  4.1   |  27  |  0.89    Ca    8.6      06 Apr 2018 06:28        Culture Data    Culture - Body Fluid with Gram Stain (collected 06 Apr 2018 21:41)  Source: .Body Fluid Abdominal Fluid  Gram Stain (06 Apr 2018 23:51):    polymorphonuclear leukocytes seen    No organisms seen per oil power field    Culture - Blood (collected 04 Apr 2018 15:34)  Source: .Blood Blood-Peripheral  Preliminary Report (05 Apr 2018 16:01):    No growth to date.    Culture - Blood (collected 04 Apr 2018 15:34)  Source: .Blood Blood-Venous  Preliminary Report (05 Apr 2018 16:01):    No growth to date.    Culture - Urine (collected 02 Apr 2018 16:42)  Source: .Urine Clean Catch (Midstream)  Final Report (03 Apr 2018 18:07):    No growth

## 2018-04-07 NOTE — PROGRESS NOTE ADULT - SUBJECTIVE AND OBJECTIVE BOX
Patient is a 78y old  Male who presents with a chief complaint of UTI, leukocytosis (03 Apr 2018 12:40)  pt seen and examined at bedside   SUBJECTIVE/OVERNIGHT events noted, s/p IR hepatic drain, c/o mild discomfort at the site  Vital Signs Last 24 Hrs  T(C): 36.6 (07 Apr 2018 11:59), Max: 37.8 (06 Apr 2018 19:45)  T(F): 97.8 (07 Apr 2018 11:59), Max: 100.1 (06 Apr 2018 22:23)  HR: 79 (07 Apr 2018 11:59) (79 - 95)  BP: 125/81 (07 Apr 2018 11:59) (125/81 - 157/82)  BP(mean): --  RR: 18 (07 Apr 2018 11:59) (18 - 18)  SpO2: 96% (07 Apr 2018 11:59) (96% - 99%)  CAPILLARY BLOOD GLUCOSE      POCT Blood Glucose.: 117 mg/dL (07 Apr 2018 17:01)  POCT Blood Glucose.: 148 mg/dL (07 Apr 2018 12:12)  POCT Blood Glucose.: 114 mg/dL (07 Apr 2018 08:08)  POCT Blood Glucose.: 215 mg/dL (06 Apr 2018 21:52)    MEDICATIONS  (STANDING):  atorvastatin 10 milliGRAM(s) Oral at bedtime  cefTRIAXone   IVPB      cefTRIAXone   IVPB 2 Gram(s) IV Intermittent every 24 hours  dextrose 5%. 1000 milliLiter(s) (50 mL/Hr) IV Continuous <Continuous>  dextrose 50% Injectable 12.5 Gram(s) IV Push once  dextrose 50% Injectable 25 Gram(s) IV Push once  dextrose 50% Injectable 25 Gram(s) IV Push once  heparin  Injectable 5000 Unit(s) SubCutaneous every 8 hours  insulin lispro (HumaLOG) corrective regimen sliding scale   SubCutaneous three times a day before meals  levothyroxine 125 MICROGram(s) Oral daily  metroNIDAZOLE    Tablet 500 milliGRAM(s) Oral every 8 hours  multivitamin 1 Tablet(s) Oral daily  omega-3-Acid Ethyl Esters 4 Gram(s) Oral daily  pantoprazole    Tablet 40 milliGRAM(s) Oral before breakfast  sodium chloride 0.9%. 1000 milliLiter(s) (50 mL/Hr) IV Continuous <Continuous>  tamsulosin 0.4 milliGRAM(s) Oral at bedtime    MEDICATIONS  (PRN):  dextrose Gel 1 Dose(s) Oral once PRN Blood Glucose LESS THAN 70 milliGRAM(s)/deciliter  glucagon  Injectable 1 milliGRAM(s) IntraMuscular once PRN Glucose LESS THAN 70 milligrams/deciliter  guaiFENesin   Syrup  (Sugar-Free) 100 milliGRAM(s) Oral every 6 hours PRN Cough    PHYSICAL EXAM  General : comfortable, not in any acute distress  Neck : supple, no LAD, no JVD  Eyes : EOMI, PERRLA, conjunctiva : no icterus, no pallor  MM moist, no pharyngeal erythema/exudates  Pulmonary: clear to auscultation bilateral lung fields, no crackles/rhonchi/wheezing,   CVS : S1 S2,rate normal-,rhythm-regular, no murmurs, no abnormal sounds  Gastrointestinal: soft, non tender, non distended, no organomegaly , bowel sounds audible  Extremities: no edema  Neuro: AAOx3, no focal deficits  Musculoskeletal:  FROM of all ext  Skin: no rash  LABS                        11.8   10.45 )-----------( 297      ( 06 Apr 2018 07:33 )             36.2     04-06    132<L>  |  95<L>  |  15  ----------------------------<  125<H>  4.1   |  27  |  0.89    Ca    8.6      06 Apr 2018 06:28        Culture - Body Fluid with Gram Stain (collected 06 Apr 2018 21:41)  Source: .Body Fluid Abdominal Fluid  Gram Stain (06 Apr 2018 23:51):    polymorphonuclear leukocytes seen    No organisms seen per oil power field  Preliminary Report (07 Apr 2018 18:02):    No growth      RADIOLOGY and IMAGING reviewed: yes  Consultant notes reviewed : yes  Care discussed with Consultants/other providers : Patient is a 78y old  Male who presents with a chief complaint of UTI, leukocytosis (03 Apr 2018 12:40)  pt seen and examined at bedside   SUBJECTIVE/OVERNIGHT events noted, s/p IR hepatic drain, c/o mild discomfort at the site  Vital Signs Last 24 Hrs  T(C): 36.6 (07 Apr 2018 11:59), Max: 37.8 (06 Apr 2018 19:45)  T(F): 97.8 (07 Apr 2018 11:59), Max: 100.1 (06 Apr 2018 22:23)  HR: 79 (07 Apr 2018 11:59) (79 - 95)  BP: 125/81 (07 Apr 2018 11:59) (125/81 - 157/82)  BP(mean): --  RR: 18 (07 Apr 2018 11:59) (18 - 18)  SpO2: 96% (07 Apr 2018 11:59) (96% - 99%)  CAPILLARY BLOOD GLUCOSE      POCT Blood Glucose.: 117 mg/dL (07 Apr 2018 17:01)  POCT Blood Glucose.: 148 mg/dL (07 Apr 2018 12:12)  POCT Blood Glucose.: 114 mg/dL (07 Apr 2018 08:08)  POCT Blood Glucose.: 215 mg/dL (06 Apr 2018 21:52)    MEDICATIONS  (STANDING):  atorvastatin 10 milliGRAM(s) Oral at bedtime  cefTRIAXone   IVPB      cefTRIAXone   IVPB 2 Gram(s) IV Intermittent every 24 hours  dextrose 5%. 1000 milliLiter(s) (50 mL/Hr) IV Continuous <Continuous>  dextrose 50% Injectable 12.5 Gram(s) IV Push once  dextrose 50% Injectable 25 Gram(s) IV Push once  dextrose 50% Injectable 25 Gram(s) IV Push once  heparin  Injectable 5000 Unit(s) SubCutaneous every 8 hours  insulin lispro (HumaLOG) corrective regimen sliding scale   SubCutaneous three times a day before meals  levothyroxine 125 MICROGram(s) Oral daily  metroNIDAZOLE    Tablet 500 milliGRAM(s) Oral every 8 hours  multivitamin 1 Tablet(s) Oral daily  omega-3-Acid Ethyl Esters 4 Gram(s) Oral daily  pantoprazole    Tablet 40 milliGRAM(s) Oral before breakfast  sodium chloride 0.9%. 1000 milliLiter(s) (50 mL/Hr) IV Continuous <Continuous>  tamsulosin 0.4 milliGRAM(s) Oral at bedtime    MEDICATIONS  (PRN):  dextrose Gel 1 Dose(s) Oral once PRN Blood Glucose LESS THAN 70 milliGRAM(s)/deciliter  glucagon  Injectable 1 milliGRAM(s) IntraMuscular once PRN Glucose LESS THAN 70 milligrams/deciliter  guaiFENesin   Syrup  (Sugar-Free) 100 milliGRAM(s) Oral every 6 hours PRN Cough    PHYSICAL EXAM  General : comfortable, not in any acute distress  Neck : supple, no LAD, no JVD  Eyes :  no icterus, no pallor  MM moist, no pharyngeal erythema/exudates  Pulmonary: clear to auscultation bilateral lung fields, no crackles/rhonchi/wheezing,   CVS : S1 S2,rate normal-,rhythm-regular, no murmurs, no abnormal sounds  Gastrointestinal: soft, non tender, non distended, no organomegaly ruq abd drain- serosanguinous fluid  Extremities: no edema  Neuro: AAOx3, no focal deficits  Musculoskeletal:  FROM of all ext  Skin: no rash  LABS                        11.8   10.45 )-----------( 297      ( 06 Apr 2018 07:33 )             36.2     04-06    132<L>  |  95<L>  |  15  ----------------------------<  125<H>  4.1   |  27  |  0.89    Ca    8.6      06 Apr 2018 06:28        Culture - Body Fluid with Gram Stain (collected 06 Apr 2018 21:41)  Source: .Body Fluid Abdominal Fluid  Gram Stain (06 Apr 2018 23:51):    polymorphonuclear leukocytes seen    No organisms seen per oil power field  Preliminary Report (07 Apr 2018 18:02):    No growth      RADIOLOGY and IMAGING reviewed: yes  Consultant notes reviewed : yes  Care discussed with Consultants/other providers :

## 2018-04-08 RX ORDER — DOCUSATE SODIUM 100 MG
100 CAPSULE ORAL THREE TIMES A DAY
Qty: 0 | Refills: 0 | Status: DISCONTINUED | OUTPATIENT
Start: 2018-04-08 | End: 2018-04-11

## 2018-04-08 RX ADMIN — Medication 500 MILLIGRAM(S): at 06:30

## 2018-04-08 RX ADMIN — Medication 500 MILLIGRAM(S): at 21:55

## 2018-04-08 RX ADMIN — Medication 100 MILLIGRAM(S): at 08:19

## 2018-04-08 RX ADMIN — HEPARIN SODIUM 5000 UNIT(S): 5000 INJECTION INTRAVENOUS; SUBCUTANEOUS at 14:41

## 2018-04-08 RX ADMIN — Medication 1 TABLET(S): at 12:56

## 2018-04-08 RX ADMIN — HEPARIN SODIUM 5000 UNIT(S): 5000 INJECTION INTRAVENOUS; SUBCUTANEOUS at 06:30

## 2018-04-08 RX ADMIN — TAMSULOSIN HYDROCHLORIDE 0.4 MILLIGRAM(S): 0.4 CAPSULE ORAL at 21:56

## 2018-04-08 RX ADMIN — Medication 4 GRAM(S): at 12:56

## 2018-04-08 RX ADMIN — HEPARIN SODIUM 5000 UNIT(S): 5000 INJECTION INTRAVENOUS; SUBCUTANEOUS at 21:56

## 2018-04-08 RX ADMIN — Medication 500 MILLIGRAM(S): at 16:41

## 2018-04-08 RX ADMIN — PANTOPRAZOLE SODIUM 40 MILLIGRAM(S): 20 TABLET, DELAYED RELEASE ORAL at 06:31

## 2018-04-08 RX ADMIN — Medication 100 MILLIGRAM(S): at 21:55

## 2018-04-08 RX ADMIN — Medication 125 MICROGRAM(S): at 06:30

## 2018-04-08 RX ADMIN — ATORVASTATIN CALCIUM 10 MILLIGRAM(S): 80 TABLET, FILM COATED ORAL at 21:55

## 2018-04-08 RX ADMIN — Medication 100 MILLIGRAM(S): at 16:43

## 2018-04-08 RX ADMIN — CEFTRIAXONE 100 GRAM(S): 500 INJECTION, POWDER, FOR SOLUTION INTRAMUSCULAR; INTRAVENOUS at 10:55

## 2018-04-08 NOTE — PROGRESS NOTE ADULT - SUBJECTIVE AND OBJECTIVE BOX
Patient is a 78y old  Male who presents with a chief complaint of UTI, leukocytosis (03 Apr 2018 12:40)  pt seen and examined at bedside   SUBJECTIVE/OVERNIGHT events none, ruq drain in place  Vital Signs Last 24 Hrs  T(C): 36.7 (08 Apr 2018 12:14), Max: 37 (08 Apr 2018 03:55)  T(F): 98 (08 Apr 2018 12:14), Max: 98.6 (08 Apr 2018 03:55)  HR: 74 (08 Apr 2018 12:14) (74 - 85)  BP: 114/74 (08 Apr 2018 12:14) (114/74 - 149/83)  BP(mean): --  RR: 18 (08 Apr 2018 12:14) (17 - 18)  SpO2: 97% (08 Apr 2018 12:14) (97% - 98%)  CAPILLARY BLOOD GLUCOSE      POCT Blood Glucose.: 136 mg/dL (08 Apr 2018 12:38)  POCT Blood Glucose.: 114 mg/dL (08 Apr 2018 08:21)  POCT Blood Glucose.: 125 mg/dL (07 Apr 2018 21:35)  POCT Blood Glucose.: 117 mg/dL (07 Apr 2018 17:01)    MEDICATIONS  (STANDING):  atorvastatin 10 milliGRAM(s) Oral at bedtime  cefTRIAXone   IVPB      cefTRIAXone   IVPB 2 Gram(s) IV Intermittent every 24 hours  dextrose 5%. 1000 milliLiter(s) (50 mL/Hr) IV Continuous <Continuous>  dextrose 50% Injectable 12.5 Gram(s) IV Push once  dextrose 50% Injectable 25 Gram(s) IV Push once  dextrose 50% Injectable 25 Gram(s) IV Push once  docusate sodium 100 milliGRAM(s) Oral three times a day  heparin  Injectable 5000 Unit(s) SubCutaneous every 8 hours  insulin lispro (HumaLOG) corrective regimen sliding scale   SubCutaneous three times a day before meals  levothyroxine 125 MICROGram(s) Oral daily  metroNIDAZOLE    Tablet 500 milliGRAM(s) Oral every 8 hours  multivitamin 1 Tablet(s) Oral daily  omega-3-Acid Ethyl Esters 4 Gram(s) Oral daily  pantoprazole    Tablet 40 milliGRAM(s) Oral before breakfast  sodium chloride 0.9%. 1000 milliLiter(s) (50 mL/Hr) IV Continuous <Continuous>  tamsulosin 0.4 milliGRAM(s) Oral at bedtime    MEDICATIONS  (PRN):  dextrose Gel 1 Dose(s) Oral once PRN Blood Glucose LESS THAN 70 milliGRAM(s)/deciliter  glucagon  Injectable 1 milliGRAM(s) IntraMuscular once PRN Glucose LESS THAN 70 milligrams/deciliter  guaiFENesin   Syrup  (Sugar-Free) 100 milliGRAM(s) Oral every 6 hours PRN Cough    PHYSICAL EXAM  General : comfortable, not in any acute distress  Neck : supple, no LAD, no JVD  Eyes : EOMI, PERRLA, conjunctiva : no icterus, no pallor  MM moist, no pharyngeal erythema/exudates  Pulmonary: clear to auscultation bilateral lung fields, no crackles/rhonchi/wheezing,   CVS : S1 S2,rate normal-,rhythm-regular, no murmurs, no abnormal sounds  Gastrointestinal: soft, non tender, non distended, no organomegaly , ruq drain  Extremities: no edema  Neuro: AAOx3, no focal deficits  Musculoskeletal:  FROM of all ext  Skin: no rash  LABS            Culture - Body Fluid with Gram Stain (collected 06 Apr 2018 21:41)  Source: .Body Fluid Abdominal Fluid  Gram Stain (06 Apr 2018 23:51):    polymorphonuclear leukocytes seen    No organisms seen per oil power field  Preliminary Report (07 Apr 2018 18:02):    No growth      RADIOLOGY and IMAGING reviewed: yes  Consultant notes reviewed : yes  Care discussed with Consultants/other providers :

## 2018-04-09 LAB
ANION GAP SERPL CALC-SCNC: 10 MMOL/L — SIGNIFICANT CHANGE UP (ref 5–17)
BUN SERPL-MCNC: 14 MG/DL — SIGNIFICANT CHANGE UP (ref 7–23)
CALCIUM SERPL-MCNC: 8.7 MG/DL — SIGNIFICANT CHANGE UP (ref 8.4–10.5)
CHLORIDE SERPL-SCNC: 98 MMOL/L — SIGNIFICANT CHANGE UP (ref 96–108)
CO2 SERPL-SCNC: 26 MMOL/L — SIGNIFICANT CHANGE UP (ref 22–31)
CREAT SERPL-MCNC: 0.84 MG/DL — SIGNIFICANT CHANGE UP (ref 0.5–1.3)
CULTURE RESULTS: SIGNIFICANT CHANGE UP
CULTURE RESULTS: SIGNIFICANT CHANGE UP
GLUCOSE SERPL-MCNC: 116 MG/DL — HIGH (ref 70–99)
HCT VFR BLD CALC: 36.8 % — LOW (ref 39–50)
HGB BLD-MCNC: 12.3 G/DL — LOW (ref 13–17)
MCHC RBC-ENTMCNC: 28.3 PG — SIGNIFICANT CHANGE UP (ref 27–34)
MCHC RBC-ENTMCNC: 33.4 GM/DL — SIGNIFICANT CHANGE UP (ref 32–36)
MCV RBC AUTO: 84.8 FL — SIGNIFICANT CHANGE UP (ref 80–100)
PLATELET # BLD AUTO: 459 K/UL — HIGH (ref 150–400)
POTASSIUM SERPL-MCNC: 4.2 MMOL/L — SIGNIFICANT CHANGE UP (ref 3.5–5.3)
POTASSIUM SERPL-SCNC: 4.2 MMOL/L — SIGNIFICANT CHANGE UP (ref 3.5–5.3)
RBC # BLD: 4.34 M/UL — SIGNIFICANT CHANGE UP (ref 4.2–5.8)
RBC # FLD: 14.1 % — SIGNIFICANT CHANGE UP (ref 10.3–14.5)
SODIUM SERPL-SCNC: 134 MMOL/L — LOW (ref 135–145)
SPECIMEN SOURCE: SIGNIFICANT CHANGE UP
SPECIMEN SOURCE: SIGNIFICANT CHANGE UP
WBC # BLD: 6.78 K/UL — SIGNIFICANT CHANGE UP (ref 3.8–10.5)
WBC # FLD AUTO: 6.78 K/UL — SIGNIFICANT CHANGE UP (ref 3.8–10.5)

## 2018-04-09 RX ADMIN — HEPARIN SODIUM 5000 UNIT(S): 5000 INJECTION INTRAVENOUS; SUBCUTANEOUS at 22:06

## 2018-04-09 RX ADMIN — Medication 500 MILLIGRAM(S): at 06:39

## 2018-04-09 RX ADMIN — Medication 500 MILLIGRAM(S): at 14:57

## 2018-04-09 RX ADMIN — HEPARIN SODIUM 5000 UNIT(S): 5000 INJECTION INTRAVENOUS; SUBCUTANEOUS at 06:39

## 2018-04-09 RX ADMIN — Medication 100 MILLIGRAM(S): at 14:57

## 2018-04-09 RX ADMIN — Medication 1 TABLET(S): at 11:37

## 2018-04-09 RX ADMIN — Medication 125 MICROGRAM(S): at 06:39

## 2018-04-09 RX ADMIN — Medication 500 MILLIGRAM(S): at 22:06

## 2018-04-09 RX ADMIN — Medication 4 GRAM(S): at 11:37

## 2018-04-09 RX ADMIN — CEFTRIAXONE 100 GRAM(S): 500 INJECTION, POWDER, FOR SOLUTION INTRAMUSCULAR; INTRAVENOUS at 11:36

## 2018-04-09 RX ADMIN — HEPARIN SODIUM 5000 UNIT(S): 5000 INJECTION INTRAVENOUS; SUBCUTANEOUS at 14:57

## 2018-04-09 RX ADMIN — Medication 100 MILLIGRAM(S): at 22:06

## 2018-04-09 RX ADMIN — Medication 100 MILLIGRAM(S): at 06:39

## 2018-04-09 RX ADMIN — ATORVASTATIN CALCIUM 10 MILLIGRAM(S): 80 TABLET, FILM COATED ORAL at 22:06

## 2018-04-09 RX ADMIN — TAMSULOSIN HYDROCHLORIDE 0.4 MILLIGRAM(S): 0.4 CAPSULE ORAL at 22:06

## 2018-04-09 RX ADMIN — PANTOPRAZOLE SODIUM 40 MILLIGRAM(S): 20 TABLET, DELAYED RELEASE ORAL at 06:38

## 2018-04-09 NOTE — PROGRESS NOTE ADULT - SUBJECTIVE AND OBJECTIVE BOX
infectious diseases progress note:    ROBERT GOLDMANN is a 78y y. o. Male patient    Patient reports: feeling much better    ROS:    EYES:  Negative  blurry vision or double vision  GASTROINTESTINAL:  Negative for nausea, vomiting, diarrhea  -otherwise negative except for subjective    Allergies    No Known Allergies    Intolerances        ANTIBIOTICS/RELEVANT:  antimicrobials  cefTRIAXone   IVPB      cefTRIAXone   IVPB 2 Gram(s) IV Intermittent every 24 hours  metroNIDAZOLE    Tablet 500 milliGRAM(s) Oral every 8 hours    immunologic:    OTHER:  atorvastatin 10 milliGRAM(s) Oral at bedtime  dextrose 5%. 1000 milliLiter(s) IV Continuous <Continuous>  dextrose 50% Injectable 12.5 Gram(s) IV Push once  dextrose 50% Injectable 25 Gram(s) IV Push once  dextrose 50% Injectable 25 Gram(s) IV Push once  dextrose Gel 1 Dose(s) Oral once PRN  docusate sodium 100 milliGRAM(s) Oral three times a day  glucagon  Injectable 1 milliGRAM(s) IntraMuscular once PRN  guaiFENesin   Syrup  (Sugar-Free) 100 milliGRAM(s) Oral every 6 hours PRN  heparin  Injectable 5000 Unit(s) SubCutaneous every 8 hours  insulin lispro (HumaLOG) corrective regimen sliding scale   SubCutaneous three times a day before meals  levothyroxine 125 MICROGram(s) Oral daily  multivitamin 1 Tablet(s) Oral daily  omega-3-Acid Ethyl Esters 4 Gram(s) Oral daily  pantoprazole    Tablet 40 milliGRAM(s) Oral before breakfast  sodium chloride 0.9%. 1000 milliLiter(s) IV Continuous <Continuous>  tamsulosin 0.4 milliGRAM(s) Oral at bedtime      Objective:  Vital Signs Last 24 Hrs  T(C): 37 (09 Apr 2018 03:54), Max: 37 (09 Apr 2018 03:54)  T(F): 98.6 (09 Apr 2018 03:54), Max: 98.6 (09 Apr 2018 03:54)  HR: 76 (09 Apr 2018 03:54) (72 - 76)  BP: 129/90 (09 Apr 2018 03:54) (114/74 - 159/88)  BP(mean): --  RR: 18 (09 Apr 2018 03:54) (18 - 18)  SpO2: 95% (09 Apr 2018 03:54) (95% - 99%)    T(C): 37 (04-09-18 @ 03:54), Max: 37 (04-08-18 @ 03:55)  T(C): 37 (04-09-18 @ 03:54), Max: 37.8 (04-06-18 @ 19:45)  T(C): 37 (04-09-18 @ 03:54), Max: 37.8 (04-06-18 @ 19:45)    PHYSICAL EXAM:  Constitutional: Well-developed, well nourished  Eyes: PERRLA, EOMI  Ear/Nose/Throat: oropharynx normal	  Neck: no JVD, no lymphadenopathy, supple  Respiratory: no accessory muscle use  Cardiovascular: RRR,   Gastrointestinal: soft, NT, drain on right side with serosanguinous fluid  Extremities: no clubbing, no cyanosis, edema absent      LABS:                        12.3   6.78  )-----------( 459      ( 09 Apr 2018 07:46 )             36.8       6.78 04-09 @ 07:46  10.45 04-06 @ 07:33  9.36 04-05 @ 07:37  10.52 04-04 @ 07:59  15.11 04-03 @ 07:22  18.0 04-02 @ 11:35      04-09    134<L>  |  98  |  14  ----------------------------<  116<H>  4.2   |  26  |  0.84    Ca    8.7      09 Apr 2018 06:56        Creatinine, Serum: 0.84 mg/dL (04-09-18 @ 06:56)  Creatinine, Serum: 0.89 mg/dL (04-06-18 @ 06:28)  Creatinine, Serum: 0.85 mg/dL (04-05-18 @ 06:36)  Creatinine, Serum: 0.91 mg/dL (04-04-18 @ 05:59)  Creatinine, Serum: 0.98 mg/dL (04-03-18 @ 06:45)  Creatinine, Serum: 1.15 mg/dL (04-02-18 @ 11:35)      MICROBIOLOGY:      Culture - Body Fluid with Gram Stain (04.06.18 @ 21:41)    Gram Stain:   polymorphonuclear leukocytes seen  No organisms seen per oil power field    Specimen Source: .Body Fluid Abdominal Fluid    Culture Results:   No growth            RADIOLOGY & ADDITIONAL STUDIES:

## 2018-04-09 NOTE — PROGRESS NOTE ADULT - SUBJECTIVE AND OBJECTIVE BOX
Patient is a 78y old  Male who presents with a chief complaint of UTI, leukocytosis (03 Apr 2018 12:40)  pt seen and examined at bedside   SUBJECTIVE/OVERNIGHT events none, feels well    Vital Signs Last 24 Hrs  T(C): 36.7 (09 Apr 2018 13:41), Max: 37 (09 Apr 2018 03:54)  T(F): 98 (09 Apr 2018 13:41), Max: 98.6 (09 Apr 2018 03:54)  HR: 76 (09 Apr 2018 13:41) (72 - 76)  BP: 128/82 (09 Apr 2018 13:41) (128/82 - 159/88)  BP(mean): --  RR: 18 (09 Apr 2018 13:41) (18 - 18)  SpO2: 98% (09 Apr 2018 13:41) (95% - 99%)  CAPILLARY BLOOD GLUCOSE      POCT Blood Glucose.: 119 mg/dL (09 Apr 2018 08:15)  POCT Blood Glucose.: 121 mg/dL (08 Apr 2018 21:29)  POCT Blood Glucose.: 124 mg/dL (08 Apr 2018 16:52)    MEDICATIONS  (STANDING):  atorvastatin 10 milliGRAM(s) Oral at bedtime  cefTRIAXone   IVPB      cefTRIAXone   IVPB 2 Gram(s) IV Intermittent every 24 hours  dextrose 5%. 1000 milliLiter(s) (50 mL/Hr) IV Continuous <Continuous>  dextrose 50% Injectable 12.5 Gram(s) IV Push once  dextrose 50% Injectable 25 Gram(s) IV Push once  dextrose 50% Injectable 25 Gram(s) IV Push once  docusate sodium 100 milliGRAM(s) Oral three times a day  heparin  Injectable 5000 Unit(s) SubCutaneous every 8 hours  insulin lispro (HumaLOG) corrective regimen sliding scale   SubCutaneous three times a day before meals  levothyroxine 125 MICROGram(s) Oral daily  metroNIDAZOLE    Tablet 500 milliGRAM(s) Oral every 8 hours  multivitamin 1 Tablet(s) Oral daily  omega-3-Acid Ethyl Esters 4 Gram(s) Oral daily  pantoprazole    Tablet 40 milliGRAM(s) Oral before breakfast  sodium chloride 0.9%. 1000 milliLiter(s) (50 mL/Hr) IV Continuous <Continuous>  tamsulosin 0.4 milliGRAM(s) Oral at bedtime    MEDICATIONS  (PRN):  dextrose Gel 1 Dose(s) Oral once PRN Blood Glucose LESS THAN 70 milliGRAM(s)/deciliter  glucagon  Injectable 1 milliGRAM(s) IntraMuscular once PRN Glucose LESS THAN 70 milligrams/deciliter  guaiFENesin   Syrup  (Sugar-Free) 100 milliGRAM(s) Oral every 6 hours PRN Cough    PHYSICAL EXAM  General : comfortable, not in any acute distress  Neck : supple, no LAD, no JVD  Eyes : EOMI, PERRLA, conjunctiva : no icterus, no pallor  MM moist, no pharyngeal erythema/exudates  Pulmonary: clear to auscultation bilateral lung fields, no crackles/rhonchi/wheezing,   CVS : S1 S2,rate normal-,rhythm-regular, no murmurs, no abnormal sounds  Gastrointestinal: soft, non tender, non distended, no organomegaly , bowel sounds audible, RUQ drain  Extremities: no edema  Neuro: AAOx3, no focal deficits  Musculoskeletal:  FROM of all ext  Skin: no rash  LABS                        12.3   6.78  )-----------( 459      ( 09 Apr 2018 07:46 )             36.8     04-09    134<L>  |  98  |  14  ----------------------------<  116<H>  4.2   |  26  |  0.84    Ca    8.7      09 Apr 2018 06:56        Culture - Body Fluid with Gram Stain (collected 06 Apr 2018 21:41)  Source: .Body Fluid Abdominal Fluid  Gram Stain (06 Apr 2018 23:51):    polymorphonuclear leukocytes seen    No organisms seen per oil power field  Preliminary Report (07 Apr 2018 18:02):    No growth      RADIOLOGY and IMAGING reviewed: yes  Consultant notes reviewed : yes  Care discussed with Consultants/other providers :

## 2018-04-09 NOTE — PROGRESS NOTE ADULT - SUBJECTIVE AND OBJECTIVE BOX
Interventional Radiology Follow- Up Note    S: Patient feels well.    O:  Vitals: T(F): 98.6 (04-09-18 @ 03:54), Max: 98.6 (04-09-18 @ 03:54)  HR: 76 (04-09-18 @ 03:54) (72 - 76)  BP: 129/90 (04-09-18 @ 03:54) (114/74 - 159/88)  RR: 18 (04-09-18 @ 03:54) (18 - 18)  SpO2: 95% (04-09-18 @ 03:54) (95% - 99%)  Wt(kg): --    LABS:    04-09    134<L>  |  98  |  14  ----------------------------<  116<H>  4.2   |  26  |  0.84    Ca    8.7      09 Apr 2018 06:56      Culture - Body Fluid with Gram Stain (collected 06 Apr 2018 21:41)  Source: .Body Fluid Abdominal Fluid  Gram Stain (06 Apr 2018 23:51):    polymorphonuclear leukocytes seen    No organisms seen per oil power field  Preliminary Report (07 Apr 2018 18:02):    No growth      OUT:  Total OUT: 0 mL    PHYSICAL EXAM:    General: Awake, alert, in NAD  Abdomen: Soft, NT, ND. RUQ drain to DENNIS containing straw colored fluid    Impression: 78 year old gentleman s/p liver abscess drainage. Patient feels well. Minimal drain output over last 24 hours.    Plan:  -Continue to monitor drain output  -Flush drain 5 cc NS daily  -Antibiotics per ID     Please call IR at extension [phone] with any questions, concerns, or issues regarding above.

## 2018-04-09 NOTE — PROGRESS NOTE ADULT - PROBLEM SELECTOR PLAN 3
meets sepsis criteria with tachycardia and leukocytosis  rt hepatic abscess  s/p drainage-  Fluid cx ngtd to f/u  ID f/u noted  cont ceftriaxone

## 2018-04-10 LAB
ANION GAP SERPL CALC-SCNC: 10 MMOL/L — SIGNIFICANT CHANGE UP (ref 5–17)
BUN SERPL-MCNC: 12 MG/DL — SIGNIFICANT CHANGE UP (ref 7–23)
CALCIUM SERPL-MCNC: 8.8 MG/DL — SIGNIFICANT CHANGE UP (ref 8.4–10.5)
CHLORIDE SERPL-SCNC: 98 MMOL/L — SIGNIFICANT CHANGE UP (ref 96–108)
CO2 SERPL-SCNC: 26 MMOL/L — SIGNIFICANT CHANGE UP (ref 22–31)
CREAT SERPL-MCNC: 0.85 MG/DL — SIGNIFICANT CHANGE UP (ref 0.5–1.3)
GLUCOSE SERPL-MCNC: 122 MG/DL — HIGH (ref 70–99)
POTASSIUM SERPL-MCNC: 3.9 MMOL/L — SIGNIFICANT CHANGE UP (ref 3.5–5.3)
POTASSIUM SERPL-SCNC: 3.9 MMOL/L — SIGNIFICANT CHANGE UP (ref 3.5–5.3)
SODIUM SERPL-SCNC: 134 MMOL/L — LOW (ref 135–145)

## 2018-04-10 RX ORDER — POLYETHYLENE GLYCOL 3350 17 G/17G
17 POWDER, FOR SOLUTION ORAL DAILY
Qty: 0 | Refills: 0 | Status: DISCONTINUED | OUTPATIENT
Start: 2018-04-10 | End: 2018-04-11

## 2018-04-10 RX ORDER — SENNA PLUS 8.6 MG/1
2 TABLET ORAL AT BEDTIME
Qty: 0 | Refills: 0 | Status: DISCONTINUED | OUTPATIENT
Start: 2018-04-10 | End: 2018-04-11

## 2018-04-10 RX ADMIN — POLYETHYLENE GLYCOL 3350 17 GRAM(S): 17 POWDER, FOR SOLUTION ORAL at 11:28

## 2018-04-10 RX ADMIN — Medication 4 GRAM(S): at 11:28

## 2018-04-10 RX ADMIN — Medication 500 MILLIGRAM(S): at 06:14

## 2018-04-10 RX ADMIN — Medication 500 MILLIGRAM(S): at 22:41

## 2018-04-10 RX ADMIN — Medication 100 MILLIGRAM(S): at 06:14

## 2018-04-10 RX ADMIN — HEPARIN SODIUM 5000 UNIT(S): 5000 INJECTION INTRAVENOUS; SUBCUTANEOUS at 06:14

## 2018-04-10 RX ADMIN — Medication 100 MILLIGRAM(S): at 14:31

## 2018-04-10 RX ADMIN — ATORVASTATIN CALCIUM 10 MILLIGRAM(S): 80 TABLET, FILM COATED ORAL at 22:41

## 2018-04-10 RX ADMIN — SENNA PLUS 2 TABLET(S): 8.6 TABLET ORAL at 22:42

## 2018-04-10 RX ADMIN — HEPARIN SODIUM 5000 UNIT(S): 5000 INJECTION INTRAVENOUS; SUBCUTANEOUS at 14:31

## 2018-04-10 RX ADMIN — Medication 1 TABLET(S): at 11:29

## 2018-04-10 RX ADMIN — TAMSULOSIN HYDROCHLORIDE 0.4 MILLIGRAM(S): 0.4 CAPSULE ORAL at 22:41

## 2018-04-10 RX ADMIN — PANTOPRAZOLE SODIUM 40 MILLIGRAM(S): 20 TABLET, DELAYED RELEASE ORAL at 06:14

## 2018-04-10 RX ADMIN — Medication 100 MILLIGRAM(S): at 22:41

## 2018-04-10 RX ADMIN — HEPARIN SODIUM 5000 UNIT(S): 5000 INJECTION INTRAVENOUS; SUBCUTANEOUS at 22:42

## 2018-04-10 RX ADMIN — Medication 125 MICROGRAM(S): at 06:15

## 2018-04-10 RX ADMIN — CEFTRIAXONE 100 GRAM(S): 500 INJECTION, POWDER, FOR SOLUTION INTRAMUSCULAR; INTRAVENOUS at 10:30

## 2018-04-10 RX ADMIN — Medication 500 MILLIGRAM(S): at 14:31

## 2018-04-10 NOTE — PROGRESS NOTE ADULT - PROBLEM SELECTOR PLAN 9
Heparin SC for DVT ppx  PT evaluation

## 2018-04-10 NOTE — PROGRESS NOTE ADULT - ATTENDING COMMENTS
Discussed with family at bedside.  All Q's answered.    Elvis Cruz MD  [phone]
I'm available for issues over the remainder of the weekend, please call if ID input needed.     Elvis Cruz MD  [phone]
Liam Catherine will be covering me starting 4/4/18. He can be reached at  if needed.     - Dr. CASSANDRA Nevarez ([facility])  - [phone]
Nancy Hill MD ( [facility])  [phone]
Nancy Hill MD ( [facility])  [phone]
Nancy Hill MD ( [facility])  [phone]
Nancy Hill MD ( [facility])  [phone]

## 2018-04-10 NOTE — DIETITIAN INITIAL EVALUATION ADULT. - ENERGY NEEDS
HT 77 inches,  pounds,  pounds, BMI 24.6  Dxd with UTI, change in MS, Liver abscess.  Skin intact.  Very well nourished and fit.

## 2018-04-10 NOTE — PROGRESS NOTE ADULT - SUBJECTIVE AND OBJECTIVE BOX
infectious diseases progress note:    ROBERT GOLDMANN is a 78y y. o. Male patient    Patient reports: feeling better and would like to go home tomorrow    ROS:    EYES:  Negative  blurry vision or double vision  GASTROINTESTINAL:  Negative for nausea, vomiting, diarrhea  -otherwise negative except for subjective    Allergies    No Known Allergies    Intolerances        ANTIBIOTICS/RELEVANT:  antimicrobials  cefTRIAXone   IVPB      cefTRIAXone   IVPB 2 Gram(s) IV Intermittent every 24 hours  metroNIDAZOLE    Tablet 500 milliGRAM(s) Oral every 8 hours    immunologic:    OTHER:  atorvastatin 10 milliGRAM(s) Oral at bedtime  dextrose 5%. 1000 milliLiter(s) IV Continuous <Continuous>  dextrose 50% Injectable 12.5 Gram(s) IV Push once  dextrose 50% Injectable 25 Gram(s) IV Push once  dextrose 50% Injectable 25 Gram(s) IV Push once  dextrose Gel 1 Dose(s) Oral once PRN  docusate sodium 100 milliGRAM(s) Oral three times a day  glucagon  Injectable 1 milliGRAM(s) IntraMuscular once PRN  guaiFENesin   Syrup  (Sugar-Free) 100 milliGRAM(s) Oral every 6 hours PRN  heparin  Injectable 5000 Unit(s) SubCutaneous every 8 hours  insulin lispro (HumaLOG) corrective regimen sliding scale   SubCutaneous three times a day before meals  levothyroxine 125 MICROGram(s) Oral daily  multivitamin 1 Tablet(s) Oral daily  omega-3-Acid Ethyl Esters 4 Gram(s) Oral daily  pantoprazole    Tablet 40 milliGRAM(s) Oral before breakfast  polyethylene glycol 3350 17 Gram(s) Oral daily  senna 2 Tablet(s) Oral at bedtime  sodium chloride 0.9%. 1000 milliLiter(s) IV Continuous <Continuous>  tamsulosin 0.4 milliGRAM(s) Oral at bedtime      Objective:  Vital Signs Last 24 Hrs  T(C): 37.1 (09 Apr 2018 19:38), Max: 37.1 (09 Apr 2018 19:38)  T(F): 98.7 (09 Apr 2018 19:38), Max: 98.7 (09 Apr 2018 19:38)  HR: 83 (09 Apr 2018 19:38) (76 - 83)  BP: 148/84 (09 Apr 2018 19:38) (128/82 - 148/84)  BP(mean): --  RR: 18 (09 Apr 2018 19:38) (18 - 18)  SpO2: 98% (09 Apr 2018 19:38) (98% - 98%)    T(C): 37.1 (04-09-18 @ 19:38), Max: 37.1 (04-09-18 @ 19:38)  T(C): 37.1 (04-09-18 @ 19:38), Max: 37.1 (04-09-18 @ 19:38)  T(C): 37.1 (04-09-18 @ 19:38), Max: 37.8 (04-06-18 @ 19:45)    PHYSICAL EXAM:  Constitutional: Well-developed, well nourished  Eyes: PERRLA, EOMI  Ear/Nose/Throat: oropharynx normal	  Neck: no JVD, no lymphadenopathy, supple  Respiratory: no accessory muscle use  Cardiovascular: RRR,   Gastrointestinal: soft, NT  Extremities: no clubbing, no cyanosis, edema absent      LABS:                        12.3   6.78  )-----------( 459      ( 09 Apr 2018 07:46 )             36.8       6.78 04-09 @ 07:46  10.45 04-06 @ 07:33  9.36 04-05 @ 07:37  10.52 04-04 @ 07:59      04-10    134<L>  |  98  |  12  ----------------------------<  122<H>  3.9   |  26  |  0.85    Ca    8.8      10 Apr 2018 05:30        Creatinine, Serum: 0.85 mg/dL (04-10-18 @ 05:30)  Creatinine, Serum: 0.84 mg/dL (04-09-18 @ 06:56)  Creatinine, Serum: 0.89 mg/dL (04-06-18 @ 06:28)  Creatinine, Serum: 0.85 mg/dL (04-05-18 @ 06:36)  Creatinine, Serum: 0.91 mg/dL (04-04-18 @ 05:59)                MICROBIOLOGY:              RADIOLOGY & ADDITIONAL STUDIES:

## 2018-04-10 NOTE — PROGRESS NOTE ADULT - PROBLEM SELECTOR PROBLEM 1
Liver abscess
Urinary tract infection without hematuria, site unspecified
Liver abscess
R/O Sepsis, due to unspecified organism
Urinary tract infection without hematuria, site unspecified

## 2018-04-10 NOTE — PROGRESS NOTE ADULT - PROBLEM SELECTOR PLAN 6
c/w Synthroid 125mcg qd

## 2018-04-10 NOTE — PROGRESS NOTE ADULT - ASSESSMENT
78 M with h/o DMT2, HTN, GERD, Hypothyroidism ( 2/2 partial thyroidectomy) who presents with mild altered status, urinary frequency and incontinence  x 4 days. He c/o associated intermittent chills and sweats, productive cough, unsteady gait and decreased PO intake. Physical exam is notable for AAO X 3, no abd tenderness , no flank tenderness. Labs are notable for leukocytosis, hyponatremia, UA negative for UTI ( after 3 days of PO abx) and elevated LFTs. EKG is NSR with no acute ischemic changes.
78 M with h/o DMT2, HTN, GERD, Hypothyroidism ( 2/2 partial thyroidectomy) who presents with mild altered status, urinary frequency and incontinence  x 4 days. He c/o associated intermittent chills and sweats, productive cough, unsteady gait and decreased PO intake. Physical exam is notable for AAO X 3, no abd tenderness , no flank tenderness. Labs are notable for leukocytosis, hyponatremia, UA negative for UTI ( after 3 days of PO abx) and elevated LFTs. EKG is NSR with no acute ischemic changes.
78-yo Male with h/o DMT2, HTN, GERD, Hypothyroidism ( 2/2 partial thyroidectomy) who presents with mild altered status, urinary frequency and incontinence  x 4 days.
Chills with subjective fever, sweats, leukocytosis at presentation with elevated LFT likely related to liver abscess.  No concern of UTI on the basis of history at present  Diverticulosis, no active diverticulitis.  However no culture data before abx obtained.
78-yo Male with h/o DMT2, HTN, GERD, Hypothyroidism ( 2/2 partial thyroidectomy) who presents with mild altered status, urinary frequency and incontinence  x 4 days.
Chills with subjective fever, sweats, leukocytosis at presentation with elevated LFT likely related to liver abscess.
Chills with subjective fever, sweats, leukocytosis at presentation with elevated LFT likely related to liver abscess.  No concern of UTI on the basis of history at present  Diverticulosis, no active diverticulitis.  However no culture data before abx obtained.
Chills with subjective fever, sweats, leukocytosis at presentation with elevated LFT likely related to liver abscess.  No concern of UTI on the basis of history at present  Tender LLQ, CT was only of abdomen, not pelvis. Would exclude occult diverticulitis as potential source of liver abscess.  However no culture data before abx obtained.  No clear adjacent focus to explain liver abscess. Has stones but but no inflammatory changes around GB. Hematogenous source from elsewhere in DDx too. No murmur to implicate endocarditis. No over odontogenic focus. No travel/animal exposure to invoke Echinococcus or E. histolytica.
78-yo Male with h/o DMT2, HTN, GERD, Hypothyroidism ( 2/2 partial thyroidectomy) who presents with mild altered status, urinary frequency and incontinence  x 4 days.

## 2018-04-10 NOTE — PROGRESS NOTE ADULT - PROBLEM SELECTOR PROBLEM 2
Hyponatremia
Urinary tract infection without hematuria, site unspecified
Hyponatremia

## 2018-04-10 NOTE — PROGRESS NOTE ADULT - PROBLEM SELECTOR PLAN 4
likely 2/2 ongoing sepsis   incidentally noted hepatic abscesss   monitor LFTs  CT abdomen reviewed
likely 2/2 ongoing sepsis   and hepatic abscesss   monitor LFTs  CT abdomen/pelvis reviewed
likely 2/2 ongoing sepsis   incidentally noted hepatic abscesss   monitor LFTs  CT abdomen reviewed
likely 2/2 ongoing sepsis   incidentally noted hepatic abscesss vs. cyst on abd US.   monitor LFTs  CT abdomen ordered.
likely 2/2 ongoing sepsis   and hepatic abscesss   monitor LFTs  CT abdomen/pelvis reviewed
likely 2/2 ongoing sepsis   incidentally noted hepatic abscesss   monitor LFTs  CT abdomen reviewed

## 2018-04-10 NOTE — PROGRESS NOTE ADULT - PROBLEM SELECTOR PLAN 7
BP improved in the setting of sepsis  will hold Losartan for now  monitor BP
BP currently low in the setting of sepsis  will hold Losartan for now  monitor BP
BP improved in the setting of sepsis  will hold Losartan for now  monitor BP

## 2018-04-10 NOTE — PROGRESS NOTE ADULT - PROBLEM SELECTOR PROBLEM 3
Sepsis, due to unspecified organism
Hyponatremia
Sepsis, due to unspecified organism

## 2018-04-10 NOTE — PROGRESS NOTE ADULT - PROBLEM SELECTOR PLAN 5
on Janumet at home; hold  cont ARNOLD  DM diet  FS qac qhs
on Janumet at home; will hold  start ARNOLD  DM diet  FS qac qhs
on Janumet at home; hold  cont ARNOLD  DM diet  FS qac qhs

## 2018-04-10 NOTE — PROGRESS NOTE ADULT - PROBLEM SELECTOR PLAN 1
Cont Abx, will follow up with recommendations on when patient can switch to PO and duration of abx.  Serial exams/imaging
pt presents with urinary frequency and incontinence in the setting of AMS  UA currently negative for UTI after Ciprofloxacin x 3 days as outpt.
pt presents with urinary frequency and incontinence in the setting of AMS  UA currently negative for UTI after Ciprofloxacin x 3 days as outpt.   will c/w Ceftriaxone 1g IVPB qd for now  follow bld cx.   urine cx neg.
pt presents with urinary frequency and incontinence in the setting of AMS  UA currently negative for UTI after Ciprofloxacin x 3 days as outpt.   will c/w Ceftriaxone 1g IVPB qd for now  urine cx neg.
Cont Abx  F/U Cx data  Serial exams/imaging
Cont Abx IV for today  can switch to PO 4/11 and recommend:  cefuroxime 500mg PO BID  Flagyl 500mg PO TID until 4/25 with office follow up with Dr Cruz in 2 weeks  Fine for d/c 4/11 from ID perspective  Serial exams/imaging and followup for drain management and timing of removal.    Thank you for consulting us and involving us in the management of this most interesting and challenging case.     Please Call with any further questions
F/U repeat CT  Cont Abx  Await IR eval
meets sepsis criteria with tachycardia and leukocytosis  rt hepatic abscess  s/p drainage-  Fluid cx ngtd to f/u  ID f/u noted Liver abscess.  Plan: Cont Abx IV for today  can switch to PO 4/11 and recommend:  cefuroxime 500mg PO BID  Flagyl 500mg PO TID until 4/25 with office follow up with Dr Cruz in 2 weeks  Fine for d/c 4/11 from ID perspective  Serial exams/imaging and followup for drain management and timing of removal
pt presents with urinary frequency and incontinence in the setting of AMS  UA currently negative for UTI after Ciprofloxacin x 3 days as outpt.   will c/w Ceftriaxone for now  urine cx neg.

## 2018-04-10 NOTE — PROGRESS NOTE ADULT - PROBLEM SELECTOR PROBLEM 8
Benign prostatic hyperplasia, unspecified whether lower urinary tract symptoms present

## 2018-04-10 NOTE — DIETITIAN INITIAL EVALUATION ADULT. - OTHER INFO
Patient seen for routine LOS assessment.  Patient found sitting up in chair.  Receptive to RDN.  Reports to be eating fine.  Watches diet at home and has excellent control over diabetes.  A1c 6.0.  Eats 3-4 meals daily.  Breakfast is oatmeal, maybe an egg.  Mid morning has multigrain english muffin.  Lunch is a sandwich, fruit.  dinner is a hot cooked meal with meat or chicken, starch and vegetable.  Drinks 3-4 cups of coffee daily.  Likes to snack on fruit.  Supplements with cinnamon, fish oil, occuvite, MVI.  Well nourished and manages self care optimally.

## 2018-04-10 NOTE — PROGRESS NOTE ADULT - SUBJECTIVE AND OBJECTIVE BOX
Patient is a 78y old  Male who presents with a chief complaint of UTI, leukocytosis (03 Apr 2018 12:40)  pt seen and examined at bedside   SUBJECTIVE/OVERNIGHT events   Vital Signs Last 24 Hrs  T(C): 37.1 (09 Apr 2018 19:38), Max: 37.1 (09 Apr 2018 19:38)  T(F): 98.7 (09 Apr 2018 19:38), Max: 98.7 (09 Apr 2018 19:38)  HR: 83 (09 Apr 2018 19:38) (83 - 83)  BP: 148/84 (09 Apr 2018 19:38) (148/84 - 148/84)  BP(mean): --  RR: 18 (09 Apr 2018 19:38) (18 - 18)  SpO2: 98% (09 Apr 2018 19:38) (98% - 98%)  CAPILLARY BLOOD GLUCOSE      POCT Blood Glucose.: 132 mg/dL (10 Apr 2018 11:59)  POCT Blood Glucose.: 122 mg/dL (10 Apr 2018 07:56)  POCT Blood Glucose.: 125 mg/dL (09 Apr 2018 21:22)  POCT Blood Glucose.: 130 mg/dL (09 Apr 2018 16:54)    MEDICATIONS  (STANDING):  atorvastatin 10 milliGRAM(s) Oral at bedtime  cefTRIAXone   IVPB      cefTRIAXone   IVPB 2 Gram(s) IV Intermittent every 24 hours  dextrose 5%. 1000 milliLiter(s) (50 mL/Hr) IV Continuous <Continuous>  dextrose 50% Injectable 12.5 Gram(s) IV Push once  dextrose 50% Injectable 25 Gram(s) IV Push once  dextrose 50% Injectable 25 Gram(s) IV Push once  docusate sodium 100 milliGRAM(s) Oral three times a day  heparin  Injectable 5000 Unit(s) SubCutaneous every 8 hours  insulin lispro (HumaLOG) corrective regimen sliding scale   SubCutaneous three times a day before meals  levothyroxine 125 MICROGram(s) Oral daily  metroNIDAZOLE    Tablet 500 milliGRAM(s) Oral every 8 hours  multivitamin 1 Tablet(s) Oral daily  omega-3-Acid Ethyl Esters 4 Gram(s) Oral daily  pantoprazole    Tablet 40 milliGRAM(s) Oral before breakfast  polyethylene glycol 3350 17 Gram(s) Oral daily  senna 2 Tablet(s) Oral at bedtime  sodium chloride 0.9%. 1000 milliLiter(s) (50 mL/Hr) IV Continuous <Continuous>  tamsulosin 0.4 milliGRAM(s) Oral at bedtime    MEDICATIONS  (PRN):  dextrose Gel 1 Dose(s) Oral once PRN Blood Glucose LESS THAN 70 milliGRAM(s)/deciliter  glucagon  Injectable 1 milliGRAM(s) IntraMuscular once PRN Glucose LESS THAN 70 milligrams/deciliter  guaiFENesin   Syrup  (Sugar-Free) 100 milliGRAM(s) Oral every 6 hours PRN Cough    PHYSICAL EXAM  General : comfortable, not in any acute distress  Neck : supple, no LAD, no JVD  Eyes : EOMI, PERRLA, conjunctiva : no icterus, no pallor  MM moist, no pharyngeal erythema/exudates  Pulmonary: clear to auscultation bilateral lung fields, no crackles/rhonchi/wheezing,   CVS : S1 S2,rate normal-,rhythm-regular, no murmurs, no abnormal sounds  Gastrointestinal: soft, non tender, non distended, no organomegaly , bowel sounds audible, RUQ drain  Extremities: no edema  Neuro: AAOx3, no focal deficits  Musculoskeletal:  FROM of all ext  Skin: no rash                        12.3   6.78  )-----------( 459      ( 09 Apr 2018 07:46 )             36.8     04-10    134<L>  |  98  |  12  ----------------------------<  122<H>  3.9   |  26  |  0.85    Ca    8.8      10 Apr 2018 05:30        RADIOLOGY and IMAGING reviewed: yes  Consultant notes reviewed : yes  Care discussed with Consultants/other providers :

## 2018-04-10 NOTE — PROGRESS NOTE ADULT - PROBLEM SELECTOR PLAN 2
in the setting of poor PO intake  c/w IVF NS @ 50cc/hr x 8 hrs  Na improved from 126 to 133.   encourage PO intake.
in the setting of poor PO intake  improving
in the setting of poor PO intake  s/p IVF NS @ 50cc/hr x 8 hrs  Na improved from 126 to 133.   encourage PO intake.
in the setting of poor PO intake  improving
in the setting of poor PO intake  improving
in the setting of poor PO intake  s/p IVF NS @ 50cc/hr x 8 hrs  daily BMP
pt presents with urinary frequency and incontinence in the setting of AMS  UA currently negative for UTI after Ciprofloxacin x 3 days as outpt.
in the setting of poor PO intake  s/p IVF NS @ 50cc/hr x 8 hrs  daily BMP

## 2018-04-10 NOTE — PROGRESS NOTE ADULT - PROBLEM SELECTOR PROBLEM 6
Hypothyroidism, unspecified type

## 2018-04-11 VITALS — WEIGHT: 207.23 LBS

## 2018-04-11 LAB
CULTURE RESULTS: SIGNIFICANT CHANGE UP
SPECIMEN SOURCE: SIGNIFICANT CHANGE UP

## 2018-04-11 PROCEDURE — 82962 GLUCOSE BLOOD TEST: CPT

## 2018-04-11 PROCEDURE — 72193 CT PELVIS W/DYE: CPT

## 2018-04-11 PROCEDURE — 82803 BLOOD GASES ANY COMBINATION: CPT

## 2018-04-11 PROCEDURE — 83605 ASSAY OF LACTIC ACID: CPT

## 2018-04-11 PROCEDURE — 87040 BLOOD CULTURE FOR BACTERIA: CPT

## 2018-04-11 PROCEDURE — 87070 CULTURE OTHR SPECIMN AEROBIC: CPT

## 2018-04-11 PROCEDURE — 49405 IMAGE CATH FLUID COLXN VISC: CPT

## 2018-04-11 PROCEDURE — 80053 COMPREHEN METABOLIC PANEL: CPT

## 2018-04-11 PROCEDURE — 82330 ASSAY OF CALCIUM: CPT

## 2018-04-11 PROCEDURE — C1769: CPT

## 2018-04-11 PROCEDURE — 82435 ASSAY OF BLOOD CHLORIDE: CPT

## 2018-04-11 PROCEDURE — 84295 ASSAY OF SERUM SODIUM: CPT

## 2018-04-11 PROCEDURE — 83036 HEMOGLOBIN GLYCOSYLATED A1C: CPT

## 2018-04-11 PROCEDURE — 85027 COMPLETE CBC AUTOMATED: CPT

## 2018-04-11 PROCEDURE — 85610 PROTHROMBIN TIME: CPT

## 2018-04-11 PROCEDURE — 82947 ASSAY GLUCOSE BLOOD QUANT: CPT

## 2018-04-11 PROCEDURE — 87205 SMEAR GRAM STAIN: CPT

## 2018-04-11 PROCEDURE — C1729: CPT

## 2018-04-11 PROCEDURE — 85730 THROMBOPLASTIN TIME PARTIAL: CPT

## 2018-04-11 PROCEDURE — 81001 URINALYSIS AUTO W/SCOPE: CPT

## 2018-04-11 PROCEDURE — 71045 X-RAY EXAM CHEST 1 VIEW: CPT

## 2018-04-11 PROCEDURE — 93005 ELECTROCARDIOGRAM TRACING: CPT

## 2018-04-11 PROCEDURE — 87086 URINE CULTURE/COLONY COUNT: CPT

## 2018-04-11 PROCEDURE — 85014 HEMATOCRIT: CPT

## 2018-04-11 PROCEDURE — 80048 BASIC METABOLIC PNL TOTAL CA: CPT

## 2018-04-11 PROCEDURE — 99285 EMERGENCY DEPT VISIT HI MDM: CPT

## 2018-04-11 PROCEDURE — 74160 CT ABDOMEN W/CONTRAST: CPT

## 2018-04-11 PROCEDURE — 97530 THERAPEUTIC ACTIVITIES: CPT

## 2018-04-11 PROCEDURE — 76700 US EXAM ABDOM COMPLETE: CPT

## 2018-04-11 PROCEDURE — 97116 GAIT TRAINING THERAPY: CPT

## 2018-04-11 PROCEDURE — 87075 CULTR BACTERIA EXCEPT BLOOD: CPT

## 2018-04-11 PROCEDURE — 97161 PT EVAL LOW COMPLEX 20 MIN: CPT

## 2018-04-11 PROCEDURE — 84132 ASSAY OF SERUM POTASSIUM: CPT

## 2018-04-11 RX ORDER — METRONIDAZOLE 500 MG
1 TABLET ORAL
Qty: 42 | Refills: 0
Start: 2018-04-11 | End: 2018-04-24

## 2018-04-11 RX ORDER — CEFUROXIME AXETIL 250 MG
1 TABLET ORAL
Qty: 28 | Refills: 0
Start: 2018-04-11 | End: 2018-04-24

## 2018-04-11 RX ORDER — METRONIDAZOLE 500 MG
1 TABLET ORAL
Qty: 0 | Refills: 0 | COMMUNITY
Start: 2018-04-11

## 2018-04-11 RX ADMIN — Medication 125 MICROGRAM(S): at 06:34

## 2018-04-11 RX ADMIN — Medication 500 MILLIGRAM(S): at 06:34

## 2018-04-11 RX ADMIN — PANTOPRAZOLE SODIUM 40 MILLIGRAM(S): 20 TABLET, DELAYED RELEASE ORAL at 06:34

## 2018-04-11 RX ADMIN — Medication 100 MILLIGRAM(S): at 06:36

## 2018-04-11 RX ADMIN — Medication 4 GRAM(S): at 11:47

## 2018-04-11 RX ADMIN — Medication 1 TABLET(S): at 11:46

## 2018-04-11 RX ADMIN — HEPARIN SODIUM 5000 UNIT(S): 5000 INJECTION INTRAVENOUS; SUBCUTANEOUS at 06:34

## 2018-04-11 RX ADMIN — CEFTRIAXONE 100 GRAM(S): 500 INJECTION, POWDER, FOR SOLUTION INTRAMUSCULAR; INTRAVENOUS at 10:09

## 2018-04-11 NOTE — PROGRESS NOTE ADULT - PROVIDER SPECIALTY LIST ADULT
Infectious Disease
Internal Medicine
Intervent Radiology
Infectious Disease
Internal Medicine

## 2018-04-11 NOTE — PROGRESS NOTE ADULT - SUBJECTIVE AND OBJECTIVE BOX
Interventional Radiology Follow- Up Note    S: Patient feels well.     O:  Vitals: T(F): 98.2 (04-11-18 @ 04:09), Max: 98.2 (04-11-18 @ 04:09)  HR: 81 (04-11-18 @ 04:09) (81 - 82)  BP: 133/77 (04-11-18 @ 04:09) (133/77 - 142/86)  RR: 18 (04-11-18 @ 04:09) (18 - 18)  SpO2: 98% (04-11-18 @ 04:09) (98% - 98%)    LABS:             OUT:    Bulb: 5 mL    Culture - Body Fluid with Gram Stain (04.06.18 @ 21:41)    Gram Stain:   polymorphonuclear leukocytes seen  No organisms seen per oil power field    Specimen Source: .Body Fluid Abdominal Fluid    Culture Results:   No growth      PHYSICAL EXAM:    General: Nontoxic, in NAD  Abdomen: Soft, NT, ND. RUQ drain to DENNIS suction containing serosanguineous fluid.    Impression: 78 year old gentleman s/p liver abscess drainage. Patient doing well.    Plan:  -Antibiotics per ID  -Follow up with IR as outpatient for tube check. Scheduling number was provided for patient     Please call IR at extension [phone] with any questions, concerns, or issues regarding above.

## 2018-04-17 ENCOUNTER — APPOINTMENT (OUTPATIENT)
Dept: CT IMAGING | Facility: HOSPITAL | Age: 78
End: 2018-04-17

## 2018-04-17 ENCOUNTER — OUTPATIENT (OUTPATIENT)
Dept: OUTPATIENT SERVICES | Facility: HOSPITAL | Age: 78
LOS: 1 days | End: 2018-04-17
Payer: COMMERCIAL

## 2018-04-17 DIAGNOSIS — K75.0 ABSCESS OF LIVER: ICD-10-CM

## 2018-04-17 DIAGNOSIS — E89.0 POSTPROCEDURAL HYPOTHYROIDISM: Chronic | ICD-10-CM

## 2018-04-17 PROCEDURE — 74150 CT ABDOMEN W/O CONTRAST: CPT | Mod: 26

## 2018-04-17 PROCEDURE — 74150 CT ABDOMEN W/O CONTRAST: CPT

## 2018-04-17 PROCEDURE — C1769: CPT

## 2018-04-17 PROCEDURE — 49424 ASSESS CYST CONTRAST INJECT: CPT

## 2018-04-17 PROCEDURE — 76080 X-RAY EXAM OF FISTULA: CPT

## 2018-04-17 PROCEDURE — 76080 X-RAY EXAM OF FISTULA: CPT | Mod: 26

## 2018-04-19 DIAGNOSIS — Z43.4 ENCOUNTER FOR ATTENTION TO OTHER ARTIFICIAL OPENINGS OF DIGESTIVE TRACT: ICD-10-CM

## 2018-04-19 DIAGNOSIS — K75.0 ABSCESS OF LIVER: ICD-10-CM

## 2019-02-13 ENCOUNTER — APPOINTMENT (OUTPATIENT)
Dept: ORTHOPEDIC SURGERY | Facility: CLINIC | Age: 79
End: 2019-02-13
Payer: COMMERCIAL

## 2019-02-13 VITALS
HEIGHT: 76 IN | WEIGHT: 210 LBS | BODY MASS INDEX: 25.57 KG/M2 | HEART RATE: 70 BPM | DIASTOLIC BLOOD PRESSURE: 89 MMHG | SYSTOLIC BLOOD PRESSURE: 149 MMHG

## 2019-02-13 DIAGNOSIS — M48.07 SPINAL STENOSIS, LUMBOSACRAL REGION: ICD-10-CM

## 2019-02-13 PROCEDURE — 72100 X-RAY EXAM L-S SPINE 2/3 VWS: CPT

## 2019-02-13 PROCEDURE — 99213 OFFICE O/P EST LOW 20 MIN: CPT

## 2019-02-13 NOTE — PHYSICAL EXAM
[Normal] : normal [Limited] : is limited [UE/LE] : Sensory: Intact in bilateral upper & lower extremities [ALL] : dorsalis pedis, posterior tibial, femoral, popliteal, and radial 2+ and symmetric bilaterally [Poor Appearance] : well-appearing [Acute Distress] : not in acute distress [Obese] : not obese [Abl Mood] : in a normal mood [Abl Affect] : with normal affect [Poor Coordination] : normal coordination [Disorientation] : oriented x 3 [FreeTextEntry2] : incision healed\par neuro intact 5/5 tibialis anterior and the left which is significantly improved from preoperative strength\par 5 out of 5 motor strength, sensation is intact and symmetrical full range of motion flexion extension and rotation, no palpatory tenderness full range of motion of hips knees shoulders and elbows (all four extremities), no atrophy, negative straight leg raise, no pathological reflexes, no swelling, normal ambulation, no apparent distress skin is intact, no palpable lymph nodes, no upper or lower extremity instability, alert and oriented x3 and normal mood. Normal finger-to nose test.  [de-identified] : AP/lat lumbar-Adequate lumbar decompression and fusion [id number] with the patient and wife.

## 2019-02-13 NOTE — DISCUSSION/SUMMARY
[de-identified] : 2.5 years post-op laminectomy and fusion doing well.\par We discussed all options.\par F/U 1 year.\par All questions were answered, all alternatives discussed and the patient is in complete agreement with that plan. Follow-up appointment as instructed. Any issues and the patient will call or come in sooner.

## 2019-02-13 NOTE — HISTORY OF PRESENT ILLNESS
[Improving] : improving [All Other ROS Normal] : All other review of systems are negative except as noted [Headache] : no headache [Dizziness] : no dizziness [Fainting] : no fainting [FreeTextEntry1] : 2.5 years s/p laminectomy and fusion-doing well\par here with wife [FreeTextEntry2] : Happy with surgx.\par Fantastic\par No fever chills sweats nausea vomiting no bowel,  no recent weight loss or gain no night pain. This history is in addition to the intake form that I personally reviewed.

## 2019-10-25 ENCOUNTER — RESULT REVIEW (OUTPATIENT)
Age: 79
End: 2019-10-25

## 2019-10-28 NOTE — PROGRESS NOTE ADULT - NSHPATTENDINGPLANDISCUSS_GEN_ALL_CORE
"Requested Prescriptions   Pending Prescriptions Disp Refills     levothyroxine (SYNTHROID/LEVOTHROID) 75 MCG tablet [Pharmacy Med Name: LEVOTHYROXIN 75MCG  TAB]  Last Written Prescription Date:  1/22/2019  Last Fill Quantity: 90 tablet,  # refills: 2   Last office visit: 3/6/2019 with prescribing provider:  Pawel   Future Office Visit:   Next 5 appointments (look out 90 days)    Nov 15, 2019  9:45 AM CST  Return Visit with Ramirez Patel MD  [facility] ([facility]) [address] [place] [id number] [phone] OPT 2          90 tablet 2     Sig: TAKE 1 TABLET BY MOUTH ONCE DAILY       Thyroid Protocol Passed - 10/28/2019 11:14 AM        Passed - Patient is 12 years or older        Passed - Recent (12 mo) or future (30 days) visit within the authorizing provider's specialty     Patient has had an office visit with the authorizing provider or a provider within the authorizing providers department within the previous 12 mos or has a future within next 30 days. See \"Patient Info\" tab in inbasket, or \"Choose Columns\" in Meds & Orders section of the refill encounter.              Passed - Medication is active on med list        Passed - Normal TSH on file in past 12 months     Recent Labs   Lab Test 01/03/19  1525   TSH 3.58                 "
pt and medicine np
pt
pt and medicine np
pt, the wife and the daughter at bedside.
pt

## 2019-11-07 ENCOUNTER — APPOINTMENT (OUTPATIENT)
Dept: SURGICAL ONCOLOGY | Facility: CLINIC | Age: 79
End: 2019-11-07
Payer: COMMERCIAL

## 2019-11-07 VITALS
BODY MASS INDEX: 24.96 KG/M2 | HEIGHT: 76 IN | TEMPERATURE: 98.4 F | HEART RATE: 89 BPM | DIASTOLIC BLOOD PRESSURE: 76 MMHG | WEIGHT: 205 LBS | SYSTOLIC BLOOD PRESSURE: 160 MMHG | RESPIRATION RATE: 18 BRPM | OXYGEN SATURATION: 98 %

## 2019-11-07 DIAGNOSIS — Z86.39 PERSONAL HISTORY OF OTHER ENDOCRINE, NUTRITIONAL AND METABOLIC DISEASE: ICD-10-CM

## 2019-11-07 DIAGNOSIS — Z86.79 PERSONAL HISTORY OF OTHER DISEASES OF THE CIRCULATORY SYSTEM: ICD-10-CM

## 2019-11-07 PROCEDURE — 99244 OFF/OP CNSLTJ NEW/EST MOD 40: CPT

## 2019-11-07 RX ORDER — METFORMIN HYDROCHLORIDE 625 MG/1
TABLET ORAL
Refills: 0 | Status: ACTIVE | COMMUNITY

## 2019-11-08 NOTE — CONSULT LETTER
[Dear  ___] : Dear  [unfilled], [DrEliseo  ___] : Dr. MADRIGAL [Consult Letter:] : I had the pleasure of evaluating your patient, [unfilled]. [Consult Closing:] : Thank you very much for allowing me to participate in the care of this patient.  If you have any questions, please do not hesitate to contact me. [Sincerely,] : Sincerely, [Please see my note below.] : Please see my note below. [DrEliseo ___] : Dr. MADRIGAL [FreeTextEntry3] : Cleve Saldivar MD\par Surgical Oncology\par [facility]\par Office: [phone]\par Cell: [phone]\par  [FreeTextEntry2] : Mandeep Conley MD [FreeTextEntry1] : Alphonse is a pleasant 79 year-old male here for an initial consultation.  He experienced an UTI and was referred for an ultrasound per his urologist with suggestion of a mass.  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  Mesenteric vessels are seen around the mass.  Suggested differentials include carcinoid, lymphoma or neurogenic tumor.  A chest CT on 10/24/19 showed no evidence of metastatic disease.  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  Additional stains are pending to determine specific subtype. \par \par Dr. Lozada performed an EGD and colonoscopy on  May 2017 with benign findings including 3 colonic polyps. \par \par His past medical history includes DM II, hypertension, hyperlipidemia, DDD, BPH and hypothyroidism.  Last year he was diagnosed with a liver abscess which was drained at [facility] and treated with antibiotics (patient was worked up by ID, ultimately felt to be secondary to food borne illness).   Past surgical history includes a partial thyroidectomy in 1999 for a benign condition, and back surgery for spinal stenosis (hardware in place).  His records indicate that he suffered a subarachnoid hemorrhage, but patient states he had slipped and fallen in 2014 and a CT of his brain showed a miniscule bleed.  He denies any loss of consciousness at that time, but states that as part of syncope work up, cardiology implanted a cardiac monitoring device which was ultimately removed with no abnormal findings.   He takes baby aspirin daily for prophylaxis but has stopped recently in anticipation of surgical procedure.   He denies any family history of malignancies.  He denies alcohol or tobacco use. \par \par He reports insomnia possibly related to anxiety related to his diagnosis, but is feeling himself otherwise and denies any GI or constitutional symptoms. \par \par On exam, there are no palpable abdominal masses. There are post biopsy changes involving the anterior abdominal wall in the right lower quadrant.\par \par We discussed the plan for a robotic laparoscopic possible open resection of the intra-abdominal sarcoma.  We discussed  the associated risks, benefits, and alternatives of the procedure. We also discussed potential complications and postoperative expectations. He and his family expressed understanding and agree to proceed.

## 2019-11-08 NOTE — PHYSICAL EXAM
[Normal] : supple, no neck mass and thyroid not enlarged [Normal Supraclavicular Lymph Nodes] : normal supraclavicular lymph nodes [Normal Neck Lymph Nodes] : normal neck lymph nodes  [Normal Axillary Lymph Nodes] : normal axillary lymph nodes [Normal Groin Lymph Nodes] : normal groin lymph nodes [Normal] : grossly intact [FreeTextEntry1] : I have reviewed pertinent imaging, pathology and bloodwork.\par  [de-identified] : post bx changes rlq abd wall

## 2019-11-08 NOTE — HISTORY OF PRESENT ILLNESS
[de-identified] : Alphonse is a pleasant 79 year-old male here for an initial consultation.  He experienced an UTI and was referred for an ultrasound per his urologist with suggestion of a mass.  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  Mesenteric vessels are seen around the mass.  Suggested differentials include carcinoid, lymphoma or neurogenic tumor.  A chest CT on 10/24/19 showed no evidence of metastatic disease.  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  Additional stains are pending to determine specific subtype. \par \par Dr. Lozada performed an EGD and colonoscopy on  May 2017 with benign findings including 3 colonic polyps. \par \par His past medical history includes DM II, hypertension, hyperlipidemia, DDD, BPH and hypothyroidism.  Last year he was diagnosed with a liver abscess which was drained at [facility] and treated with antibiotics (patient was worked up by ID, ultimately felt to be secondary to food borne illness).   Past surgical history includes a partial thyroidectomy in 1999 for a benign condition, and back surgery for spinal stenosis (hardware in place).  His records indicate that he suffered a subarachnoid hemorrhage, but patient states he had slipped and fallen in 2014 and a CT of his brain showed a miniscule bleed.  He denies any loss of consciousness at that time, but states that as part of syncope work up, cardiology implanted a cardiac monitoring device which was ultimately removed with no abnormal findings.   He takes baby aspirin daily for prophylaxis but has stopped recently in anticipation of surgical procedure.   He denies any family history of malignancies.  He denies alcohol or tobacco use. \par \par He reports insomnia possibly related to anxiety related to his diagnosis, but is feeling himself otherwise and denies any GI or constitutional symptoms.

## 2019-11-08 NOTE — ASSESSMENT
[FreeTextEntry1] : We discussed the plan for a robotic laparoscopic possible open resection of the intra-abdominal sarcoma.  We discussed  the associated risks, benefits, and alternatives of the procedure. We also discussed potential complications and postoperative expectations. He and his family expressed understanding and agree to proceed.

## 2019-11-13 RX ORDER — POLYETHYLENE GLYCOL 3350, SODIUM SULFATE, SODIUM CHLORIDE, POTASSIUM CHLORIDE, ASCORBIC ACID, SODIUM ASCORBATE 7.5-2.691G
100 KIT ORAL
Qty: 1 | Refills: 0 | Status: ACTIVE | COMMUNITY
Start: 2019-11-13 | End: 1900-01-01

## 2019-11-13 RX ORDER — POTASSIUM CHLORIDE 1500 MG/1
20 TABLET, FILM COATED, EXTENDED RELEASE ORAL AS DIRECTED
Qty: 4 | Refills: 0 | Status: ACTIVE | COMMUNITY
Start: 2019-11-13 | End: 1900-01-01

## 2019-11-15 ENCOUNTER — RESULT REVIEW (OUTPATIENT)
Age: 79
End: 2019-11-15

## 2019-11-15 ENCOUNTER — OUTPATIENT (OUTPATIENT)
Dept: OUTPATIENT SERVICES | Facility: HOSPITAL | Age: 79
LOS: 1 days | End: 2019-11-15
Payer: COMMERCIAL

## 2019-11-15 DIAGNOSIS — K22.70 BARRETT'S ESOPHAGUS WITHOUT DYSPLASIA: ICD-10-CM

## 2019-11-15 DIAGNOSIS — R19.03 RIGHT LOWER QUADRANT ABDOMINAL SWELLING, MASS AND LUMP: ICD-10-CM

## 2019-11-15 DIAGNOSIS — E89.0 POSTPROCEDURAL HYPOTHYROIDISM: Chronic | ICD-10-CM

## 2019-11-15 LAB — GLUCOSE BLDC GLUCOMTR-MCNC: 95 MG/DL — SIGNIFICANT CHANGE UP (ref 70–99)

## 2019-11-15 PROCEDURE — 82962 GLUCOSE BLOOD TEST: CPT

## 2019-11-15 PROCEDURE — G0105: CPT

## 2019-11-15 PROCEDURE — 43239 EGD BIOPSY SINGLE/MULTIPLE: CPT

## 2019-11-15 PROCEDURE — 88305 TISSUE EXAM BY PATHOLOGIST: CPT

## 2019-11-15 PROCEDURE — 88305 TISSUE EXAM BY PATHOLOGIST: CPT | Mod: 26

## 2019-11-19 LAB — SURGICAL PATHOLOGY STUDY: SIGNIFICANT CHANGE UP

## 2019-11-29 ENCOUNTER — OUTPATIENT (OUTPATIENT)
Dept: OUTPATIENT SERVICES | Facility: HOSPITAL | Age: 79
LOS: 1 days | End: 2019-11-29
Payer: COMMERCIAL

## 2019-11-29 VITALS
HEART RATE: 71 BPM | OXYGEN SATURATION: 97 % | WEIGHT: 197.98 LBS | DIASTOLIC BLOOD PRESSURE: 76 MMHG | RESPIRATION RATE: 16 BRPM | SYSTOLIC BLOOD PRESSURE: 146 MMHG | TEMPERATURE: 98 F | HEIGHT: 74 IN

## 2019-11-29 DIAGNOSIS — Z01.818 ENCOUNTER FOR OTHER PREPROCEDURAL EXAMINATION: ICD-10-CM

## 2019-11-29 DIAGNOSIS — E89.0 POSTPROCEDURAL HYPOTHYROIDISM: Chronic | ICD-10-CM

## 2019-11-29 DIAGNOSIS — C48.1 MALIGNANT NEOPLASM OF SPECIFIED PARTS OF PERITONEUM: ICD-10-CM

## 2019-11-29 DIAGNOSIS — Z98.890 OTHER SPECIFIED POSTPROCEDURAL STATES: Chronic | ICD-10-CM

## 2019-11-29 DIAGNOSIS — E03.9 HYPOTHYROIDISM, UNSPECIFIED: ICD-10-CM

## 2019-11-29 DIAGNOSIS — R19.00 INTRA-ABDOMINAL AND PELVIC SWELLING, MASS AND LUMP, UNSPECIFIED SITE: ICD-10-CM

## 2019-11-29 LAB
ALBUMIN SERPL ELPH-MCNC: 4.4 G/DL — SIGNIFICANT CHANGE UP (ref 3.3–5)
ALP SERPL-CCNC: 72 U/L — SIGNIFICANT CHANGE UP (ref 40–120)
ALT FLD-CCNC: 12 U/L — SIGNIFICANT CHANGE UP (ref 4–41)
ANION GAP SERPL CALC-SCNC: 10 MMO/L — SIGNIFICANT CHANGE UP (ref 7–14)
AST SERPL-CCNC: 19 U/L — SIGNIFICANT CHANGE UP (ref 4–40)
BILIRUB SERPL-MCNC: 0.4 MG/DL — SIGNIFICANT CHANGE UP (ref 0.2–1.2)
BLD GP AB SCN SERPL QL: NEGATIVE — SIGNIFICANT CHANGE UP
BUN SERPL-MCNC: 20 MG/DL — SIGNIFICANT CHANGE UP (ref 7–23)
CALCIUM SERPL-MCNC: 9.4 MG/DL — SIGNIFICANT CHANGE UP (ref 8.4–10.5)
CHLORIDE SERPL-SCNC: 99 MMOL/L — SIGNIFICANT CHANGE UP (ref 98–107)
CO2 SERPL-SCNC: 29 MMOL/L — SIGNIFICANT CHANGE UP (ref 22–31)
CREAT SERPL-MCNC: 0.82 MG/DL — SIGNIFICANT CHANGE UP (ref 0.5–1.3)
GLUCOSE SERPL-MCNC: 85 MG/DL — SIGNIFICANT CHANGE UP (ref 70–99)
HBA1C BLD-MCNC: 6.2 % — HIGH (ref 4–5.6)
HCT VFR BLD CALC: 42.9 % — SIGNIFICANT CHANGE UP (ref 39–50)
HGB BLD-MCNC: 13.4 G/DL — SIGNIFICANT CHANGE UP (ref 13–17)
MCHC RBC-ENTMCNC: 27.2 PG — SIGNIFICANT CHANGE UP (ref 27–34)
MCHC RBC-ENTMCNC: 31.2 % — LOW (ref 32–36)
MCV RBC AUTO: 87.2 FL — SIGNIFICANT CHANGE UP (ref 80–100)
NRBC # FLD: 0 K/UL — SIGNIFICANT CHANGE UP (ref 0–0)
PLATELET # BLD AUTO: 186 K/UL — SIGNIFICANT CHANGE UP (ref 150–400)
PMV BLD: 10.5 FL — SIGNIFICANT CHANGE UP (ref 7–13)
POTASSIUM SERPL-MCNC: 4 MMOL/L — SIGNIFICANT CHANGE UP (ref 3.5–5.3)
POTASSIUM SERPL-SCNC: 4 MMOL/L — SIGNIFICANT CHANGE UP (ref 3.5–5.3)
PROT SERPL-MCNC: 7.2 G/DL — SIGNIFICANT CHANGE UP (ref 6–8.3)
RBC # BLD: 4.92 M/UL — SIGNIFICANT CHANGE UP (ref 4.2–5.8)
RBC # FLD: 14.1 % — SIGNIFICANT CHANGE UP (ref 10.3–14.5)
RH IG SCN BLD-IMP: NEGATIVE — SIGNIFICANT CHANGE UP
SODIUM SERPL-SCNC: 138 MMOL/L — SIGNIFICANT CHANGE UP (ref 135–145)
WBC # BLD: 7.31 K/UL — SIGNIFICANT CHANGE UP (ref 3.8–10.5)
WBC # FLD AUTO: 7.31 K/UL — SIGNIFICANT CHANGE UP (ref 3.8–10.5)

## 2019-11-29 PROCEDURE — 93010 ELECTROCARDIOGRAM REPORT: CPT

## 2019-11-29 RX ORDER — OMEGA-3 ACID ETHYL ESTERS 1 G
1 CAPSULE ORAL
Qty: 0 | Refills: 0 | DISCHARGE

## 2019-11-29 RX ORDER — MULTIVIT-MIN/FERROUS GLUCONATE 9 MG/15 ML
1 LIQUID (ML) ORAL
Qty: 0 | Refills: 0 | DISCHARGE

## 2019-11-29 NOTE — H&P PST ADULT - ALLERGIC/IMMUNOLOGIC
Plan: Briefly discussed retinoid treatment with patient. Patient states she did not like the retinoid, declines at this time. Patient states she does not need prescription refill of the Clindamycin gel at this time. Discussed she can call the office for prescription refill when she needs this Continue Regimen: Clindamycin 1% gel PRN to spot treat breakouts on face QD Detail Level: Zone negative

## 2019-11-29 NOTE — H&P PST ADULT - HISTORY OF PRESENT ILLNESS
Pt is a 79 yr old male scheduled for Robotic Laparoscopic Poss Open REsection of Abdominal Mass with Dr Saldivar 12/11/19. Pt found to have peritoneal mass during w/u for UTI - biopsy positive for Ca. Pt denies pain or GI symptoms. Hx of spinal surgery with hardware 2016.

## 2019-11-29 NOTE — H&P PST ADULT - ENDOCRINE COMMENTS
Hx thyroid surgery and DM -  and thyroid supplement is stable Hx Hypothyroid and stable on current dose - Hx of DM - on oral meds - 's

## 2019-11-29 NOTE — H&P PST ADULT - NSICDXFAMILYHX_GEN_ALL_CORE_FT
FAMILY HISTORY:  Family history of heart disease    Father  Still living? Unknown  Family history of hypertension, Age at diagnosis: Age Unknown

## 2019-11-29 NOTE — H&P PST ADULT - ABILITY TO HEAR (WITH HEARING AID OR HEARING APPLIANCE IF NORMALLY USED):
Mildly to Moderately Impaired: difficulty hearing in some environments or speaker may need to increase volume or speak distinctly hearing aids/Mildly to Moderately Impaired: difficulty hearing in some environments or speaker may need to increase volume or speak distinctly

## 2019-11-29 NOTE — H&P PST ADULT - NSICDXPASTMEDICALHX_GEN_ALL_CORE_FT
PAST MEDICAL HISTORY:  Abdominal mass     BPH (benign prostatic hypertrophy)     Diabetes     GERD (gastroesophageal reflux disease)     Hypertension     Hypothyroid     Overactive bladder     Spinal stenosis     Thyroid nodule

## 2019-11-29 NOTE — H&P PST ADULT - NSANTHOSAYNRD_GEN_A_CORE
No. ESCOBAR screening performed.  STOP BANG Legend: 0-2 = LOW Risk; 3-4 = INTERMEDIATE Risk; 5-8 = HIGH Risk

## 2019-11-29 NOTE — H&P PST ADULT - NSICDXPROBLEM_GEN_ALL_CORE_FT
PROBLEM DIAGNOSES  Problem: Abdominal mass  Assessment and Plan: Pt scheduled for surgery and preop instructions including instructions for taking Famotidine and for Chlorhexidine use in showering on the day of surgery, given verbally and with use of  written materials, and patient confirming understanding of such instructions using  teach back   method.  OR Booking notified of risk for sleep apnea, hearing loss and DM   Pt to see PCP for [facility] - forms given     Problem: Hypothyroid  Assessment and Plan: Pt to take Synthroid am DOS

## 2019-11-29 NOTE — H&P PST ADULT - LOCATION OF BACK PAIN
lumbar spine/Hx of spinal surgery lower back and hardware - pt c/o of occasional pain with certain movements

## 2019-11-29 NOTE — H&P PST ADULT - NEUROLOGICAL DETAILS
responds to verbal commands/normal strength/responds to pain/sensation intact/alert and oriented x 3

## 2019-12-10 ENCOUNTER — TRANSCRIPTION ENCOUNTER (OUTPATIENT)
Age: 79
End: 2019-12-10

## 2019-12-10 NOTE — ASU PATIENT PROFILE, ADULT - PMH
Abdominal mass    BPH (benign prostatic hypertrophy)    Diabetes    GERD (gastroesophageal reflux disease)    Hypertension    Hypothyroid    Overactive bladder    Spinal stenosis    Thyroid nodule

## 2019-12-11 ENCOUNTER — RESULT REVIEW (OUTPATIENT)
Age: 79
End: 2019-12-11

## 2019-12-11 ENCOUNTER — INPATIENT (INPATIENT)
Facility: HOSPITAL | Age: 79
LOS: 2 days | Discharge: HOME | End: 2019-12-14
Attending: SURGERY | Admitting: SURGERY
Payer: COMMERCIAL

## 2019-12-11 ENCOUNTER — APPOINTMENT (OUTPATIENT)
Dept: SURGICAL ONCOLOGY | Facility: HOSPITAL | Age: 79
End: 2019-12-11

## 2019-12-11 VITALS
RESPIRATION RATE: 16 BRPM | SYSTOLIC BLOOD PRESSURE: 128 MMHG | TEMPERATURE: 98 F | HEIGHT: 74 IN | HEART RATE: 71 BPM | OXYGEN SATURATION: 98 % | DIASTOLIC BLOOD PRESSURE: 70 MMHG | WEIGHT: 197.98 LBS

## 2019-12-11 DIAGNOSIS — Z98.890 OTHER SPECIFIED POSTPROCEDURAL STATES: Chronic | ICD-10-CM

## 2019-12-11 DIAGNOSIS — E89.0 POSTPROCEDURAL HYPOTHYROIDISM: Chronic | ICD-10-CM

## 2019-12-11 DIAGNOSIS — C48.1 MALIGNANT NEOPLASM OF SPECIFIED PARTS OF PERITONEUM: ICD-10-CM

## 2019-12-11 LAB
GLUCOSE BLDC GLUCOMTR-MCNC: 106 MG/DL — HIGH (ref 70–99)
GLUCOSE BLDC GLUCOMTR-MCNC: 121 MG/DL — HIGH (ref 70–99)
GLUCOSE BLDC GLUCOMTR-MCNC: 95 MG/DL — SIGNIFICANT CHANGE UP (ref 70–99)
GLUCOSE BLDC GLUCOMTR-MCNC: 98 MG/DL — SIGNIFICANT CHANGE UP (ref 70–99)

## 2019-12-11 PROCEDURE — 88342 IMHCHEM/IMCYTCHM 1ST ANTB: CPT | Mod: 26,59

## 2019-12-11 PROCEDURE — 49320 DIAG LAPARO SEPARATE PROC: CPT | Mod: 59

## 2019-12-11 PROCEDURE — 88304 TISSUE EXAM BY PATHOLOGIST: CPT | Mod: 26

## 2019-12-11 PROCEDURE — 88302 TISSUE EXAM BY PATHOLOGIST: CPT | Mod: 26

## 2019-12-11 PROCEDURE — 88313 SPECIAL STAINS GROUP 2: CPT | Mod: 26

## 2019-12-11 PROCEDURE — 38747 REMOVE ABDOMINAL LYMPH NODES: CPT

## 2019-12-11 PROCEDURE — 88309 TISSUE EXAM BY PATHOLOGIST: CPT | Mod: 26

## 2019-12-11 PROCEDURE — 47100 WEDGE BIOPSY OF LIVER: CPT

## 2019-12-11 PROCEDURE — 44120 REMOVAL OF SMALL INTESTINE: CPT

## 2019-12-11 PROCEDURE — 49585: CPT | Mod: 59

## 2019-12-11 PROCEDURE — 49204: CPT

## 2019-12-11 PROCEDURE — 88341 IMHCHEM/IMCYTCHM EA ADD ANTB: CPT | Mod: 26

## 2019-12-11 PROCEDURE — 88307 TISSUE EXAM BY PATHOLOGIST: CPT | Mod: 26

## 2019-12-11 RX ORDER — FINASTERIDE 5 MG/1
1 TABLET, FILM COATED ORAL
Qty: 0 | Refills: 0 | DISCHARGE

## 2019-12-11 RX ORDER — OMEPRAZOLE 10 MG/1
1 CAPSULE, DELAYED RELEASE ORAL
Qty: 0 | Refills: 0 | DISCHARGE

## 2019-12-11 RX ORDER — CINNAMON BARK 500 MG
1000 CAPSULE ORAL
Qty: 0 | Refills: 0 | DISCHARGE

## 2019-12-11 RX ORDER — ATORVASTATIN CALCIUM 80 MG/1
10 TABLET, FILM COATED ORAL AT BEDTIME
Refills: 0 | Status: DISCONTINUED | OUTPATIENT
Start: 2019-12-11 | End: 2019-12-14

## 2019-12-11 RX ORDER — ACETAMINOPHEN 500 MG
1000 TABLET ORAL ONCE
Refills: 0 | Status: COMPLETED | OUTPATIENT
Start: 2019-12-11 | End: 2019-12-11

## 2019-12-11 RX ORDER — LOVASTATIN 20 MG
1 TABLET ORAL
Qty: 0 | Refills: 0 | DISCHARGE

## 2019-12-11 RX ORDER — LOSARTAN POTASSIUM 100 MG/1
1 TABLET, FILM COATED ORAL
Qty: 0 | Refills: 0 | DISCHARGE

## 2019-12-11 RX ORDER — HYDROMORPHONE HYDROCHLORIDE 2 MG/ML
1 INJECTION INTRAMUSCULAR; INTRAVENOUS; SUBCUTANEOUS
Refills: 0 | Status: DISCONTINUED | OUTPATIENT
Start: 2019-12-11 | End: 2019-12-11

## 2019-12-11 RX ORDER — DEXTROSE 50 % IN WATER 50 %
25 SYRINGE (ML) INTRAVENOUS ONCE
Refills: 0 | Status: DISCONTINUED | OUTPATIENT
Start: 2019-12-11 | End: 2019-12-14

## 2019-12-11 RX ORDER — LEVOTHYROXINE SODIUM 125 MCG
125 TABLET ORAL DAILY
Refills: 0 | Status: DISCONTINUED | OUTPATIENT
Start: 2019-12-11 | End: 2019-12-14

## 2019-12-11 RX ORDER — TAMSULOSIN HYDROCHLORIDE 0.4 MG/1
1 CAPSULE ORAL
Qty: 0 | Refills: 0 | DISCHARGE

## 2019-12-11 RX ORDER — METFORMIN HYDROCHLORIDE 850 MG/1
1 TABLET ORAL
Qty: 0 | Refills: 0 | DISCHARGE

## 2019-12-11 RX ORDER — SODIUM CHLORIDE 9 MG/ML
1000 INJECTION, SOLUTION INTRAVENOUS
Refills: 0 | Status: DISCONTINUED | OUTPATIENT
Start: 2019-12-11 | End: 2019-12-12

## 2019-12-11 RX ORDER — DEXTROSE 50 % IN WATER 50 %
15 SYRINGE (ML) INTRAVENOUS ONCE
Refills: 0 | Status: DISCONTINUED | OUTPATIENT
Start: 2019-12-11 | End: 2019-12-14

## 2019-12-11 RX ORDER — ONDANSETRON 8 MG/1
4 TABLET, FILM COATED ORAL ONCE
Refills: 0 | Status: DISCONTINUED | OUTPATIENT
Start: 2019-12-11 | End: 2019-12-11

## 2019-12-11 RX ORDER — ENOXAPARIN SODIUM 100 MG/ML
40 INJECTION SUBCUTANEOUS EVERY 24 HOURS
Refills: 0 | Status: DISCONTINUED | OUTPATIENT
Start: 2019-12-11 | End: 2019-12-14

## 2019-12-11 RX ORDER — LEVOTHYROXINE SODIUM 125 MCG
1 TABLET ORAL
Qty: 0 | Refills: 0 | DISCHARGE

## 2019-12-11 RX ORDER — DEXTROSE 50 % IN WATER 50 %
12.5 SYRINGE (ML) INTRAVENOUS ONCE
Refills: 0 | Status: DISCONTINUED | OUTPATIENT
Start: 2019-12-11 | End: 2019-12-14

## 2019-12-11 RX ORDER — HYDROMORPHONE HYDROCHLORIDE 2 MG/ML
0.5 INJECTION INTRAMUSCULAR; INTRAVENOUS; SUBCUTANEOUS
Refills: 0 | Status: DISCONTINUED | OUTPATIENT
Start: 2019-12-11 | End: 2019-12-11

## 2019-12-11 RX ORDER — ACETAMINOPHEN 500 MG
1000 TABLET ORAL ONCE
Refills: 0 | Status: COMPLETED | OUTPATIENT
Start: 2019-12-12 | End: 2019-12-12

## 2019-12-11 RX ORDER — INFLUENZA VIRUS VACCINE 15; 15; 15; 15 UG/.5ML; UG/.5ML; UG/.5ML; UG/.5ML
0.5 SUSPENSION INTRAMUSCULAR ONCE
Refills: 0 | Status: DISCONTINUED | OUTPATIENT
Start: 2019-12-11 | End: 2019-12-14

## 2019-12-11 RX ORDER — GLUCAGON INJECTION, SOLUTION 0.5 MG/.1ML
1 INJECTION, SOLUTION SUBCUTANEOUS ONCE
Refills: 0 | Status: DISCONTINUED | OUTPATIENT
Start: 2019-12-11 | End: 2019-12-14

## 2019-12-11 RX ORDER — PANTOPRAZOLE SODIUM 20 MG/1
40 TABLET, DELAYED RELEASE ORAL
Refills: 0 | Status: DISCONTINUED | OUTPATIENT
Start: 2019-12-11 | End: 2019-12-14

## 2019-12-11 RX ORDER — KETOROLAC TROMETHAMINE 30 MG/ML
15 SYRINGE (ML) INJECTION EVERY 8 HOURS
Refills: 0 | Status: DISCONTINUED | OUTPATIENT
Start: 2019-12-11 | End: 2019-12-13

## 2019-12-11 RX ORDER — FINASTERIDE 5 MG/1
5 TABLET, FILM COATED ORAL DAILY
Refills: 0 | Status: DISCONTINUED | OUTPATIENT
Start: 2019-12-11 | End: 2019-12-14

## 2019-12-11 RX ORDER — MIRABEGRON 50 MG/1
1 TABLET, EXTENDED RELEASE ORAL
Qty: 0 | Refills: 0 | DISCHARGE

## 2019-12-11 RX ORDER — INSULIN LISPRO 100/ML
VIAL (ML) SUBCUTANEOUS EVERY 6 HOURS
Refills: 0 | Status: DISCONTINUED | OUTPATIENT
Start: 2019-12-11 | End: 2019-12-13

## 2019-12-11 RX ORDER — MULTIVIT-MIN/FERROUS GLUCONATE 9 MG/15 ML
1 LIQUID (ML) ORAL
Qty: 0 | Refills: 0 | DISCHARGE

## 2019-12-11 RX ORDER — SODIUM CHLORIDE 9 MG/ML
1000 INJECTION, SOLUTION INTRAVENOUS ONCE
Refills: 0 | Status: COMPLETED | OUTPATIENT
Start: 2019-12-11 | End: 2019-12-11

## 2019-12-11 RX ORDER — LOSARTAN POTASSIUM 100 MG/1
50 TABLET, FILM COATED ORAL DAILY
Refills: 0 | Status: DISCONTINUED | OUTPATIENT
Start: 2019-12-11 | End: 2019-12-14

## 2019-12-11 RX ORDER — TAMSULOSIN HYDROCHLORIDE 0.4 MG/1
0.4 CAPSULE ORAL AT BEDTIME
Refills: 0 | Status: DISCONTINUED | OUTPATIENT
Start: 2019-12-11 | End: 2019-12-14

## 2019-12-11 RX ORDER — FENTANYL CITRATE 50 UG/ML
50 INJECTION INTRAVENOUS
Refills: 0 | Status: DISCONTINUED | OUTPATIENT
Start: 2019-12-11 | End: 2019-12-11

## 2019-12-11 RX ORDER — SODIUM CHLORIDE 9 MG/ML
1000 INJECTION, SOLUTION INTRAVENOUS
Refills: 0 | Status: DISCONTINUED | OUTPATIENT
Start: 2019-12-11 | End: 2019-12-11

## 2019-12-11 RX ORDER — OMEGA-3 ACID ETHYL ESTERS 1 G
1 CAPSULE ORAL
Qty: 0 | Refills: 0 | DISCHARGE

## 2019-12-11 RX ADMIN — Medication 15 MILLIGRAM(S): at 19:53

## 2019-12-11 RX ADMIN — ATORVASTATIN CALCIUM 10 MILLIGRAM(S): 80 TABLET, FILM COATED ORAL at 22:32

## 2019-12-11 RX ADMIN — SODIUM CHLORIDE 2000 MILLILITER(S): 9 INJECTION, SOLUTION INTRAVENOUS at 17:00

## 2019-12-11 RX ADMIN — Medication 1000 MILLIGRAM(S): at 16:15

## 2019-12-11 RX ADMIN — Medication 15 MILLIGRAM(S): at 20:45

## 2019-12-11 RX ADMIN — SODIUM CHLORIDE 30 MILLILITER(S): 9 INJECTION, SOLUTION INTRAVENOUS at 07:09

## 2019-12-11 RX ADMIN — Medication 400 MILLIGRAM(S): at 22:33

## 2019-12-11 RX ADMIN — FINASTERIDE 5 MILLIGRAM(S): 5 TABLET, FILM COATED ORAL at 22:32

## 2019-12-11 RX ADMIN — ENOXAPARIN SODIUM 40 MILLIGRAM(S): 100 INJECTION SUBCUTANEOUS at 22:32

## 2019-12-11 RX ADMIN — TAMSULOSIN HYDROCHLORIDE 0.4 MILLIGRAM(S): 0.4 CAPSULE ORAL at 22:32

## 2019-12-11 RX ADMIN — Medication 400 MILLIGRAM(S): at 15:48

## 2019-12-11 NOTE — PATIENT PROFILE ADULT - ARRIVAL FROM
Component      Latest Ref Rng & Units 1/8/2019   Fasting Status      hrs 12   Sodium      135 - 145 mmol/L 140   Potassium      3.4 - 5.1 mmol/L 4.1   Chloride      98 - 107 mmol/L 104   CO2      21 - 32 mmol/L 26   ANION GAP      10 - 20 mmol/L 14   Glucose      65 - 99 mg/dL 85   BUN      6 - 20 mg/dL 10   Creatinine      0.51 - 0.95 mg/dL 0.78   GFR Estimate,        90   GFR Estimate, Non African American       78   BUN/CREATININE RATIO      7 - 25 13   CALCIUM      8.4 - 10.2 mg/dL 9.4   TOTAL BILIRUBIN      0.2 - 1.0 mg/dL 0.7   AST/SGOT      <38 Units/L 19   ALT/SGPT      <79 Units/L 28   ALK PHOSPHATASE      45 - 117 Units/L 76   TOTAL PROTEIN      6.4 - 8.2 g/dL 6.9   Albumin      3.6 - 5.1 g/dL 4.0   GLOBULIN      2.0 - 4.0 g/dL 2.9   A/G Ratio, Serum      1.0 - 2.4 1.4   FASTING STATUS      hrs 12   CHOLESTEROL      <200 mg/dL 170   CALCULATED LDL      <130 mg/dL 88   HDL      >49 mg/dL 57   TRIGLYCERIDE      <150 mg/dL 126   CALCULATED NON HDL      mg/dL 113   CHOL/HDL      <4.5 3.0   Shingles vaccine at [facility]   
Home

## 2019-12-11 NOTE — PATIENT PROFILE ADULT - ARE ANY OF THE ITEMS ON THE CHART
10/3/2019     Micki Thorpe  [address] [address] [facility] [address]      Dear Micki:    Thank you for allowing me to participate in your care. Your recent test results were reviewed and listed below.      Max has Received a Flu Vaccine today.     Thank you for choosing [facility]. As a result, please continue with the treatment plan discussed in the office. Return as discussed or sooner if symptoms worsens or fail to improve. If you have any further questions or concerns, please do not hesitate to contact us.      Sincerely,    Alla Serrano MD  [address] [address]  Phone: [phone]      no

## 2019-12-11 NOTE — PATIENT PROFILE ADULT - ABILITY TO HEAR (WITH HEARING AID OR HEARING APPLIANCE IF NORMALLY USED):
Mildly to Moderately Impaired: difficulty hearing in some environments or speaker may need to increase volume or speak distinctly/uses b/l hearing aides

## 2019-12-11 NOTE — BRIEF OPERATIVE NOTE - SPECIMENS
1- liver biopsy; 2- umbilical hernia sac; 3- mesenteric mass (stitch marks apex); 4- jejunum (stitch marks proximal); 5- portion of small bowel anastomosis

## 2019-12-11 NOTE — BRIEF OPERATIVE NOTE - OPERATION/FINDINGS
~8 cm mass, previously identified as sarcoma, in mesentery of distal jejunum. Mass resected from mesentery, with small bowel. Side to side stapled anastomosis created, with large patent lumen. Mesenteric defect closed with running silk suture. Suspicious mass from liver biopsied laparoscopically.

## 2019-12-11 NOTE — CHART NOTE - NSCHARTNOTEFT_GEN_A_CORE
POST-OPERATIVE NOTE    Subjective:  Patient is s/p small bowel resection for sarcoma, liver biopsy and umbilical hernia repair. Had bilateral blocks in the OR. Recovering appropriately.     Vital Signs Last 24 Hrs  T(C): 36.7 (11 Dec 2019 14:00), Max: 36.7 (11 Dec 2019 14:00)  T(F): 98.1 (11 Dec 2019 14:00), Max: 98.1 (11 Dec 2019 14:00)  HR: 79 (11 Dec 2019 16:00) (69 - 83)  BP: 121/62 (11 Dec 2019 16:00) (104/56 - 128/70)  BP(mean): 76 (11 Dec 2019 16:00) (65 - 76)  RR: 21 (11 Dec 2019 16:00) (11 - 21)  SpO2: 99% (11 Dec 2019 16:00) (94% - 99%)  I&O's Detail    11 Dec 2019 07:01  -  11 Dec 2019 17:40  --------------------------------------------------------  IN:    Lactated Ringers IV Bolus: 1000 mL    lactated ringers.: 850 mL  Total IN: 1850 mL    OUT:    Indwelling Catheter - Urethral: 292 mL  Total OUT: 292 mL    Total NET: 1558 mL        enoxaparin Injectable 40  losartan 50  tamsulosin 0.4    PAST MEDICAL & SURGICAL HISTORY:  Abdominal mass  Hypothyroid  Spinal stenosis  BPH (benign prostatic hypertrophy)  Overactive bladder  GERD (gastroesophageal reflux disease)  Hypertension  Thyroid nodule  Diabetes  H/O Spinal surgery: 2016 - with hardware  H/O partial thyroidectomy        Physical Exam:  General: NAD, resting comfortably in bed  Pulmonary: Nonlabored breathing, no respiratory distress  Cardiovascular: NSR  Abdominal: soft, mildly distended, nontender, incisions clean dry and intact   Extremities: WWP      LABS:        CAPILLARY BLOOD GLUCOSE      POCT Blood Glucose.: 121 mg/dL (11 Dec 2019 11:59)  POCT Blood Glucose.: 95 mg/dL (11 Dec 2019 06:28)      Radiology and Additional Studies:    Assessment:  The patient is a 79y Male who is now several hours post-op from a small bowel resection and anastomosis, liver biopsy and umbilical hernia repair.     Plan:  - Pain control as needed  - DVT ppx  - OOB and ambulating as tolerated  - F/u AM labs

## 2019-12-11 NOTE — BRIEF OPERATIVE NOTE - NSICDXBRIEFPROCEDURE_GEN_ALL_CORE_FT
PROCEDURES:  Repair, hernia, umbilical, open, adult 11-Dec-2019 10:51:08  Racquel Reynolds  Small bowel resection with anastomosis 11-Dec-2019 10:51:00  Racquel Reynolds  Laparoscopic liver biopsy 11-Dec-2019 10:50:51  Racquel Reynolds

## 2019-12-12 LAB
ANION GAP SERPL CALC-SCNC: 13 MMO/L — SIGNIFICANT CHANGE UP (ref 7–14)
BUN SERPL-MCNC: 15 MG/DL — SIGNIFICANT CHANGE UP (ref 7–23)
CALCIUM SERPL-MCNC: 8.5 MG/DL — SIGNIFICANT CHANGE UP (ref 8.4–10.5)
CHLORIDE SERPL-SCNC: 100 MMOL/L — SIGNIFICANT CHANGE UP (ref 98–107)
CO2 SERPL-SCNC: 23 MMOL/L — SIGNIFICANT CHANGE UP (ref 22–31)
CREAT SERPL-MCNC: 0.78 MG/DL — SIGNIFICANT CHANGE UP (ref 0.5–1.3)
GLUCOSE BLDC GLUCOMTR-MCNC: 118 MG/DL — HIGH (ref 70–99)
GLUCOSE BLDC GLUCOMTR-MCNC: 134 MG/DL — HIGH (ref 70–99)
GLUCOSE BLDC GLUCOMTR-MCNC: 86 MG/DL — SIGNIFICANT CHANGE UP (ref 70–99)
GLUCOSE SERPL-MCNC: 83 MG/DL — SIGNIFICANT CHANGE UP (ref 70–99)
HCT VFR BLD CALC: 37.5 % — LOW (ref 39–50)
HGB BLD-MCNC: 12 G/DL — LOW (ref 13–17)
MAGNESIUM SERPL-MCNC: 1.7 MG/DL — SIGNIFICANT CHANGE UP (ref 1.6–2.6)
MCHC RBC-ENTMCNC: 28 PG — SIGNIFICANT CHANGE UP (ref 27–34)
MCHC RBC-ENTMCNC: 32 % — SIGNIFICANT CHANGE UP (ref 32–36)
MCV RBC AUTO: 87.4 FL — SIGNIFICANT CHANGE UP (ref 80–100)
NRBC # FLD: 0 K/UL — SIGNIFICANT CHANGE UP (ref 0–0)
PHOSPHATE SERPL-MCNC: 3 MG/DL — SIGNIFICANT CHANGE UP (ref 2.5–4.5)
PLATELET # BLD AUTO: 183 K/UL — SIGNIFICANT CHANGE UP (ref 150–400)
PMV BLD: 10.8 FL — SIGNIFICANT CHANGE UP (ref 7–13)
POTASSIUM SERPL-MCNC: 3.9 MMOL/L — SIGNIFICANT CHANGE UP (ref 3.5–5.3)
POTASSIUM SERPL-SCNC: 3.9 MMOL/L — SIGNIFICANT CHANGE UP (ref 3.5–5.3)
RBC # BLD: 4.29 M/UL — SIGNIFICANT CHANGE UP (ref 4.2–5.8)
RBC # FLD: 14.8 % — HIGH (ref 10.3–14.5)
SODIUM SERPL-SCNC: 136 MMOL/L — SIGNIFICANT CHANGE UP (ref 135–145)
WBC # BLD: 7.79 K/UL — SIGNIFICANT CHANGE UP (ref 3.8–10.5)
WBC # FLD AUTO: 7.79 K/UL — SIGNIFICANT CHANGE UP (ref 3.8–10.5)

## 2019-12-12 RX ORDER — MAGNESIUM SULFATE 500 MG/ML
2 VIAL (ML) INJECTION ONCE
Refills: 0 | Status: COMPLETED | OUTPATIENT
Start: 2019-12-12 | End: 2019-12-12

## 2019-12-12 RX ORDER — SODIUM CHLORIDE 9 MG/ML
1000 INJECTION, SOLUTION INTRAVENOUS
Refills: 0 | Status: DISCONTINUED | OUTPATIENT
Start: 2019-12-12 | End: 2019-12-13

## 2019-12-12 RX ORDER — ACETAMINOPHEN 500 MG
1000 TABLET ORAL EVERY 6 HOURS
Refills: 0 | Status: COMPLETED | OUTPATIENT
Start: 2019-12-12 | End: 2019-12-13

## 2019-12-12 RX ADMIN — Medication 1000 MILLIGRAM(S): at 18:07

## 2019-12-12 RX ADMIN — Medication 125 MICROGRAM(S): at 05:24

## 2019-12-12 RX ADMIN — Medication 400 MILLIGRAM(S): at 05:24

## 2019-12-12 RX ADMIN — Medication 15 MILLIGRAM(S): at 16:45

## 2019-12-12 RX ADMIN — ATORVASTATIN CALCIUM 10 MILLIGRAM(S): 80 TABLET, FILM COATED ORAL at 21:12

## 2019-12-12 RX ADMIN — Medication 15 MILLIGRAM(S): at 16:05

## 2019-12-12 RX ADMIN — Medication 400 MILLIGRAM(S): at 17:34

## 2019-12-12 RX ADMIN — LOSARTAN POTASSIUM 50 MILLIGRAM(S): 100 TABLET, FILM COATED ORAL at 05:24

## 2019-12-12 RX ADMIN — Medication 1000 MILLIGRAM(S): at 23:31

## 2019-12-12 RX ADMIN — Medication 1000 MILLIGRAM(S): at 11:53

## 2019-12-12 RX ADMIN — Medication 400 MILLIGRAM(S): at 23:27

## 2019-12-12 RX ADMIN — Medication 50 GRAM(S): at 10:01

## 2019-12-12 RX ADMIN — Medication 400 MILLIGRAM(S): at 11:24

## 2019-12-12 RX ADMIN — FINASTERIDE 5 MILLIGRAM(S): 5 TABLET, FILM COATED ORAL at 11:24

## 2019-12-12 RX ADMIN — ENOXAPARIN SODIUM 40 MILLIGRAM(S): 100 INJECTION SUBCUTANEOUS at 23:28

## 2019-12-12 RX ADMIN — TAMSULOSIN HYDROCHLORIDE 0.4 MILLIGRAM(S): 0.4 CAPSULE ORAL at 21:12

## 2019-12-12 NOTE — PROGRESS NOTE ADULT - ASSESSMENT
79y Male POD1 s/p small bowel resection and anastomosis, liver biopsy and umbilical hernia repair  - NPO except for medications  - D51/2NS +20K @ 125ml/hr  - Pain control with acetaminophen/toradol  - Continue home protonix/finasteride  - Discontinue nuno, patient DTV  - Continue home synthroid  - Continue home losartan  - VTE ppx w/ Lovenox  - OOB/ambulation    D Team Surgery #[id number]

## 2019-12-12 NOTE — PROGRESS NOTE ADULT - SUBJECTIVE AND OBJECTIVE BOX
ANESTHESIA POSTOP CHECK    79y Male POSTOP DAY 1 S/P resection of abdominal mass  pod1    Vital Signs Last 24 Hrs  T(C): 36.6 (12 Dec 2019 08:13), Max: 36.7 (11 Dec 2019 14:00)  T(F): 97.9 (12 Dec 2019 08:13), Max: 98.1 (11 Dec 2019 14:00)  HR: 64 (12 Dec 2019 08:13) (64 - 83)  BP: 125/67 (12 Dec 2019 08:13) (104/56 - 130/66)  BP(mean): 72 (11 Dec 2019 19:00) (65 - 76)  RR: 17 (12 Dec 2019 08:13) (11 - 21)  SpO2: 100% (12 Dec 2019 08:13) (94% - 100%)  I&O's Summary    11 Dec 2019 07:01  -  12 Dec 2019 07:00  --------------------------------------------------------  IN: 2225 mL / OUT: 1192 mL / NET: 1033 mL    12 Dec 2019 07:01  -  12 Dec 2019 11:06  --------------------------------------------------------  IN: 0 mL / OUT: 450 mL / NET: -450 mL        [x ] NO APPARENT ANESTHESIA COMPLICATIONS      Comments:

## 2019-12-12 NOTE — PROVIDER CONTACT NOTE (OTHER) - SITUATION
pt s/p robotic assisted small bowel resection and liver biopsy. Pt a&0x4 when admitted to floor from PACU. Pt woke up out of sleep confused to place, reoriented patient, states he had a bad dream.

## 2019-12-12 NOTE — PROGRESS NOTE ADULT - SUBJECTIVE AND OBJECTIVE BOX
D TEAM SURGERY PROGRESS NOTE    POST OPERATIVE DAY #: 1 s/p small bowel resection for sarcoma, liver biopsy and umbilical hernia repair    SUBJECTIVE: Patient seen and examined at bedside on AM rounds, patient without complaints, comfortable with adequate pain control. Has not yet been OOB/ambulating. Denies chest pain/SOB. Denies nausea/vomiting.    Vital Signs Last 24 Hrs  T(C): 36.6 (12 Dec 2019 08:13), Max: 36.7 (11 Dec 2019 14:00)  T(F): 97.9 (12 Dec 2019 08:13), Max: 98.1 (11 Dec 2019 14:00)  HR: 64 (12 Dec 2019 08:13) (64 - 83)  BP: 125/67 (12 Dec 2019 08:13) (104/56 - 130/66)  BP(mean): 72 (11 Dec 2019 19:00) (65 - 76)  RR: 17 (12 Dec 2019 08:13) (11 - 21)  SpO2: 100% (12 Dec 2019 08:13) (94% - 100%)  I&O's Detail    11 Dec 2019 07:01  -  12 Dec 2019 07:00  --------------------------------------------------------  IN:    Lactated Ringers IV Bolus: 1000 mL    lactated ringers.: 1225 mL  Total IN: 2225 mL    OUT:    Indwelling Catheter - Urethral: 1192 mL  Total OUT: 1192 mL    Total NET: 1033 mL    MEDICATIONS  (STANDING):  acetaminophen  IVPB .. 1000 milliGRAM(s) IV Intermittent once  atorvastatin 10 milliGRAM(s) Oral at bedtime  dextrose 50% Injectable 12.5 Gram(s) IV Push once  dextrose 50% Injectable 25 Gram(s) IV Push once  dextrose 50% Injectable 25 Gram(s) IV Push once  enoxaparin Injectable 40 milliGRAM(s) SubCutaneous every 24 hours  finasteride 5 milliGRAM(s) Oral daily  influenza   Vaccine 0.5 milliLiter(s) IntraMuscular once  insulin lispro (HumaLOG) corrective regimen sliding scale   SubCutaneous every 6 hours  ketorolac   Injectable 15 milliGRAM(s) IV Push every 8 hours  lactated ringers. 1000 milliLiter(s) (125 mL/Hr) IV Continuous <Continuous>  levothyroxine 125 MICROGram(s) Oral daily  losartan 50 milliGRAM(s) Oral daily  pantoprazole    Tablet 40 milliGRAM(s) Oral before breakfast  tamsulosin 0.4 milliGRAM(s) Oral at bedtime    MEDICATIONS  (PRN):  dextrose 40% Gel 15 Gram(s) Oral once PRN Blood Glucose LESS THAN 70 milliGRAM(s)/deciliter  glucagon  Injectable 1 milliGRAM(s) IntraMuscular once PRN Glucose LESS THAN 70 milligrams/deciliter      Physical Exam  General: A&Ox3, NAD  Respiratory: Clear bilaterally, equal bilateral expansion  Cardiovascular: Regular rate & rhythm  Abdominal:     LABS:                        12.0   7.79  )-----------( 183      ( 12 Dec 2019 05:40 )             37.5 D TEAM SURGERY PROGRESS NOTE    POST OPERATIVE DAY #: 1 s/p small bowel resection for sarcoma, liver biopsy and umbilical hernia repair    SUBJECTIVE: Patient seen and examined at bedside on AM rounds, patient without complaints, comfortable with adequate pain control. Has not yet been OOB/ambulating. Denies chest pain/SOB. Denies nausea/vomiting.    Vital Signs Last 24 Hrs  T(C): 36.6 (12 Dec 2019 08:13), Max: 36.7 (11 Dec 2019 14:00)  T(F): 97.9 (12 Dec 2019 08:13), Max: 98.1 (11 Dec 2019 14:00)  HR: 64 (12 Dec 2019 08:13) (64 - 83)  BP: 125/67 (12 Dec 2019 08:13) (104/56 - 130/66)  BP(mean): 72 (11 Dec 2019 19:00) (65 - 76)  RR: 17 (12 Dec 2019 08:13) (11 - 21)  SpO2: 100% (12 Dec 2019 08:13) (94% - 100%)  I&O's Detail    11 Dec 2019 07:01  -  12 Dec 2019 07:00  --------------------------------------------------------  IN:    Lactated Ringers IV Bolus: 1000 mL    lactated ringers.: 1225 mL  Total IN: 2225 mL    OUT:    Indwelling Catheter - Urethral: 1192 mL  Total OUT: 1192 mL    Total NET: 1033 mL    MEDICATIONS  (STANDING):  acetaminophen  IVPB .. 1000 milliGRAM(s) IV Intermittent once  atorvastatin 10 milliGRAM(s) Oral at bedtime  dextrose 50% Injectable 12.5 Gram(s) IV Push once  dextrose 50% Injectable 25 Gram(s) IV Push once  dextrose 50% Injectable 25 Gram(s) IV Push once  enoxaparin Injectable 40 milliGRAM(s) SubCutaneous every 24 hours  finasteride 5 milliGRAM(s) Oral daily  influenza   Vaccine 0.5 milliLiter(s) IntraMuscular once  insulin lispro (HumaLOG) corrective regimen sliding scale   SubCutaneous every 6 hours  ketorolac   Injectable 15 milliGRAM(s) IV Push every 8 hours  lactated ringers. 1000 milliLiter(s) (125 mL/Hr) IV Continuous <Continuous>  levothyroxine 125 MICROGram(s) Oral daily  losartan 50 milliGRAM(s) Oral daily  pantoprazole    Tablet 40 milliGRAM(s) Oral before breakfast  tamsulosin 0.4 milliGRAM(s) Oral at bedtime    MEDICATIONS  (PRN):  dextrose 40% Gel 15 Gram(s) Oral once PRN Blood Glucose LESS THAN 70 milliGRAM(s)/deciliter  glucagon  Injectable 1 milliGRAM(s) IntraMuscular once PRN Glucose LESS THAN 70 milligrams/deciliter      Physical Exam  General: A&Ox3, NAD  Respiratory: Clear bilaterally, equal bilateral expansion  Cardiovascular: Regular rate & rhythm  Abdominal: Soft, NT/ND    LABS:                        12.0   7.79  )-----------( 183      ( 12 Dec 2019 05:40 )             37.5

## 2019-12-13 ENCOUNTER — TRANSCRIPTION ENCOUNTER (OUTPATIENT)
Age: 79
End: 2019-12-13

## 2019-12-13 LAB
ALBUMIN SERPL ELPH-MCNC: 3.5 G/DL — SIGNIFICANT CHANGE UP (ref 3.3–5)
ALP SERPL-CCNC: 73 U/L — SIGNIFICANT CHANGE UP (ref 40–120)
ALT FLD-CCNC: 28 U/L — SIGNIFICANT CHANGE UP (ref 4–41)
ANION GAP SERPL CALC-SCNC: 12 MMO/L — SIGNIFICANT CHANGE UP (ref 7–14)
APTT BLD: 28.6 SEC — SIGNIFICANT CHANGE UP (ref 27.5–36.3)
AST SERPL-CCNC: 35 U/L — SIGNIFICANT CHANGE UP (ref 4–40)
BASOPHILS # BLD AUTO: 0.03 K/UL — SIGNIFICANT CHANGE UP (ref 0–0.2)
BASOPHILS NFR BLD AUTO: 0.5 % — SIGNIFICANT CHANGE UP (ref 0–2)
BILIRUB SERPL-MCNC: 0.7 MG/DL — SIGNIFICANT CHANGE UP (ref 0.2–1.2)
BUN SERPL-MCNC: 8 MG/DL — SIGNIFICANT CHANGE UP (ref 7–23)
CALCIUM SERPL-MCNC: 8.3 MG/DL — LOW (ref 8.4–10.5)
CHLORIDE SERPL-SCNC: 101 MMOL/L — SIGNIFICANT CHANGE UP (ref 98–107)
CO2 SERPL-SCNC: 24 MMOL/L — SIGNIFICANT CHANGE UP (ref 22–31)
CREAT SERPL-MCNC: 0.66 MG/DL — SIGNIFICANT CHANGE UP (ref 0.5–1.3)
EOSINOPHIL # BLD AUTO: 0.1 K/UL — SIGNIFICANT CHANGE UP (ref 0–0.5)
EOSINOPHIL NFR BLD AUTO: 1.8 % — SIGNIFICANT CHANGE UP (ref 0–6)
GLUCOSE BLDC GLUCOMTR-MCNC: 112 MG/DL — HIGH (ref 70–99)
GLUCOSE BLDC GLUCOMTR-MCNC: 113 MG/DL — HIGH (ref 70–99)
GLUCOSE BLDC GLUCOMTR-MCNC: 118 MG/DL — HIGH (ref 70–99)
GLUCOSE BLDC GLUCOMTR-MCNC: 86 MG/DL — SIGNIFICANT CHANGE UP (ref 70–99)
GLUCOSE SERPL-MCNC: 124 MG/DL — HIGH (ref 70–99)
HCT VFR BLD CALC: 39.6 % — SIGNIFICANT CHANGE UP (ref 39–50)
HGB BLD-MCNC: 12.7 G/DL — LOW (ref 13–17)
IMM GRANULOCYTES NFR BLD AUTO: 0.4 % — SIGNIFICANT CHANGE UP (ref 0–1.5)
INR BLD: 1.06 — SIGNIFICANT CHANGE UP (ref 0.88–1.17)
LYMPHOCYTES # BLD AUTO: 0.67 K/UL — LOW (ref 1–3.3)
LYMPHOCYTES # BLD AUTO: 12.2 % — LOW (ref 13–44)
MAGNESIUM SERPL-MCNC: 1.9 MG/DL — SIGNIFICANT CHANGE UP (ref 1.6–2.6)
MCHC RBC-ENTMCNC: 27.8 PG — SIGNIFICANT CHANGE UP (ref 27–34)
MCHC RBC-ENTMCNC: 32.1 % — SIGNIFICANT CHANGE UP (ref 32–36)
MCV RBC AUTO: 86.7 FL — SIGNIFICANT CHANGE UP (ref 80–100)
MONOCYTES # BLD AUTO: 0.37 K/UL — SIGNIFICANT CHANGE UP (ref 0–0.9)
MONOCYTES NFR BLD AUTO: 6.7 % — SIGNIFICANT CHANGE UP (ref 2–14)
NEUTROPHILS # BLD AUTO: 4.3 K/UL — SIGNIFICANT CHANGE UP (ref 1.8–7.4)
NEUTROPHILS NFR BLD AUTO: 78.4 % — HIGH (ref 43–77)
NRBC # FLD: 0 K/UL — SIGNIFICANT CHANGE UP (ref 0–0)
PHOSPHATE SERPL-MCNC: 1.3 MG/DL — LOW (ref 2.5–4.5)
PLATELET # BLD AUTO: 185 K/UL — SIGNIFICANT CHANGE UP (ref 150–400)
PMV BLD: 10.1 FL — SIGNIFICANT CHANGE UP (ref 7–13)
POTASSIUM SERPL-MCNC: 4 MMOL/L — SIGNIFICANT CHANGE UP (ref 3.5–5.3)
POTASSIUM SERPL-SCNC: 4 MMOL/L — SIGNIFICANT CHANGE UP (ref 3.5–5.3)
PROT SERPL-MCNC: 6.4 G/DL — SIGNIFICANT CHANGE UP (ref 6–8.3)
PROTHROM AB SERPL-ACNC: 12.1 SEC — SIGNIFICANT CHANGE UP (ref 9.8–13.1)
RBC # BLD: 4.57 M/UL — SIGNIFICANT CHANGE UP (ref 4.2–5.8)
RBC # FLD: 14.6 % — HIGH (ref 10.3–14.5)
SODIUM SERPL-SCNC: 137 MMOL/L — SIGNIFICANT CHANGE UP (ref 135–145)
WBC # BLD: 5.49 K/UL — SIGNIFICANT CHANGE UP (ref 3.8–10.5)
WBC # FLD AUTO: 5.49 K/UL — SIGNIFICANT CHANGE UP (ref 3.8–10.5)

## 2019-12-13 RX ORDER — INSULIN LISPRO 100/ML
VIAL (ML) SUBCUTANEOUS AT BEDTIME
Refills: 0 | Status: DISCONTINUED | OUTPATIENT
Start: 2019-12-13 | End: 2019-12-14

## 2019-12-13 RX ORDER — INSULIN LISPRO 100/ML
VIAL (ML) SUBCUTANEOUS
Refills: 0 | Status: DISCONTINUED | OUTPATIENT
Start: 2019-12-13 | End: 2019-12-14

## 2019-12-13 RX ORDER — SODIUM,POTASSIUM PHOSPHATES 278-250MG
1 POWDER IN PACKET (EA) ORAL
Refills: 0 | Status: COMPLETED | OUTPATIENT
Start: 2019-12-13 | End: 2019-12-14

## 2019-12-13 RX ADMIN — Medication 1 PACKET(S): at 11:17

## 2019-12-13 RX ADMIN — Medication 1 PACKET(S): at 21:04

## 2019-12-13 RX ADMIN — Medication 1000 MILLIGRAM(S): at 05:47

## 2019-12-13 RX ADMIN — LOSARTAN POTASSIUM 50 MILLIGRAM(S): 100 TABLET, FILM COATED ORAL at 05:11

## 2019-12-13 RX ADMIN — Medication 1 PACKET(S): at 17:01

## 2019-12-13 RX ADMIN — Medication 15 MILLIGRAM(S): at 11:17

## 2019-12-13 RX ADMIN — ENOXAPARIN SODIUM 40 MILLIGRAM(S): 100 INJECTION SUBCUTANEOUS at 21:39

## 2019-12-13 RX ADMIN — Medication 63.75 MILLIMOLE(S): at 09:37

## 2019-12-13 RX ADMIN — ATORVASTATIN CALCIUM 10 MILLIGRAM(S): 80 TABLET, FILM COATED ORAL at 21:04

## 2019-12-13 RX ADMIN — Medication 400 MILLIGRAM(S): at 05:10

## 2019-12-13 RX ADMIN — Medication 15 MILLIGRAM(S): at 11:40

## 2019-12-13 RX ADMIN — Medication 125 MICROGRAM(S): at 05:11

## 2019-12-13 RX ADMIN — TAMSULOSIN HYDROCHLORIDE 0.4 MILLIGRAM(S): 0.4 CAPSULE ORAL at 21:05

## 2019-12-13 RX ADMIN — Medication 400 MILLIGRAM(S): at 11:17

## 2019-12-13 RX ADMIN — FINASTERIDE 5 MILLIGRAM(S): 5 TABLET, FILM COATED ORAL at 11:17

## 2019-12-13 RX ADMIN — Medication 1000 MILLIGRAM(S): at 12:00

## 2019-12-13 NOTE — DISCHARGE NOTE PROVIDER - CARE PROVIDER_API CALL
Cleve Saldivar)  Surgery  [address] [place] [address]  Phone: [phone]  Fax: [phone]  Follow Up Time: 1 week

## 2019-12-13 NOTE — DISCHARGE NOTE PROVIDER - HOSPITAL COURSE
Patient is a 79 yr old male who presented to [facility] on 12/11/19 for scheduled for Robotic Laparoscopic Poss Open Resection of Abdominal Mass with Dr Saldivar. Pt found to have peritoneal mass during w/u for UTI - biopsy positive for Ca. Pt denies pain or GI symptoms. Hx of spinal surgery with hardware 2016. Patient went to the OR as scheduled for mesenteric mass resection, SB resection with side to side anastomosis, open umbilical hernia repair, and laparoscopic liver biopsy. Patient tolerated the procedure well, without complication. Patient remained hemodynamically stable in the PACU and transferred to the surgical floor. Diet was restarted and advanced as tolerated. Pain control was transitioned from IV to PO pain meds. At this time, patient is currently ambulating, voiding, tolerating a regular diet. Pain well controlled on PO pain meds. Patient has been deemed stable for discharge home with follow up as an outpatient. Patient is a 79 yr old male who presented to [facility] on 12/11/19 for scheduled for Robotic Laparoscopic Poss Open Resection of Abdominal Mass with Dr Saldivar. Pt found to have peritoneal mass during w/u for UTI - biopsy positive for Ca. Pt denies pain or GI symptoms. Hx of spinal surgery with hardware 2016. Patient went to the OR as scheduled for mesenteric mass resection, SB resection with side to side anastomosis, open umbilical hernia repair, and laparoscopic liver biopsy. Patient tolerated the procedure well, without complication. Patient remained hemodynamically stable in the PACU and transferred to the surgical floor. Diet was restarted and advanced as tolerated. Pain control was transitioned from IV to PO pain meds. At this time, patient is currently ambulating, voiding, tolerating a regular diet. Pain well controlled on PO pain meds. Patient has been deemed stable for discharge home with follow up as an outpatient.         ATTENDING NOTE    - I have seen and examined the patient on rounds. Patient's chart, labs, images and reports reviewed.    Pain well controlled    Abd soft NTND    Kimberly diet well    -Patient evaluated to be stable for discharge. Patient educated with DC instructions, warning signs and symptoms . Pt also instructed to follow up with the surgeon in 1 week in office. Pt expressed verbal understanding.    - I have discussed the diagnosis with the patient in detail. Patient expressed verbal understanding and willingness to proceed with proposed plan. All questions answered    -Patient instructed not to perform heavy activity and/or  lifting >10lbs for 8 weeks. Patient expressed verbal understanding.    Regards    Anirudh Mcleod MD, FACS, FICS    - [employer] [facility]    Division of Surgical Oncology, Department of Surgery    [address] [address]        [address] [place] [id number]    Ph: [phone]    Fax: [phone]

## 2019-12-13 NOTE — DISCHARGE NOTE PROVIDER - NSDCCPCAREPLAN_GEN_ALL_CORE_FT
PRINCIPAL DISCHARGE DIAGNOSIS  Diagnosis: Abdominal mass  Assessment and Plan of Treatment: WOUND CARE:  Please keep incisions clean and dry. Please do not Scrub or rub incisions. Do not use lotion or powder on incisions.   BATHING: You may shower and/or sponge bathe. You may use warm soapy water in the shower and rinse, pat dry.  ACTIVITY: No heavy lifting or straining. Otherwise, you may return to your usual level of physical activity. If you are taking narcotic pain medication DO NOT drive a car, operate machinery or make important decisions.  DIET: Return to your usual diet.  NOTIFY YOUR SURGEON IF YOU HAVE: any bleeding that does not stop, any pus draining from your wound(s), any fever (over 100.4 F) persistent nausea/vomiting, or if your pain is not controlled on your discharge pain medications, unable to urinate.  Please follow up with your primary care physician in one week regarding your hospitalization, bring copies of your discharge paperwork.  Please follow up with your surgeon, Dr. Saldivar. Call [phone] to make an appointment.

## 2019-12-13 NOTE — DISCHARGE NOTE PROVIDER - NSDCMRMEDTOKEN_GEN_ALL_CORE_FT
calcium: 600 milligram(s) orally once a day  Centrum Silver oral tablet: 1 tab(s) orally once a day LD 12/3/19  cinnamon: 1000 milligram(s) orally once a day PM  12/3/19  eyevite: 1 tab(s) orally once a day LD 12/3/19  finasteride 5 mg oral tablet: 1 tab(s) orally once a day PM  Fish Oil 1200 mg oral capsule: 1 cap(s) orally once a day LD 12/3/19  losartan 50 mg oral tablet: 1 tab(s) orally once a day PM  lovastatin 20 mg oral tablet: 1 tab(s) orally once a day AM  metFORMIN 1000 mg oral tablet: 1 tab(s) orally 2 times a day  Myrbetriq 50 mg oral tablet, extended release: 1 tab(s) orally once a day  omeprazole 20 mg oral delayed release capsule: 1 cap(s) orally once a day AM  Synthroid 125 mcg (0.125 mg) oral tablet: 1 tab(s) orally once a day AM  tamsulosin 0.4 mg oral capsule: 1 cap(s) orally once a day PM

## 2019-12-13 NOTE — PROGRESS NOTE ADULT - SUBJECTIVE AND OBJECTIVE BOX
D TEAM SURGERY PROGRESS NOTE    POST OPERATIVE DAY #: 2 s/p small bowel resection for sarcoma, liver biopsy and umbilical hernia repair    SUBJECTIVE: Patient seen and examined at bedside on AM rounds, patient without complaints. Passing flatus, reports feeling less distended today than yesterday. Bernal was removed and he was able to void successfully Has been ambulating. Feels hungry and would like to try CLD for breakfast. Denies chest pain. Denies nausea/vomiting. No belching.     Vital Signs Last 24 Hrs  T(C): 36.7 (13 Dec 2019 05:09), Max: 36.8 (12 Dec 2019 12:09)  T(F): 98.1 (13 Dec 2019 05:09), Max: 98.3 (12 Dec 2019 12:09)  HR: 70 (13 Dec 2019 05:09) (63 - 70)  BP: 140/75 (13 Dec 2019 05:09) (128/69 - 143/77)  BP(mean): --  RR: 16 (13 Dec 2019 05:09) (16 - 17)  SpO2: 96% (13 Dec 2019 05:09) (96% - 100%)  I&O's Detail    12 Dec 2019 07:01  -  13 Dec 2019 07:00  --------------------------------------------------------  IN:  Total IN: 0 mL    OUT:    Indwelling Catheter - Urethral: 450 mL    Voided: 3125 mL  Total OUT: 3575 mL    Total NET: -3575 mL      13 Dec 2019 07:01  -  13 Dec 2019 08:27  --------------------------------------------------------  IN:  Total IN: 0 mL    OUT:    Voided: 600 mL  Total OUT: 600 mL    Total NET: -600 mL        MEDICATIONS  (STANDING):  acetaminophen  IVPB .. 1000 milliGRAM(s) IV Intermittent every 6 hours  atorvastatin 10 milliGRAM(s) Oral at bedtime  dextrose 5% + sodium chloride 0.45% 1000 milliLiter(s) (50 mL/Hr) IV Continuous <Continuous>  dextrose 50% Injectable 12.5 Gram(s) IV Push once  dextrose 50% Injectable 25 Gram(s) IV Push once  dextrose 50% Injectable 25 Gram(s) IV Push once  enoxaparin Injectable 40 milliGRAM(s) SubCutaneous every 24 hours  finasteride 5 milliGRAM(s) Oral daily  influenza   Vaccine 0.5 milliLiter(s) IntraMuscular once  insulin lispro (HumaLOG) corrective regimen sliding scale   SubCutaneous every 6 hours  ketorolac   Injectable 15 milliGRAM(s) IV Push every 8 hours  levothyroxine 125 MICROGram(s) Oral daily  losartan 50 milliGRAM(s) Oral daily  pantoprazole    Tablet 40 milliGRAM(s) Oral before breakfast  potassium phosphate / sodium phosphate powder 1 Packet(s) Oral four times a day before meals  sodium phosphate IVPB 15 milliMole(s) IV Intermittent once  tamsulosin 0.4 milliGRAM(s) Oral at bedtime    MEDICATIONS  (PRN):  dextrose 40% Gel 15 Gram(s) Oral once PRN Blood Glucose LESS THAN 70 milliGRAM(s)/deciliter  glucagon  Injectable 1 milliGRAM(s) IntraMuscular once PRN Glucose LESS THAN 70 milligrams/deciliter      Physical Exam  General: A&Ox3, NAD  Respiratory: Clear bilaterally, equal bilateral expansion  Cardiovascular: Regular rate & rhythm  Abdominal: Soft, NT/ND, incisions c/d/i    LABS:                        12.7   5.49  )-----------( 185      ( 13 Dec 2019 05:37 )             39.6     12-13    137  |  101  |  8   ----------------------------<  124<H>  4.0   |  24  |  0.66    Ca    8.3<L>      13 Dec 2019 05:37  Phos  1.3     12-13  Mg     1.9     12-13    TPro  6.4  /  Alb  3.5  /  TBili  0.7  /  DBili  x   /  AST  35  /  ALT  28  /  AlkPhos  73  12-13    PT/INR - ( 13 Dec 2019 05:37 )   PT: 12.1 SEC;   INR: 1.06          PTT - ( 13 Dec 2019 05:37 )  PTT:28.6 SEC

## 2019-12-13 NOTE — DISCHARGE NOTE PROVIDER - NSDCCPTREATMENT_GEN_ALL_CORE_FT
PRINCIPAL PROCEDURE  Procedure: Repair, hernia, umbilical, open, adult  Findings and Treatment:       SECONDARY PROCEDURE  Procedure: Laparoscopic liver biopsy  Findings and Treatment:     Procedure: Small bowel resection with anastomosis  Findings and Treatment:

## 2019-12-13 NOTE — PROGRESS NOTE ADULT - ASSESSMENT
79y Male POD2 s/p small bowel resection and anastomosis, liver biopsy and umbilical hernia repair  - CLD for breakfast, possible regular diet for lunch  - D51/2NS +20K @ 50ml/hr, will IVL when tolerating adequate PO  - Pain control with acetaminophen/toradol  - Continue home protonix/finasteride  - Continue home synthroid  - Continue home losartan  - VTE ppx w/ Lovenox  - OOB/ambulation  - Discharge planning    D Team Surgery #[id number]

## 2019-12-14 ENCOUNTER — TRANSCRIPTION ENCOUNTER (OUTPATIENT)
Age: 79
End: 2019-12-14

## 2019-12-14 VITALS
OXYGEN SATURATION: 96 % | SYSTOLIC BLOOD PRESSURE: 141 MMHG | HEART RATE: 90 BPM | TEMPERATURE: 99 F | DIASTOLIC BLOOD PRESSURE: 73 MMHG | RESPIRATION RATE: 18 BRPM

## 2019-12-14 LAB
ANION GAP SERPL CALC-SCNC: 13 MMO/L — SIGNIFICANT CHANGE UP (ref 7–14)
BUN SERPL-MCNC: 8 MG/DL — SIGNIFICANT CHANGE UP (ref 7–23)
CALCIUM SERPL-MCNC: 8.6 MG/DL — SIGNIFICANT CHANGE UP (ref 8.4–10.5)
CHLORIDE SERPL-SCNC: 99 MMOL/L — SIGNIFICANT CHANGE UP (ref 98–107)
CO2 SERPL-SCNC: 23 MMOL/L — SIGNIFICANT CHANGE UP (ref 22–31)
CREAT SERPL-MCNC: 0.63 MG/DL — SIGNIFICANT CHANGE UP (ref 0.5–1.3)
GLUCOSE BLDC GLUCOMTR-MCNC: 105 MG/DL — HIGH (ref 70–99)
GLUCOSE SERPL-MCNC: 90 MG/DL — SIGNIFICANT CHANGE UP (ref 70–99)
HCT VFR BLD CALC: 39.3 % — SIGNIFICANT CHANGE UP (ref 39–50)
HGB BLD-MCNC: 12.6 G/DL — LOW (ref 13–17)
MAGNESIUM SERPL-MCNC: 1.8 MG/DL — SIGNIFICANT CHANGE UP (ref 1.6–2.6)
MCHC RBC-ENTMCNC: 27.6 PG — SIGNIFICANT CHANGE UP (ref 27–34)
MCHC RBC-ENTMCNC: 32.1 % — SIGNIFICANT CHANGE UP (ref 32–36)
MCV RBC AUTO: 86 FL — SIGNIFICANT CHANGE UP (ref 80–100)
NRBC # FLD: 0 K/UL — SIGNIFICANT CHANGE UP (ref 0–0)
PHOSPHATE SERPL-MCNC: 2.1 MG/DL — LOW (ref 2.5–4.5)
PLATELET # BLD AUTO: 199 K/UL — SIGNIFICANT CHANGE UP (ref 150–400)
PMV BLD: 10.8 FL — SIGNIFICANT CHANGE UP (ref 7–13)
POTASSIUM SERPL-MCNC: 3.8 MMOL/L — SIGNIFICANT CHANGE UP (ref 3.5–5.3)
POTASSIUM SERPL-SCNC: 3.8 MMOL/L — SIGNIFICANT CHANGE UP (ref 3.5–5.3)
RBC # BLD: 4.57 M/UL — SIGNIFICANT CHANGE UP (ref 4.2–5.8)
RBC # FLD: 14.4 % — SIGNIFICANT CHANGE UP (ref 10.3–14.5)
SODIUM SERPL-SCNC: 135 MMOL/L — SIGNIFICANT CHANGE UP (ref 135–145)
WBC # BLD: 5.56 K/UL — SIGNIFICANT CHANGE UP (ref 3.8–10.5)
WBC # FLD AUTO: 5.56 K/UL — SIGNIFICANT CHANGE UP (ref 3.8–10.5)

## 2019-12-14 RX ORDER — ACETAMINOPHEN 500 MG
650 TABLET ORAL ONCE
Refills: 0 | Status: COMPLETED | OUTPATIENT
Start: 2019-12-14 | End: 2019-12-14

## 2019-12-14 RX ADMIN — Medication 1 PACKET(S): at 09:31

## 2019-12-14 RX ADMIN — Medication 650 MILLIGRAM(S): at 09:37

## 2019-12-14 RX ADMIN — PANTOPRAZOLE SODIUM 40 MILLIGRAM(S): 20 TABLET, DELAYED RELEASE ORAL at 09:31

## 2019-12-14 RX ADMIN — LOSARTAN POTASSIUM 50 MILLIGRAM(S): 100 TABLET, FILM COATED ORAL at 06:03

## 2019-12-14 RX ADMIN — Medication 125 MICROGRAM(S): at 06:03

## 2019-12-14 NOTE — DISCHARGE NOTE NURSING/CASE MANAGEMENT/SOCIAL WORK - PATIENT PORTAL LINK FT
You can access the FollowMyHealth Patient Portal offered by [facility] by registering at the following website: [email or web address]. By joining [employer]’s FollowMyHealth portal, you will also be able to view your health information using other applications (apps) compatible with our system.

## 2019-12-14 NOTE — PROGRESS NOTE ADULT - SUBJECTIVE AND OBJECTIVE BOX
Interval Events:    Tolerated regular diet yesterday    S: Patient doing well, denies fevers, chills, nausea, emesis, chest pain, SOB.  Pain is well controlled. Tolerating PO w/o N/V.  +/+ F/BM.    O: Vital Signs Last 24 Hrs  T(C): 37.1 (14 Dec 2019 07:50), Max: 37.1 (14 Dec 2019 07:50)  T(F): 98.8 (14 Dec 2019 07:50), Max: 98.8 (14 Dec 2019 07:50)  HR: 90 (14 Dec 2019 07:50) (68 - 90)  BP: 141/73 (14 Dec 2019 07:50) (125/67 - 149/80)  BP(mean): --  RR: 18 (14 Dec 2019 07:50) (17 - 18)  SpO2: 96% (14 Dec 2019 07:50) (96% - 99%)      13 Dec 2019 07:01  -  14 Dec 2019 07:00  --------------------------------------------------------  IN:    Oral Fluid: 1480 mL  Total IN: 1480 mL    OUT:    Voided: 2650 mL  Total OUT: 2650 mL    Total NET: -1170 mL          Physical Exam:    General: A&Ox3, NAD  Respiratory: Clear bilaterally, equal bilateral expansion  Cardiovascular: Regular rate & rhythm  Abdominal: Soft, NT/ND, incisions c/d/i    Labs:                        12.6   5.56  )-----------( 199      ( 14 Dec 2019 06:45 )             39.3     14 Dec 2019 06:45    135    |  99     |  8      ----------------------------<  90     3.8     |  23     |  0.63     Ca    8.6        14 Dec 2019 06:45  Phos  2.1       14 Dec 2019 06:45  Mg     1.8       14 Dec 2019 06:45    TPro  6.4    /  Alb  3.5    /  TBili  0.7    /  DBili  x      /  AST  35     /  ALT  28     /  AlkPhos  73     13 Dec 2019 05:37    PT/INR - ( 13 Dec 2019 05:37 )   PT: 12.1 SEC;   INR: 1.06          PTT - ( 13 Dec 2019 05:37 )  PTT:28.6 SEC  CAPILLARY BLOOD GLUCOSE      POCT Blood Glucose.: 105 mg/dL (14 Dec 2019 08:46)  POCT Blood Glucose.: 118 mg/dL (13 Dec 2019 22:27)  POCT Blood Glucose.: 86 mg/dL (13 Dec 2019 17:44)  POCT Blood Glucose.: 113 mg/dL (13 Dec 2019 12:25)        LIVER FUNCTIONS - ( 13 Dec 2019 05:37 )  Alb: 3.5 g/dL / Pro: 6.4 g/dL / ALK PHOS: 73 u/L / ALT: 28 u/L / AST: 35 u/L / GGT: x                 MEDICATIONS  (STANDING):  atorvastatin 10 milliGRAM(s) Oral at bedtime  dextrose 50% Injectable 12.5 Gram(s) IV Push once  dextrose 50% Injectable 25 Gram(s) IV Push once  dextrose 50% Injectable 25 Gram(s) IV Push once  enoxaparin Injectable 40 milliGRAM(s) SubCutaneous every 24 hours  finasteride 5 milliGRAM(s) Oral daily  influenza   Vaccine 0.5 milliLiter(s) IntraMuscular once  insulin lispro (HumaLOG) corrective regimen sliding scale   SubCutaneous three times a day before meals  insulin lispro (HumaLOG) corrective regimen sliding scale   SubCutaneous at bedtime  levothyroxine 125 MICROGram(s) Oral daily  losartan 50 milliGRAM(s) Oral daily  pantoprazole    Tablet 40 milliGRAM(s) Oral before breakfast  tamsulosin 0.4 milliGRAM(s) Oral at bedtime    MEDICATIONS  (PRN):  dextrose 40% Gel 15 Gram(s) Oral once PRN Blood Glucose LESS THAN 70 milliGRAM(s)/deciliter  glucagon  Injectable 1 milliGRAM(s) IntraMuscular once PRN Glucose LESS THAN 70 milligrams/deciliter

## 2019-12-14 NOTE — PROGRESS NOTE ADULT - ASSESSMENT
79y Male POD2 s/p small bowel resection and anastomosis, liver biopsy and umbilical hernia repair. doing well  - regular diet today  - Pain control with acetaminophen/toradol  - Continue home protonix/finasteride  - Continue home synthroid  - Continue home losartan  - VTE ppx w/ Lovenox  - OOB/ambulation      - Discharge today    D Team Surgery #[id number]

## 2019-12-16 PROBLEM — R19.00 INTRA-ABDOMINAL AND PELVIC SWELLING, MASS AND LUMP, UNSPECIFIED SITE: Chronic | Status: ACTIVE | Noted: 2019-11-29

## 2019-12-16 PROBLEM — E03.9 HYPOTHYROIDISM, UNSPECIFIED: Chronic | Status: ACTIVE | Noted: 2019-11-29

## 2019-12-18 ENCOUNTER — FORM ENCOUNTER (OUTPATIENT)
Age: 79
End: 2019-12-18

## 2019-12-19 ENCOUNTER — OUTPATIENT (OUTPATIENT)
Dept: OUTPATIENT SERVICES | Facility: HOSPITAL | Age: 79
LOS: 1 days | End: 2019-12-19
Payer: COMMERCIAL

## 2019-12-19 ENCOUNTER — APPOINTMENT (OUTPATIENT)
Dept: SURGICAL ONCOLOGY | Facility: CLINIC | Age: 79
End: 2019-12-19
Payer: COMMERCIAL

## 2019-12-19 ENCOUNTER — APPOINTMENT (OUTPATIENT)
Dept: ULTRASOUND IMAGING | Facility: IMAGING CENTER | Age: 79
End: 2019-12-19
Payer: COMMERCIAL

## 2019-12-19 VITALS
OXYGEN SATURATION: 98 % | RESPIRATION RATE: 15 BRPM | WEIGHT: 189 LBS | HEART RATE: 76 BPM | BODY MASS INDEX: 23.02 KG/M2 | HEIGHT: 76 IN | DIASTOLIC BLOOD PRESSURE: 76 MMHG | SYSTOLIC BLOOD PRESSURE: 128 MMHG

## 2019-12-19 DIAGNOSIS — E89.0 POSTPROCEDURAL HYPOTHYROIDISM: Chronic | ICD-10-CM

## 2019-12-19 DIAGNOSIS — Z98.890 OTHER SPECIFIED POSTPROCEDURAL STATES: Chronic | ICD-10-CM

## 2019-12-19 DIAGNOSIS — C48.1 MALIGNANT NEOPLASM OF SPECIFIED PARTS OF PERITONEUM: ICD-10-CM

## 2019-12-19 PROCEDURE — 76882 US LMTD JT/FCL EVL NVASC XTR: CPT | Mod: 26,RT

## 2019-12-19 PROCEDURE — 76882 US LMTD JT/FCL EVL NVASC XTR: CPT

## 2019-12-19 PROCEDURE — 99024 POSTOP FOLLOW-UP VISIT: CPT

## 2019-12-19 NOTE — CONSULT LETTER
[Dear  ___] : Dear  [unfilled], [Consult Letter:] : I had the pleasure of evaluating your patient, [unfilled]. [Please see my note below.] : Please see my note below. [Consult Closing:] : Thank you very much for allowing me to participate in the care of this patient.  If you have any questions, please do not hesitate to contact me. [Sincerely,] : Sincerely, [DrEliseo  ___] : Dr. MADRIGAL [DrEliseo ___] : Dr. MADRIGAL [FreeTextEntry2] : Mandeep Conley MD [FreeTextEntry1] : Alphonse is a pleasant 79 year-old male here for an initial postop visit, status post laparoscopic assisted resection of abdominal mass, small bowel resection, umbilical hernia repair and liver biopsy on 12/11/19.  Final pathology is pending.  He is feeling well and denies any significant pain, fever or chills.  He is tolerating diet without nausea/vomiting and moving his bowels without difficulty. \par \par On exam, the incisions are healing well.  \par \par Alphonse will follow up in 3 weeks.  We will discuss his pathology once its available. [FreeTextEntry3] : Cleve Saldivar MD\par Surgical Oncology\par [facility]\par Office: [phone]\par Cell: [phone]\par

## 2019-12-19 NOTE — PHYSICAL EXAM
[Normal] : well developed, well nourished, in no acute distress [de-identified] : Incisions healing well with no evidence of infection.

## 2019-12-19 NOTE — ASSESSMENT
[FreeTextEntry1] : Alphonse will follow up in 3 weeks.  We will discuss his pathology once its available.

## 2019-12-19 NOTE — HISTORY OF PRESENT ILLNESS
[de-identified] : Alphonse is a pleasant 79 year-old male here for an initial postop visit, status post laparoscopic assisted resection of abdominal mass, small bowel resection, umbilical hernia repair and liver biopsy on 12/11/19.  Final pathology is pending.  He is feeling well and denies any significant pain, fever or chills.  He is tolerating diet without nausea/vomiting and moving his bowels without difficulty. \par \par Previous pertinent history is as follows:\par \par He was initially seen in consultation on 11/7/19.  He experienced an UTI and was referred for an ultrasound per his urologist with suggestion of a mass.  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  Mesenteric vessels are seen around the mass.  Suggested differentials include carcinoid, lymphoma or neurogenic tumor.  A chest CT on 10/24/19 showed no evidence of metastatic disease.  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   \par \par Dr. Lozada performed an EGD and colonoscopy on  May 2017 with benign findings including 3 colonic polyps. \par \par His past medical history includes DM II, hypertension, hyperlipidemia, DDD, BPH and hypothyroidism.  Last year he was diagnosed with a liver abscess which was drained at [facility] and treated with antibiotics (patient was worked up by ID, ultimately felt to be secondary to food borne illness).   Past surgical history includes a partial thyroidectomy in 1999 for a benign condition, and back surgery for spinal stenosis (hardware in place).  His records indicate that he suffered a subarachnoid hemorrhage, but patient states he had slipped and fallen in 2014 and a CT of his brain showed a miniscule bleed.  He denies any loss of consciousness at that time, but states that as part of syncope work up, cardiology implanted a cardiac monitoring device which was ultimately removed with no abnormal findings.   He takes baby aspirin daily for prophylaxis but has stopped recently in anticipation of surgical procedure.   He denies any family history of malignancies.  He denies alcohol or tobacco use. \par

## 2019-12-19 NOTE — REASON FOR VISIT
[Post-Op] : a post-op for [FreeTextEntry2] : status post laparoscopic assisted resection of abdominal mass, small bowel resection, umbilical hernia repair and liver biopsy on 12/11/19

## 2019-12-23 LAB — SURGICAL PATHOLOGY STUDY: SIGNIFICANT CHANGE UP

## 2020-01-14 ENCOUNTER — APPOINTMENT (OUTPATIENT)
Dept: SURGICAL ONCOLOGY | Facility: CLINIC | Age: 80
End: 2020-01-14
Payer: COMMERCIAL

## 2020-01-14 VITALS
DIASTOLIC BLOOD PRESSURE: 75 MMHG | SYSTOLIC BLOOD PRESSURE: 112 MMHG | HEIGHT: 76 IN | WEIGHT: 194 LBS | OXYGEN SATURATION: 97 % | HEART RATE: 88 BPM | BODY MASS INDEX: 23.62 KG/M2 | RESPIRATION RATE: 15 BRPM

## 2020-01-14 PROCEDURE — 99024 POSTOP FOLLOW-UP VISIT: CPT

## 2020-01-14 NOTE — ASSESSMENT
[FreeTextEntry1] : Alphonse will follow up in 3  - 4 months. We discussed the need for medical oncology and radiation oncology evaluations.

## 2020-01-14 NOTE — HISTORY OF PRESENT ILLNESS
[de-identified] : Alphonse is a pleasant 79 year-old male here for a postop visit, status post laparoscopic assisted resection of abdominal mass, small bowel resection, umbilical hernia repair and liver biopsy on 12/11/19. 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 There are 12 mitotic figures per 10 high power fields. He is feeling well and denies any significant pain, fever or chills. He is tolerating diet without nausea/vomiting and moving his bowels without difficulty. \par \par Previous pertinent history is as follows:\par \par He was initially seen in consultation on 11/7/19. He experienced an UTI and was referred for an ultrasound per his urologist with suggestion of a mass. 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 Mesenteric vessels are seen around the mass. Suggested differentials include carcinoid, lymphoma or neurogenic tumor. A chest CT on 10/24/19 showed no evidence of metastatic disease. 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 \par \par Dr. Lozada performed an EGD and colonoscopy on May 2017 with benign findings including 3 colonic polyps. \par \par His past medical history includes DM II, hypertension, hyperlipidemia, DDD, BPH and hypothyroidism. Last year he was diagnosed with a liver abscess which was drained at [facility] and treated with antibiotics (patient was worked up by ID, ultimately felt to be secondary to food borne illness). Past surgical history includes a partial thyroidectomy in 1999 for a benign condition, and back surgery for spinal stenosis (hardware in place). His records indicate that he suffered a subarachnoid hemorrhage, but patient states he had slipped and fallen in 2014 and a CT of his brain showed a miniscule bleed. He denies any loss of consciousness at that time, but states that as part of syncope work up, cardiology implanted a cardiac monitoring device which was ultimately removed with no abnormal findings. He takes baby aspirin daily for prophylaxis but has stopped recently in anticipation of surgical procedure. He denies any family history of malignancies. He denies alcohol or tobacco use.

## 2020-01-14 NOTE — CONSULT LETTER
[Dear  ___] : Dear ~REGINO, [Consult Letter:] : I had the pleasure of evaluating your patient, [unfilled]. [Consult Closing:] : Thank you very much for allowing me to participate in the care of this patient.  If you have any questions, please do not hesitate to contact me. [Sincerely,] : Sincerely, [Please see my note below.] : Please see my note below. [FreeTextEntry3] : Cleve Saldivar MD\par Surgical Oncology\par [facility]\par Office: [phone]\par Cell: [phone]\par  [FreeTextEntry2] : Mandeep Conley MD  [FreeTextEntry1] : Alphonse is a pleasant 79 year-old male here for a postop visit, status post laparoscopic assisted resection of abdominal mass, small bowel resection, umbilical hernia repair and liver biopsy on 12/11/19. 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 There are 12 mitotic figures per 10 high power fields. He is feeling well and denies any significant pain, fever or chills. He is tolerating diet without nausea/vomiting and moving his bowels without difficulty. \par \par On exam, the incisions are healing well. \par \par Alphonse will follow up in 3  - 4 months. We discussed the need for medical oncology and radiation oncology evaluations. [DrEliseo ___] : Dr. MADRIGAL [DrEliseo  ___] : Dr. MADRIGAL

## 2020-04-16 ENCOUNTER — APPOINTMENT (OUTPATIENT)
Dept: SURGICAL ONCOLOGY | Facility: CLINIC | Age: 80
End: 2020-04-16

## 2021-04-02 NOTE — ED PROVIDER NOTE - [facility] ED SYMPTOMS POS
Patient due for follow up appointment. No answer & left message to return call for scheduling   WEAKNESS/slight altered mental status/COUGH

## 2021-06-10 ENCOUNTER — APPOINTMENT (OUTPATIENT)
Dept: SURGICAL ONCOLOGY | Facility: CLINIC | Age: 81
End: 2021-06-10
Payer: COMMERCIAL

## 2021-06-10 VITALS
DIASTOLIC BLOOD PRESSURE: 79 MMHG | BODY MASS INDEX: 24.95 KG/M2 | SYSTOLIC BLOOD PRESSURE: 157 MMHG | HEART RATE: 75 BPM | OXYGEN SATURATION: 96 % | TEMPERATURE: 98.2 F | WEIGHT: 205 LBS

## 2021-06-10 PROCEDURE — 99072 ADDL SUPL MATRL&STAF TM PHE: CPT

## 2021-06-10 PROCEDURE — 99214 OFFICE O/P EST MOD 30 MIN: CPT

## 2021-06-17 NOTE — PHYSICAL EXAM
[Normal] : supple, no neck mass and thyroid not enlarged [Normal Neck Lymph Nodes] : normal neck lymph nodes  [Normal Supraclavicular Lymph Nodes] : normal supraclavicular lymph nodes [Normal Groin Lymph Nodes] : normal groin lymph nodes [Normal Axillary Lymph Nodes] : normal axillary lymph nodes [Normal] : oriented to person, place and time, with appropriate affect [de-identified] : Reducible incisional hernia

## 2021-06-17 NOTE — ASSESSMENT
[FreeTextEntry1] : Alphonse will undergo a repeat CT in the near future.  He will contact me upon completion to discuss pertinent findings.

## 2021-06-17 NOTE — CONSULT LETTER
[Dear  ___] : Dear ~REGINO, [Consult Letter:] : I had the pleasure of evaluating your patient, [unfilled]. [Please see my note below.] : Please see my note below. [Consult Closing:] : Thank you very much for allowing me to participate in the care of this patient.  If you have any questions, please do not hesitate to contact me. [Sincerely,] : Sincerely, [FreeTextEntry2] : Mandeep Conley MD  [FreeTextEntry3] : Cleve Saldivar MD\par Surgical Oncology\par [facility]\par Office: [phone]\par Cell: [phone]\par

## 2021-06-17 NOTE — HISTORY OF PRESENT ILLNESS
[de-identified] : Alphonse is a pleasant 81 year-old male here for a follow up visit. He underwent a laparoscopic assisted resection of abdominal mass, small bowel resection, umbilical hernia repair and liver biopsy on 12/11/19. 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 There are 12 mitotic figures per 10 high power fields.\par \par Most recent imaging includes a CT A&P performed on 12/01/20, which revealed No findings to suggest recurrent leiomyosarcoma. New right paraumbilical hernia containing a mildly dilated loop of small bowel. Small hiatal hernia. \par \par Did he follow up with Med Onc?\par How is he feeling??\par \par Previous pertinent history is as follows:\par \par He was initially seen in consultation on 11/7/19. He experienced an UTI and was referred for an ultrasound per his urologist with suggestion of a mass. 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 Mesenteric vessels are seen around the mass. Suggested differentials include carcinoid, lymphoma or neurogenic tumor. A chest CT on 10/24/19 showed no evidence of metastatic disease. 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 \par \par Dr. Lozada performed an EGD and colonoscopy on May 2017 with benign findings including 3 colonic polyps. \par \par His past medical history includes DM II, hypertension, hyperlipidemia, DDD, BPH and hypothyroidism. Last year he was diagnosed with a liver abscess which was drained at [facility] and treated with antibiotics (patient was worked up by ID, ultimately felt to be secondary to food borne illness). Past surgical history includes a partial thyroidectomy in 1999 for a benign condition, and back surgery for spinal stenosis (hardware in place). His records indicate that he suffered a subarachnoid hemorrhage, but patient states he had slipped and fallen in 2014 and a CT of his brain showed a miniscule bleed. He denies any loss of consciousness at that time, but states that as part of syncope work up, cardiology implanted a cardiac monitoring device which was ultimately removed with no abnormal findings. He takes baby aspirin daily for prophylaxis but has stopped recently in anticipation of surgical procedure. He denies any family history of malignancies. He denies alcohol or tobacco use.

## 2021-06-21 ENCOUNTER — NON-APPOINTMENT (OUTPATIENT)
Age: 81
End: 2021-06-21

## 2021-12-29 ENCOUNTER — RESULT REVIEW (OUTPATIENT)
Age: 81
End: 2021-12-29

## 2022-01-13 ENCOUNTER — APPOINTMENT (OUTPATIENT)
Dept: SURGICAL ONCOLOGY | Facility: CLINIC | Age: 82
End: 2022-01-13
Payer: COMMERCIAL

## 2022-01-13 VITALS
OXYGEN SATURATION: 98 % | HEIGHT: 76 IN | SYSTOLIC BLOOD PRESSURE: 147 MMHG | DIASTOLIC BLOOD PRESSURE: 88 MMHG | TEMPERATURE: 98.1 F | BODY MASS INDEX: 26.79 KG/M2 | WEIGHT: 220 LBS | HEART RATE: 74 BPM

## 2022-01-13 PROCEDURE — 99214 OFFICE O/P EST MOD 30 MIN: CPT

## 2022-01-13 NOTE — ASSESSMENT
[FreeTextEntry1] : Alphonse will get re-staging PET CT.  We discussed the role of multimodal therapy, and the potential role of resection of isolated metastases in sarcoma.  I will be obtaining his recent MRI.  He and his family are also seeking additional opinions and possible a clinical trial in [place].

## 2022-01-13 NOTE — CONSULT LETTER
[Dear  ___] : Dear ~REGINO, [Consult Letter:] : I had the pleasure of evaluating your patient, [unfilled]. [Please see my note below.] : Please see my note below. [Consult Closing:] : Thank you very much for allowing me to participate in the care of this patient.  If you have any questions, please do not hesitate to contact me. [Sincerely,] : Sincerely, [FreeTextEntry2] : Mandeep Conley MD  [FreeTextEntry3] : Cleve Saldivar MD\par Surgical Oncology\par [facility]\par Office: [phone]\par Cell: [phone]\par

## 2022-01-13 NOTE — HISTORY OF PRESENT ILLNESS
[de-identified] : Alphonse is a pleasant 81 year-old male here for a follow up visit. He underwent a laparoscopic assisted resection of abdominal mass, small bowel resection, umbilical hernia repair and liver biopsy on 12/11/19. 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 There are 12 mitotic figures per 10 high power fields.\par \par CT A&P performed on 12/01/20 revealed No findings to suggest recurrent leiomyosarcoma. New right paraumbilical hernia containing a mildly dilated loop of small bowel. Small hiatal hernia. \par \par CT A&P 6/21/21 demonstrated hepatic steatosis, cholelithiasis, periumbilical hernia containing small bowel without evidence of obstruction.  Mild focal dilatation of the anastomotic small bowel loop measuring 4.3 cm without adjacent bowel wall thickening.\par \par CT A&P 12/7/21 demonstrated a 1.7 cm lesion in the liver, not seen on prior study which may be secondary to heterogeneous fat deposition.  Liver MRI with contrast is recommended for further evaluation.  Cholelithiasis, abdominal wall hernia containing nonobstructed loops of small bowel. \par \par MRI abdomen performed 12/16/21 revealed several scattered hepatic lesions measuring up to 2.2 cm, the larger of these demonstrating diffusion restriction and progressive enhancement which are indeterminate but suspicious, especially given interval progression and history of leiomyosarcoma (recommend PET/CT).  Cholelithiasis without acute cholecystitis.\par \par He has seen Dr. Conley recently and discussed pathology results. He is here today to discuss findings.\par \par Previous pertinent history is as follows:\par \par He was initially seen in consultation on 11/7/19. He experienced an UTI and was referred for an ultrasound per his urologist with suggestion of a mass. 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 Mesenteric vessels are seen around the mass. Suggested differentials include carcinoid, lymphoma or neurogenic tumor. A chest CT on 10/24/19 showed no evidence of metastatic disease. 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 \par \par Dr. Lozada performed an EGD and colonoscopy on May 2017 with benign findings including 3 colonic polyps. \par \par His past medical history includes DM II, hypertension, hyperlipidemia, DDD, BPH and hypothyroidism. Last year he was diagnosed with a liver abscess which was drained at [facility] and treated with antibiotics (patient was worked up by ID, ultimately felt to be secondary to food borne illness). Past surgical history includes a partial thyroidectomy in 1999 for a benign condition, and back surgery for spinal stenosis (hardware in place). His records indicate that he suffered a subarachnoid hemorrhage, but patient states he had slipped and fallen in 2014 and a CT of his brain showed a miniscule bleed. He denies any loss of consciousness at that time, but states that as part of syncope work up, cardiology implanted a cardiac monitoring device which was ultimately removed with no abnormal findings. He takes baby aspirin daily for prophylaxis but has stopped recently in anticipation of surgical procedure. He denies any family history of malignancies. He denies alcohol or tobacco use.

## 2022-01-13 NOTE — PHYSICAL EXAM
[Normal] : supple, no neck mass and thyroid not enlarged [Normal Neck Lymph Nodes] : normal neck lymph nodes  [Normal Supraclavicular Lymph Nodes] : normal supraclavicular lymph nodes [Normal Groin Lymph Nodes] : normal groin lymph nodes [Normal Axillary Lymph Nodes] : normal axillary lymph nodes [Normal] : oriented to person, place and time, with appropriate affect [de-identified] : Reducible incisional hernia.  No palpable masses

## 2022-01-21 ENCOUNTER — APPOINTMENT (OUTPATIENT)
Dept: NUCLEAR MEDICINE | Facility: IMAGING CENTER | Age: 82
End: 2022-01-21
Payer: COMMERCIAL

## 2022-01-21 ENCOUNTER — OUTPATIENT (OUTPATIENT)
Dept: OUTPATIENT SERVICES | Facility: HOSPITAL | Age: 82
LOS: 1 days | End: 2022-01-21
Payer: COMMERCIAL

## 2022-01-21 DIAGNOSIS — E89.0 POSTPROCEDURAL HYPOTHYROIDISM: Chronic | ICD-10-CM

## 2022-01-21 DIAGNOSIS — Z98.890 OTHER SPECIFIED POSTPROCEDURAL STATES: Chronic | ICD-10-CM

## 2022-01-21 DIAGNOSIS — C48.1 MALIGNANT NEOPLASM OF SPECIFIED PARTS OF PERITONEUM: ICD-10-CM

## 2022-01-21 PROCEDURE — 78815 PET IMAGE W/CT SKULL-THIGH: CPT

## 2022-01-21 PROCEDURE — 78815 PET IMAGE W/CT SKULL-THIGH: CPT | Mod: 26,PS,MH

## 2022-01-21 PROCEDURE — A9552: CPT

## 2022-01-27 ENCOUNTER — NON-APPOINTMENT (OUTPATIENT)
Age: 82
End: 2022-01-27

## 2022-03-04 ENCOUNTER — APPOINTMENT (OUTPATIENT)
Dept: MRI IMAGING | Facility: CLINIC | Age: 82
End: 2022-03-04

## 2023-03-13 PROBLEM — C48.1: Status: ACTIVE | Noted: 2019-11-07

## 2023-03-15 ENCOUNTER — INPATIENT (INPATIENT)
Facility: HOSPITAL | Age: 83
LOS: 3 days | Discharge: HOME | DRG: 354 | End: 2023-03-19
Attending: SURGERY | Admitting: SURGERY
Payer: COMMERCIAL

## 2023-03-15 VITALS
WEIGHT: 205.03 LBS | RESPIRATION RATE: 16 BRPM | HEIGHT: 76 IN | TEMPERATURE: 98 F | HEART RATE: 98 BPM | OXYGEN SATURATION: 96 % | SYSTOLIC BLOOD PRESSURE: 114 MMHG | DIASTOLIC BLOOD PRESSURE: 71 MMHG

## 2023-03-15 DIAGNOSIS — Z98.890 OTHER SPECIFIED POSTPROCEDURAL STATES: Chronic | ICD-10-CM

## 2023-03-15 DIAGNOSIS — K56.609 UNSPECIFIED INTESTINAL OBSTRUCTION, UNSPECIFIED AS TO PARTIAL VERSUS COMPLETE OBSTRUCTION: ICD-10-CM

## 2023-03-15 DIAGNOSIS — E89.0 POSTPROCEDURAL HYPOTHYROIDISM: Chronic | ICD-10-CM

## 2023-03-15 LAB
ALBUMIN SERPL ELPH-MCNC: 4.2 G/DL — SIGNIFICANT CHANGE UP (ref 3.3–5)
ALP SERPL-CCNC: 129 U/L — HIGH (ref 40–120)
ALT FLD-CCNC: 32 U/L — SIGNIFICANT CHANGE UP (ref 10–45)
ANION GAP SERPL CALC-SCNC: 14 MMOL/L — SIGNIFICANT CHANGE UP (ref 5–17)
APTT BLD: 27.5 SEC — SIGNIFICANT CHANGE UP (ref 27.5–35.5)
AST SERPL-CCNC: 28 U/L — SIGNIFICANT CHANGE UP (ref 10–40)
BASE EXCESS BLDV CALC-SCNC: 1.2 MMOL/L — SIGNIFICANT CHANGE UP (ref -2–3)
BASOPHILS # BLD AUTO: 0.01 K/UL — SIGNIFICANT CHANGE UP (ref 0–0.2)
BASOPHILS NFR BLD AUTO: 0.1 % — SIGNIFICANT CHANGE UP (ref 0–2)
BILIRUB SERPL-MCNC: 1.2 MG/DL — SIGNIFICANT CHANGE UP (ref 0.2–1.2)
BLD GP AB SCN SERPL QL: NEGATIVE — SIGNIFICANT CHANGE UP
BUN SERPL-MCNC: 23 MG/DL — SIGNIFICANT CHANGE UP (ref 7–23)
CA-I SERPL-SCNC: 1.14 MMOL/L — LOW (ref 1.15–1.33)
CALCIUM SERPL-MCNC: 8.9 MG/DL — SIGNIFICANT CHANGE UP (ref 8.4–10.5)
CHLORIDE BLDV-SCNC: 89 MMOL/L — LOW (ref 96–108)
CHLORIDE SERPL-SCNC: 90 MMOL/L — LOW (ref 96–108)
CO2 BLDV-SCNC: 29 MMOL/L — HIGH (ref 22–26)
CO2 SERPL-SCNC: 25 MMOL/L — SIGNIFICANT CHANGE UP (ref 22–31)
CREAT SERPL-MCNC: 0.88 MG/DL — SIGNIFICANT CHANGE UP (ref 0.5–1.3)
EGFR: 85 ML/MIN/1.73M2 — SIGNIFICANT CHANGE UP
EOSINOPHIL # BLD AUTO: 0 K/UL — SIGNIFICANT CHANGE UP (ref 0–0.5)
EOSINOPHIL NFR BLD AUTO: 0 % — SIGNIFICANT CHANGE UP (ref 0–6)
FLUAV AG NPH QL: SIGNIFICANT CHANGE UP
FLUBV AG NPH QL: SIGNIFICANT CHANGE UP
GAS PNL BLDV: 124 MMOL/L — LOW (ref 136–145)
GAS PNL BLDV: SIGNIFICANT CHANGE UP
GAS PNL BLDV: SIGNIFICANT CHANGE UP
GLUCOSE BLDC GLUCOMTR-MCNC: 94 MG/DL — SIGNIFICANT CHANGE UP (ref 70–99)
GLUCOSE BLDV-MCNC: 134 MG/DL — HIGH (ref 70–99)
GLUCOSE SERPL-MCNC: 116 MG/DL — HIGH (ref 70–99)
GLUCOSE SERPL-MCNC: 141 MG/DL — HIGH (ref 70–99)
HCO3 BLDV-SCNC: 27 MMOL/L — SIGNIFICANT CHANGE UP (ref 22–29)
HCT VFR BLD CALC: 42.3 % — SIGNIFICANT CHANGE UP (ref 39–50)
HCT VFR BLDA CALC: 43 % — SIGNIFICANT CHANGE UP (ref 39–51)
HGB BLD CALC-MCNC: 14.4 G/DL — SIGNIFICANT CHANGE UP (ref 12.6–17.4)
HGB BLD-MCNC: 13.9 G/DL — SIGNIFICANT CHANGE UP (ref 13–17)
IMM GRANULOCYTES NFR BLD AUTO: 0.4 % — SIGNIFICANT CHANGE UP (ref 0–0.9)
INR BLD: 1.17 RATIO — HIGH (ref 0.88–1.16)
LACTATE BLDV-MCNC: 2.3 MMOL/L — HIGH (ref 0.5–2)
LIDOCAIN IGE QN: 21 U/L — SIGNIFICANT CHANGE UP (ref 7–60)
LYMPHOCYTES # BLD AUTO: 0.87 K/UL — LOW (ref 1–3.3)
LYMPHOCYTES # BLD AUTO: 9.3 % — LOW (ref 13–44)
MCHC RBC-ENTMCNC: 27.3 PG — SIGNIFICANT CHANGE UP (ref 27–34)
MCHC RBC-ENTMCNC: 32.9 GM/DL — SIGNIFICANT CHANGE UP (ref 32–36)
MCV RBC AUTO: 82.9 FL — SIGNIFICANT CHANGE UP (ref 80–100)
MONOCYTES # BLD AUTO: 0.66 K/UL — SIGNIFICANT CHANGE UP (ref 0–0.9)
MONOCYTES NFR BLD AUTO: 7 % — SIGNIFICANT CHANGE UP (ref 2–14)
NEUTROPHILS # BLD AUTO: 7.8 K/UL — HIGH (ref 1.8–7.4)
NEUTROPHILS NFR BLD AUTO: 83.2 % — HIGH (ref 43–77)
NRBC # BLD: 0 /100 WBCS — SIGNIFICANT CHANGE UP (ref 0–0)
PCO2 BLDV: 47 MMHG — SIGNIFICANT CHANGE UP (ref 42–55)
PH BLDV: 7.37 — SIGNIFICANT CHANGE UP (ref 7.32–7.43)
PLATELET # BLD AUTO: 331 K/UL — SIGNIFICANT CHANGE UP (ref 150–400)
PO2 BLDV: 31 MMHG — SIGNIFICANT CHANGE UP (ref 25–45)
POTASSIUM BLDV-SCNC: 4.4 MMOL/L — SIGNIFICANT CHANGE UP (ref 3.5–5.1)
POTASSIUM SERPL-MCNC: 4.3 MMOL/L — SIGNIFICANT CHANGE UP (ref 3.5–5.3)
POTASSIUM SERPL-SCNC: 4.3 MMOL/L — SIGNIFICANT CHANGE UP (ref 3.5–5.3)
PROT SERPL-MCNC: 7.1 G/DL — SIGNIFICANT CHANGE UP (ref 6–8.3)
PROTHROM AB SERPL-ACNC: 13.6 SEC — HIGH (ref 10.5–13.4)
RBC # BLD: 5.1 M/UL — SIGNIFICANT CHANGE UP (ref 4.2–5.8)
RBC # FLD: 13.8 % — SIGNIFICANT CHANGE UP (ref 10.3–14.5)
RH IG SCN BLD-IMP: NEGATIVE — SIGNIFICANT CHANGE UP
RSV RNA NPH QL NAA+NON-PROBE: SIGNIFICANT CHANGE UP
SAO2 % BLDV: 52.1 % — LOW (ref 67–88)
SARS-COV-2 RNA SPEC QL NAA+PROBE: SIGNIFICANT CHANGE UP
SODIUM SERPL-SCNC: 129 MMOL/L — LOW (ref 135–145)
TROPONIN T, HIGH SENSITIVITY RESULT: 23 NG/L — SIGNIFICANT CHANGE UP (ref 0–51)
WBC # BLD: 9.38 K/UL — SIGNIFICANT CHANGE UP (ref 3.8–10.5)
WBC # FLD AUTO: 9.38 K/UL — SIGNIFICANT CHANGE UP (ref 3.8–10.5)

## 2023-03-15 PROCEDURE — 99222 1ST HOSP IP/OBS MODERATE 55: CPT | Mod: GC

## 2023-03-15 PROCEDURE — 99285 EMERGENCY DEPT VISIT HI MDM: CPT

## 2023-03-15 PROCEDURE — 74177 CT ABD & PELVIS W/CONTRAST: CPT | Mod: 26,MA

## 2023-03-15 RX ORDER — LOSARTAN POTASSIUM 100 MG/1
50 TABLET, FILM COATED ORAL DAILY
Refills: 0 | Status: DISCONTINUED | OUTPATIENT
Start: 2023-03-15 | End: 2023-03-19

## 2023-03-15 RX ORDER — ENOXAPARIN SODIUM 100 MG/ML
40 INJECTION SUBCUTANEOUS EVERY 24 HOURS
Refills: 0 | Status: DISCONTINUED | OUTPATIENT
Start: 2023-03-15 | End: 2023-03-19

## 2023-03-15 RX ORDER — LEVOTHYROXINE SODIUM 125 MCG
125 TABLET ORAL DAILY
Refills: 0 | Status: DISCONTINUED | OUTPATIENT
Start: 2023-03-15 | End: 2023-03-19

## 2023-03-15 RX ORDER — GLUCAGON INJECTION, SOLUTION 0.5 MG/.1ML
1 INJECTION, SOLUTION SUBCUTANEOUS ONCE
Refills: 0 | Status: DISCONTINUED | OUTPATIENT
Start: 2023-03-15 | End: 2023-03-17

## 2023-03-15 RX ORDER — PANTOPRAZOLE SODIUM 20 MG/1
40 TABLET, DELAYED RELEASE ORAL
Refills: 0 | Status: DISCONTINUED | OUTPATIENT
Start: 2023-03-15 | End: 2023-03-19

## 2023-03-15 RX ORDER — INSULIN LISPRO 100/ML
VIAL (ML) SUBCUTANEOUS EVERY 6 HOURS
Refills: 0 | Status: DISCONTINUED | OUTPATIENT
Start: 2023-03-15 | End: 2023-03-16

## 2023-03-15 RX ORDER — SODIUM CHLORIDE 9 MG/ML
1000 INJECTION INTRAMUSCULAR; INTRAVENOUS; SUBCUTANEOUS ONCE
Refills: 0 | Status: COMPLETED | OUTPATIENT
Start: 2023-03-15 | End: 2023-03-15

## 2023-03-15 RX ORDER — FINASTERIDE 5 MG/1
5 TABLET, FILM COATED ORAL DAILY
Refills: 0 | Status: DISCONTINUED | OUTPATIENT
Start: 2023-03-15 | End: 2023-03-19

## 2023-03-15 RX ORDER — DEXTROSE 50 % IN WATER 50 %
25 SYRINGE (ML) INTRAVENOUS ONCE
Refills: 0 | Status: DISCONTINUED | OUTPATIENT
Start: 2023-03-15 | End: 2023-03-17

## 2023-03-15 RX ORDER — DEXTROSE 50 % IN WATER 50 %
15 SYRINGE (ML) INTRAVENOUS ONCE
Refills: 0 | Status: DISCONTINUED | OUTPATIENT
Start: 2023-03-15 | End: 2023-03-17

## 2023-03-15 RX ORDER — SODIUM CHLORIDE 9 MG/ML
1000 INJECTION INTRAMUSCULAR; INTRAVENOUS; SUBCUTANEOUS
Refills: 0 | Status: DISCONTINUED | OUTPATIENT
Start: 2023-03-15 | End: 2023-03-17

## 2023-03-15 RX ORDER — TAMSULOSIN HYDROCHLORIDE 0.4 MG/1
0.4 CAPSULE ORAL AT BEDTIME
Refills: 0 | Status: DISCONTINUED | OUTPATIENT
Start: 2023-03-15 | End: 2023-03-19

## 2023-03-15 RX ORDER — SODIUM CHLORIDE 9 MG/ML
1000 INJECTION, SOLUTION INTRAVENOUS
Refills: 0 | Status: DISCONTINUED | OUTPATIENT
Start: 2023-03-15 | End: 2023-03-17

## 2023-03-15 RX ORDER — DEXTROSE 50 % IN WATER 50 %
12.5 SYRINGE (ML) INTRAVENOUS ONCE
Refills: 0 | Status: DISCONTINUED | OUTPATIENT
Start: 2023-03-15 | End: 2023-03-17

## 2023-03-15 RX ADMIN — SODIUM CHLORIDE 75 MILLILITER(S): 9 INJECTION INTRAMUSCULAR; INTRAVENOUS; SUBCUTANEOUS at 19:37

## 2023-03-15 RX ADMIN — SODIUM CHLORIDE 1000 MILLILITER(S): 9 INJECTION INTRAMUSCULAR; INTRAVENOUS; SUBCUTANEOUS at 13:49

## 2023-03-15 RX ADMIN — TAMSULOSIN HYDROCHLORIDE 0.4 MILLIGRAM(S): 0.4 CAPSULE ORAL at 22:10

## 2023-03-15 NOTE — H&P ADULT - ASSESSMENT
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  Surgery consulted for management of SBO with TP within ventral hernia sac. In the ED AVSS, lab workup remarkable for hyponatremia at 129 and lactate 2.3.     - Admit to Surgery under Dr. Saldivar  - NPO  - Will defer NGT for now, but will need NGT if becomes nauseated or vomiting  - IVF  - F/U AM labs  - Monitor urine output  - Serial abdominal exams  - Abdominal exam prior to pain medications  - F/U lactate  - Plan discussed and agreeable with Surgical Oncology attending, Dr. Saldivar    Red Team Surgery  [id number]

## 2023-03-15 NOTE — ED PROVIDER NOTE - PHYSICAL EXAMINATION
GEN: Pt in NAD, non-toxic, A&O x3.  PSYCH: Affect appropriate.  EYES: Sclera white w/o injection.   ENT: Head NCAT. MMM. Neck supple FROM.  RESP: CTA b/l, no wheezes, rales, or rhonchi.   CARDIAC: RRR, clear distinct S1, S2, no appreciable murmurs.  ABD: Abdomen soft, large umbilical hernia, not easily reducible, mild tenderness to palpation. No overlying skin changes.  VASC: 2+ radial and dorsalis pedis pulses b/l. No edema or calf tenderness.  SKIN: No rashes on the trunk.

## 2023-03-15 NOTE — ED ADULT NURSE NOTE - NSIMPLEMENTINTERV_GEN_ALL_ED
Implemented All Fall Risk Interventions:  [place] to call system. Call bell, personal items and telephone within reach. Instruct patient to call for assistance. Room bathroom lighting operational. Non-slip footwear when patient is off stretcher. Physically safe environment: no spills, clutter or unnecessary equipment. Stretcher in lowest position, wheels locked, appropriate side rails in place. Provide visual cue, wrist band, yellow gown, etc. Monitor gait and stability. Monitor for mental status changes and reorient to person, place, and time. Review medications for side effects contributing to fall risk. Reinforce activity limits and safety measures with patient and family.

## 2023-03-15 NOTE — ED PROVIDER NOTE - CLINICAL SUMMARY MEDICAL DECISION MAKING FREE TEXT BOX
Bryson Tabares MD (Attending Physician): 83M with history of leiomyosarcoma of abdomen s/p removal in 2019 c/b hernia and lesions in liver s/p embolization in 2022 presents for worsening periumbilical pain likely due to hernia. Patient reports that he normally has dull burning pain that is intermittent however over the past week it has become more intense and constant. He also reports hiccupping and then has had 3 episodes of nbnb emesis in 1 week. Last episode this AM. Passing flatus. Denies fever, chills, cp, sob, constipation, diarrhea, nausea, vomiting. On exam, VSS. Well appearing. CVS RRR, Lungs CTAB. Abd: +periumbilical hernia which is not reducible (chronic) with area or erythema and tenderness. All other areas of abdomen are soft and nontender. MSK: moves all extremities, no pitting edema. Differentials include but are not limited to: strangulated hernia, incarcerated hernia, colitis, perforation, constipation, BSO, ileus. Hiccupping is new therefore cannot rule out ACS, GERD, reflux, esophagitis and will evaluate with cardac labs, lipase, ECG, and CT.     Surgeon: Dr Hernandez

## 2023-03-15 NOTE — H&P ADULT - ATTENDING COMMENTS
Admit for PSBO, likely resolving.     Has had recurrent sx's prior to coming to hospital    NPO, IVF    Serial exams

## 2023-03-15 NOTE — ED PROVIDER NOTE - PROGRESS NOTE DETAILS
CT shows SBO, d/w surgery resident, preoperative labs ordered. results d/w pt, all questions answered. -Carter Arnold PA-C Ivana attg: pt with bowel obstruction seen on CT, surgery consulted and seen by pt. to be admitted.

## 2023-03-15 NOTE — H&P ADULT - HISTORY OF PRESENT ILLNESS
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  Patient reports he vomited after dinner 1.5 weeks ago, and since has been experiencing abdominal pain around the area of his hernia.  Patient reports he has a chronic sense of discomfort in the area, however since this episode of vomiting has been having waxing and waning bouts of more severe pain.  Patient vomited last night after dinner, and then vomited again this morning prompting  him to seek further care in the ED.  Patient denies nausea at this time and is passing flatus, last BM was earlier today.  Patient denies fevers, chills, urinary complaints, other abdominal pain, chest pain, shortness of breath.      Surgery consulted for management of SBO.    In the ED AVSS, lab workup remarkable for hyponatremia at 129 and lactate 2.3    CT scan abdomen/pelvis showing small bowel obstruction with transition point within patient's known ventral hernia sac. No evidence of closed loop. No overt evidence of ischemia or perforation. Indeterminate hepatic hypodensities within the right hepatic lobe appear grossly unchanged when compared to prior MR from outside institution 12/16/2021    Vital Signs Last 24 Hrs  T(C): 36.4 (15 Mar 2023 13:01), Max: 36.4 (15 Mar 2023 11:57)  T(F): 97.6 (15 Mar 2023 13:01), Max: 97.6 (15 Mar 2023 13:01)  HR: 92 (15 Mar 2023 13:01) (92 - 98)  BP: 114/65 (15 Mar 2023 13:01) (114/65 - 114/71)  BP(mean): --  RR: 16 (15 Mar 2023 13:01) (16 - 16)  SpO2: 97% (15 Mar 2023 13:01) (96% - 97%)    Parameters below as of 15 Mar 2023 13:01  Patient On (Oxygen Delivery Method): room air

## 2023-03-15 NOTE — H&P ADULT - NSHPPHYSICALEXAM_GEN_ALL_CORE
Physical exam  General: NAD  Cardiac: Regular rate  Respiratory: Nonlabored breathing  Abdominal Exam: soft, nondistended, tender over umbilical hernia, reducible, no rebound, no guarding  Muskuloskeletal: Moves all 4 extremities spontaneously  Neurological: Alert and oriented, no gross focal deficits, no motor or sensory deficits.  Psych: AOX3

## 2023-03-15 NOTE — PATIENT PROFILE ADULT - FALL HARM RISK - RISK INTERVENTIONS

## 2023-03-15 NOTE — ED ADULT NURSE REASSESSMENT NOTE - NS ED NURSE REASSESS COMMENT FT1
pt resting comfortably in stretcher, no acute distress, denies complaints at this time, pending CT results

## 2023-03-15 NOTE — ED ADULT NURSE NOTE - OBJECTIVE STATEMENT
pt is an 82yo male PMH liver CA, HTN, HLD, DM, multiple abd surgeries, hernia presenting to the ED complaining of abdominal pain. pt states he had an episode of vomiting following dinner last night, another episode of vomiting reported this AM. pt states he went to urgent care and was told to come to ED, pt endorsing RLQ pain with radiation into the periumbilical area, pt has hernia noted to the umbilicus. pt endorsing loose, pale colored stools for the past 2 days as well. pt states he has been experiencing episodes of hiccups that sometimes precipitate vomiting. pt A&Ox3 gross neuro intact, no difficulty speaking in complete sentences, s1s2 heart sounds heard, pulses x 4, ruiz x4, abdomen soft, RLQ tender to palpation, abdomen nondistended, skin intact. pt denies chest pain, sob, ha, f/c, urinary symptoms, hematuria

## 2023-03-15 NOTE — ED PROVIDER NOTE - OBJECTIVE STATEMENT
83-year-old male past medical history of abdominal leiomyosarcoma status post resection with resultant um hypothyroidism bilical/ventral hernia,, BPH, GERD, HTN, DM presents to the ED complaining of vomiting and abdominal pain x1.5 weeks.  Patient reports he vomited after dinner 1.5 weeks ago, and since has been experiencing abdominal pain around the area of his hernia.  Patient reports he has a chronic sense of discomfort in the area, however since this episode of vomiting has been having waxing and waning bouts of more severe pain.  Patient been monitoring his symptoms, and last night vomited after dinner, and then vomited again this morning prompting him to speak to his primary care who advised him to go to the urgent care where he was referred again to the emergency department.  Patient reports he has a follow-up with his surgeon tomorrow.  Patient's daughter is at bedside providing additional history and corroborating patient's story.  Patient denies nausea at this time, denies change in his bowel movements.  Patient reports he is passing flatus.  Patient denies fevers, chills, urinary complaints, other abdominal pain, chest pain, shortness of breath.  Surgery- Dr. Saldivar

## 2023-03-15 NOTE — H&P ADULT - NSHPLABSRESULTS_GEN_ALL_CORE
LABS:                           13.9   9.38  )-----------( 331      ( 15 Mar 2023 13:49 )             42.3     03-15    129<L>  |  90<L>  |  23  ----------------------------<  141<H>  4.3   |  25  |  0.88    Ca    8.9      15 Mar 2023 13:49    TPro  7.1  /  Alb  4.2  /  TBili  1.2  /  DBili  x   /  AST  28  /  ALT  32  /  AlkPhos  129<H>  03-15          ACC: [id number] EXAM:  CT ABDOMEN AND PELVIS IC   ORDERED BY: ARINA RAMIREZ     PROCEDURE DATE:  03/15/2023          INTERPRETATION:  CLINICAL INFORMATION: Vomiting and abdominal pain.   Patient with a history of abdominal leiomyosarcoma status post resection   with resultant umbilical hernia/ventral hernia.    COMPARISON: CT abdomen pelvis 12/7/2021, CT abdomen pelvis 4/5/2018.   PET/CT 1/21/2022, MRI of the abdomen from outside institution 12/16/2021.    CONTRAST/COMPLICATIONS:  IV Contrast: Omnipaque 350  90 cc administered   10 cc discarded  Oral Contrast: NONE  Complications: None reported at time of study completion    PROCEDURE:  CT of the Abdomen and Pelvis was performed.  Sagittal and coronal reformats were performed.    FINDINGS:  LOWER CHEST: Bibasilar atelectasis.    LIVER: Indeterminate hepatic hypodensities within the right hepatic lobe   appear grossly unchanged when compared to prior outside MR from   12/16/2021 and were not visualized on PET/CT from 1/21/2022.  BILE DUCTS: Normal caliber.  GALLBLADDER: Cholelithiasis.  SPLEEN: Within normal limits.  PANCREAS: Within normal limits.  ADRENALS: Within normal limits.  KIDNEYS/URETERS: Right renal hypodensities too small to characterize.    BLADDER: Within normal limits.  REPRODUCTIVE ORGANS: Prostate is enlarged with coarse internal   calcifications.    BOWEL: Small hiatal hernia. Dilated loops of small bowel, most notable in   the region of the surgical anastomosis, with transition point within   patient's known ventral hernia sac. Decompressed loops of small bowel   distal to the point of transition with otherwise air-filled colon. No   evidence of closed loop. No overt evidence of ischemia or perforation.   Colonic diverticulosis without diverticulitis. Appendix is normal.  PERITONEUM: No ascites. No free air.  VESSELS: Atherosclerotic changes.  RETROPERITONEUM/LYMPH NODES: No lymphadenopathy.  ABDOMINAL WALL: Fat-containing, obstructed bowel containing ventral   hernia as described above.  BONES: Status post prior L4-L5 laminectomy with posterior spinal fusion.   Degenerative changes.    IMPRESSION:  Small bowel obstruction with transition point within patient's known   ventral hernia sac. No evidence of closed loop. No overt evidence of   ischemiaor perforation.    Indeterminate hepatic hypodensities within the right hepatic lobe appear   grossly unchanged when compared to prior MR from outside institution   12/16/2021        --- End of Report ---           MATI BENSON MD; Resident Radiologist  This document has been electronically signed.  RAJENDRA MIRAMONTES MD; Attending Radiologist  This document has been electronically signed. Mar 15 2023  4:42PM

## 2023-03-15 NOTE — ED PROVIDER NOTE - ATTENDING APP SHARED VISIT CONTRIBUTION OF CARE
I, Bryson Tabares, performed a history and physical exam of the patient and discussed their management with the advanced care provider. I reviewed the note and agree with the documented findings and plan of care. I was present and available for all procedures.    ---    Please see MDM

## 2023-03-16 ENCOUNTER — TRANSCRIPTION ENCOUNTER (OUTPATIENT)
Age: 83
End: 2023-03-16

## 2023-03-16 ENCOUNTER — APPOINTMENT (OUTPATIENT)
Dept: SURGICAL ONCOLOGY | Facility: CLINIC | Age: 83
End: 2023-03-16

## 2023-03-16 DIAGNOSIS — C48.1 MALIGNANT NEOPLASM OF SPECIFIED PARTS OF PERITONEUM: ICD-10-CM

## 2023-03-16 LAB
A1C WITH ESTIMATED AVERAGE GLUCOSE RESULT: 6.8 % — HIGH (ref 4–5.6)
ALBUMIN SERPL ELPH-MCNC: 3.6 G/DL — SIGNIFICANT CHANGE UP (ref 3.3–5)
ALP SERPL-CCNC: 109 U/L — SIGNIFICANT CHANGE UP (ref 40–120)
ALT FLD-CCNC: 25 U/L — SIGNIFICANT CHANGE UP (ref 10–45)
ANION GAP SERPL CALC-SCNC: 12 MMOL/L — SIGNIFICANT CHANGE UP (ref 5–17)
APTT BLD: 28.4 SEC — SIGNIFICANT CHANGE UP (ref 27.5–35.5)
AST SERPL-CCNC: 22 U/L — SIGNIFICANT CHANGE UP (ref 10–40)
BILIRUB SERPL-MCNC: 0.7 MG/DL — SIGNIFICANT CHANGE UP (ref 0.2–1.2)
BUN SERPL-MCNC: 17 MG/DL — SIGNIFICANT CHANGE UP (ref 7–23)
CALCIUM SERPL-MCNC: 8.6 MG/DL — SIGNIFICANT CHANGE UP (ref 8.4–10.5)
CHLORIDE SERPL-SCNC: 95 MMOL/L — LOW (ref 96–108)
CO2 SERPL-SCNC: 23 MMOL/L — SIGNIFICANT CHANGE UP (ref 22–31)
CREAT SERPL-MCNC: 0.77 MG/DL — SIGNIFICANT CHANGE UP (ref 0.5–1.3)
EGFR: 89 ML/MIN/1.73M2 — SIGNIFICANT CHANGE UP
ESTIMATED AVERAGE GLUCOSE: 148 MG/DL — HIGH (ref 68–114)
GLUCOSE BLDC GLUCOMTR-MCNC: 102 MG/DL — HIGH (ref 70–99)
GLUCOSE BLDC GLUCOMTR-MCNC: 129 MG/DL — HIGH (ref 70–99)
GLUCOSE BLDC GLUCOMTR-MCNC: 83 MG/DL — SIGNIFICANT CHANGE UP (ref 70–99)
GLUCOSE BLDC GLUCOMTR-MCNC: 84 MG/DL — SIGNIFICANT CHANGE UP (ref 70–99)
GLUCOSE BLDC GLUCOMTR-MCNC: 93 MG/DL — SIGNIFICANT CHANGE UP (ref 70–99)
GLUCOSE SERPL-MCNC: 92 MG/DL — SIGNIFICANT CHANGE UP (ref 70–99)
HCT VFR BLD CALC: 38.5 % — LOW (ref 39–50)
HGB BLD-MCNC: 12.2 G/DL — LOW (ref 13–17)
INR BLD: 1.09 RATIO — SIGNIFICANT CHANGE UP (ref 0.88–1.16)
MCHC RBC-ENTMCNC: 26.7 PG — LOW (ref 27–34)
MCHC RBC-ENTMCNC: 31.7 GM/DL — LOW (ref 32–36)
MCV RBC AUTO: 84.2 FL — SIGNIFICANT CHANGE UP (ref 80–100)
NRBC # BLD: 0 /100 WBCS — SIGNIFICANT CHANGE UP (ref 0–0)
PLATELET # BLD AUTO: 242 K/UL — SIGNIFICANT CHANGE UP (ref 150–400)
POTASSIUM SERPL-MCNC: 3.5 MMOL/L — SIGNIFICANT CHANGE UP (ref 3.5–5.3)
POTASSIUM SERPL-SCNC: 3.5 MMOL/L — SIGNIFICANT CHANGE UP (ref 3.5–5.3)
PROT SERPL-MCNC: 6.2 G/DL — SIGNIFICANT CHANGE UP (ref 6–8.3)
PROTHROM AB SERPL-ACNC: 12.6 SEC — SIGNIFICANT CHANGE UP (ref 10.5–13.4)
RBC # BLD: 4.57 M/UL — SIGNIFICANT CHANGE UP (ref 4.2–5.8)
RBC # FLD: 14.1 % — SIGNIFICANT CHANGE UP (ref 10.3–14.5)
SODIUM SERPL-SCNC: 130 MMOL/L — LOW (ref 135–145)
WBC # BLD: 6.2 K/UL — SIGNIFICANT CHANGE UP (ref 3.8–10.5)
WBC # FLD AUTO: 6.2 K/UL — SIGNIFICANT CHANGE UP (ref 3.8–10.5)

## 2023-03-16 RX ORDER — POTASSIUM CHLORIDE 20 MEQ
10 PACKET (EA) ORAL
Refills: 0 | Status: COMPLETED | OUTPATIENT
Start: 2023-03-16 | End: 2023-03-16

## 2023-03-16 RX ORDER — INSULIN LISPRO 100/ML
VIAL (ML) SUBCUTANEOUS
Refills: 0 | Status: DISCONTINUED | OUTPATIENT
Start: 2023-03-16 | End: 2023-03-17

## 2023-03-16 RX ADMIN — FINASTERIDE 5 MILLIGRAM(S): 5 TABLET, FILM COATED ORAL at 12:00

## 2023-03-16 RX ADMIN — Medication 125 MICROGRAM(S): at 05:34

## 2023-03-16 RX ADMIN — Medication 100 MILLIEQUIVALENT(S): at 15:40

## 2023-03-16 RX ADMIN — PANTOPRAZOLE SODIUM 40 MILLIGRAM(S): 20 TABLET, DELAYED RELEASE ORAL at 07:57

## 2023-03-16 RX ADMIN — Medication 100 MILLIEQUIVALENT(S): at 13:08

## 2023-03-16 RX ADMIN — SODIUM CHLORIDE 50 MILLILITER(S): 9 INJECTION INTRAMUSCULAR; INTRAVENOUS; SUBCUTANEOUS at 09:04

## 2023-03-16 RX ADMIN — TAMSULOSIN HYDROCHLORIDE 0.4 MILLIGRAM(S): 0.4 CAPSULE ORAL at 22:00

## 2023-03-16 RX ADMIN — LOSARTAN POTASSIUM 50 MILLIGRAM(S): 100 TABLET, FILM COATED ORAL at 05:38

## 2023-03-16 RX ADMIN — ENOXAPARIN SODIUM 40 MILLIGRAM(S): 100 INJECTION SUBCUTANEOUS at 05:34

## 2023-03-16 RX ADMIN — Medication 100 MILLIEQUIVALENT(S): at 09:04

## 2023-03-16 NOTE — PHYSICAL THERAPY INITIAL EVALUATION ADULT - PERTINENT HX OF CURRENT PROBLEM, REHAB EVAL
84y/o M PSHx of abdominal leiomyosarcoma status post resection, SBR, small bowel anastomosis, hypothyroidism, BPH, GERD, HTN, DM presents to the ED complaining of intermittent vomiting and abdominal pain x1.5 weeks.  Pt reports he vomited after dinner 1.5 weeks ago, and since has been experiencing abdominal pain around the area of his hernia.  Pt reports he has a chronic sense of discomfort in the area, however since this episode of vomiting has been having waxing and waning bouts of more severe pain.  Pt vomited last night after dinner, and then vomited again this morning prompting  him to seek further care in the ED.  Pt denies nausea at this time and is passing flatus, last BM was earlier today.  Pt denies fevers, chills, urinary complaints, other abdominal pain, chest pain, shortness of breath.Surgery consulted for management of SBO.CT scan abdomen/pelvis showing small bowel obstruction with transition point within patient's known ventral hernia sac. No evidence of closed loop. No overt evidence of ischemia or perforation. Indeterminate hepatic hypodensities within the right hepatic lobe appear grossly unchanged when compared to prior MR from outside institution 12/16/2021

## 2023-03-16 NOTE — PHYSICAL THERAPY INITIAL EVALUATION ADULT - ADDITIONAL COMMENTS
Pt lives with mother and son in private home; 3 steps to enter, first floor set-up; independent prior to admission; uses a straight cane at times; pt states 2 falls in the past year in the garden (+trips)

## 2023-03-16 NOTE — PROGRESS NOTE ADULT - ATTENDING COMMENTS
Pt with a h/o metastatic leiyomyosarcoma.  rx'd with chemo and liver embolization at [facility], dz burden appears stable.  Has a symptomatic incisional hernia w transition in the hernia sac itself.  Pt states he has been having abdominal pain episodes over past several weeks    Clinically resolving -- on clears and (+) BMs today    Given ongoing symtoms - will plan to repair hernia tomorrow    Will need Medical Clearance    Preop     Will obtain records from [facility]    D/w pt and his wife.

## 2023-03-16 NOTE — CONSULT NOTE ADULT - ASSESSMENT
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 Admitted for SBO. medicine consult for co management.    # SBO with TP within ventral hernia sac.  CLD then NPO after MN for hernia repair  medicine pre op eval for OR - no active cardiac conditions, fu pre op EKG ordered, medically optimized for OR    # Type 2 diabetes mellitus with complication, without long-term current use of insulin.  outpt on metformin  1000mg in AM  a1c 6.7  cont ARNOLD while NPO    # Hypothyroidism  c/w Synthroid 125mcg qd    # Essential hypertension, HLD  losartan 50mg qd  cont atorvastatin 20mg    # Benign prostatic hyperplasia  c/w Flomax proscar    Heparin SC for DVT ppx    Thank you for this consult. Please call [facility] with questions [phone].

## 2023-03-16 NOTE — CONSULT NOTE ADULT - SUBJECTIVE AND OBJECTIVE BOX
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  Patient reports he vomited after dinner 1.5 weeks ago, and since has been experiencing abdominal pain around the area of his hernia.  Patient reports he has a chronic sense of discomfort in the area, however since this episode of vomiting has been having waxing and waning bouts of more severe pain.  Patient vomited last night after dinner, and then vomited again this morning prompting  him to seek further care in the ED.  Patient denies nausea at this time and is passing flatus, last BM was earlier today.  Patient denies fevers, chills, urinary complaints, other abdominal pain, chest pain, shortness of breath.    CT scan abdomen/pelvis showing small bowel obstruction with transition point within patient's known ventral hernia sac. No evidence of closed loop. No overt evidence of ischemia or perforation. Indeterminate hepatic hypodensities within the right hepatic lobe appear grossly unchanged when compared to prior MR from outside institution 12/16/2021    Vital Signs Last 24 Hrs  T(C): 36.4 (15 Mar 2023 13:01), Max: 36.4 (15 Mar 2023 11:57)  T(F): 97.6 (15 Mar 2023 13:01), Max: 97.6 (15 Mar 2023 13:01)  HR: 92 (15 Mar 2023 13:01) (92 - 98)  BP: 114/65 (15 Mar 2023 13:01) (114/65 - 114/71)  BP(mean): --  RR: 16 (15 Mar 2023 13:01) (16 - 16)  SpO2: 97% (15 Mar 2023 13:01) (96% - 97%)    Parameters below as of 15 Mar 2023 13:01  Patient On (Oxygen Delivery Method): room air     (15 Mar 2023 17:23)      PAST MEDICAL & SURGICAL HISTORY:  Diabetes      Thyroid nodule      Hypertension      GERD (gastroesophageal reflux disease)      Overactive bladder      BPH (benign prostatic hypertrophy)      Spinal stenosis      Hypothyroid      Abdominal mass      H/O partial thyroidectomy      H/O Spinal surgery  2016 - with hardware          FAMILY HISTORY:  Family history of hypertension (Father)    Family history of heart disease        SOCIAL HISTORY:    Allergies    No Known Allergies    Intolerances        Review of Systems:    General:	Denies fatigue, fevers, chills, sweats, decreased appetite.    Skin/Breast: denies pruritis, rash  	  Ophthalmologic: Denies change in vision or blurring  	  HEENT	Denies dry mouth, oral sores, dysphagia,  change in hearing.    Respiratory and Thorax:  Denies cough, sob, wheeze, hemoptysis  	  Cardiovascular:	Denies cp , palp, orthopnea    Gastrointestinal:	Denies n/v/d constipation    Genitourinary:	Denies dysuria of frequency, hematuria, flank pain    Musculoskeletal:	Denies bone or joint pain, muscle aches.     Neurological:	 Denies change in sensory or motor function, headache, weakness.     Psychiatric:	Denies depression, anxiety, insomnia.     Hematology/Lymphatics: Denies bleeding or bruising  	    SUBJECTIVE / OVERNIGHT EVENTS:  pt seen and examined. feels well. no nvd. no abd pain. co diarrhea.       Vital Signs Last 24 Hrs  T(C): 37.1 (16 Mar 2023 09:04), Max: 37.1 (16 Mar 2023 09:04)  T(F): 98.7 (16 Mar 2023 09:04), Max: 98.7 (16 Mar 2023 09:04)  HR: 88 (16 Mar 2023 09:04) (80 - 98)  BP: 116/70 (16 Mar 2023 09:04) (112/78 - 122/68)  BP(mean): --  RR: 18 (16 Mar 2023 09:04) (16 - 18)  SpO2: 97% (16 Mar 2023 09:04) (93% - 97%)    Parameters below as of 16 Mar 2023 09:04  Patient On (Oxygen Delivery Method): room air      I&O's Summary    15 Mar 2023 07:01  -  16 Mar 2023 07:00  --------------------------------------------------------  IN: 900 mL / OUT: 640 mL / NET: 260 mL    16 Mar 2023 07:01  -  16 Mar 2023 11:01  --------------------------------------------------------  IN: 120 mL / OUT: 300 mL / NET: -180 mL        PHYSICAL EXAM:  GENERAL: NAD, Comfortable  HEAD:  Atraumatic, Normocephalic  EYES: conjunctiva and sclera clear  NECK: Supple, No JVD  CHEST/LUNG: Clear to auscultation bilaterally; No wheeze  HEART: Regular rate and rhythm; No murmurs, rubs, or gallops  ABDOMEN: Soft, Nontender, Nondistended; slow Bowel sounds  Neuro: AAOx3, no focal deficit, 5/5 b/l extremities  EXTREMITIES:  2+ Peripheral Pulses, No edema  SKIN: No rashes or lesions    LABS:                        12.2   6.20  )-----------( 242      ( 16 Mar 2023 06:59 )             38.5     03-16    130<L>  |  95<L>  |  17  ----------------------------<  92  3.5   |  23  |  0.77    Ca    8.6      16 Mar 2023 06:59    TPro  6.2  /  Alb  3.6  /  TBili  0.7  /  DBili  x   /  AST  22  /  ALT  25  /  AlkPhos  109  03-16    PT/INR - ( 16 Mar 2023 07:01 )   PT: 12.6 sec;   INR: 1.09 ratio         PTT - ( 16 Mar 2023 07:01 )  PTT:28.4 sec  CAPILLARY BLOOD GLUCOSE      POCT Blood Glucose.: 93 mg/dL (16 Mar 2023 06:24)  POCT Blood Glucose.: 84 mg/dL (16 Mar 2023 05:32)  POCT Blood Glucose.: 94 mg/dL (15 Mar 2023 23:34)            RADIOLOGY & ADDITIONAL TESTS:    Imaging Personally Reviewed:  [x] YES  [ ] NO    Consultant(s) Notes Reviewed:  [x] YES  [ ] NO      MEDICATIONS  (STANDING):  dextrose 5%. 1000 milliLiter(s) (50 mL/Hr) IV Continuous <Continuous>  dextrose 5%. 1000 milliLiter(s) (100 mL/Hr) IV Continuous <Continuous>  dextrose 50% Injectable 25 Gram(s) IV Push once  dextrose 50% Injectable 12.5 Gram(s) IV Push once  dextrose 50% Injectable 25 Gram(s) IV Push once  enoxaparin Injectable 40 milliGRAM(s) SubCutaneous every 24 hours  finasteride 5 milliGRAM(s) Oral daily  glucagon  Injectable 1 milliGRAM(s) IntraMuscular once  insulin lispro (ADMELOG) corrective regimen sliding scale   SubCutaneous Before meals and at bedtime  levothyroxine 125 MICROGram(s) Oral daily  losartan 50 milliGRAM(s) Oral daily  pantoprazole    Tablet 40 milliGRAM(s) Oral before breakfast  potassium chloride  10 mEq/100 mL IVPB 10 milliEquivalent(s) IV Intermittent every 1 hour  sodium chloride 0.9%. 1000 milliLiter(s) (50 mL/Hr) IV Continuous <Continuous>  tamsulosin 0.4 milliGRAM(s) Oral at bedtime    MEDICATIONS  (PRN):  dextrose Oral Gel 15 Gram(s) Oral once PRN Blood Glucose LESS THAN 70 milliGRAM(s)/deciliter      Care Discussed with Consultants/Other Providers [x] YES  [ ] NO    HEALTH ISSUES - PROBLEM Dx:

## 2023-03-17 ENCOUNTER — APPOINTMENT (OUTPATIENT)
Dept: SURGICAL ONCOLOGY | Facility: HOSPITAL | Age: 83
End: 2023-03-17

## 2023-03-17 ENCOUNTER — RESULT REVIEW (OUTPATIENT)
Age: 83
End: 2023-03-17

## 2023-03-17 LAB
ANION GAP SERPL CALC-SCNC: 11 MMOL/L — SIGNIFICANT CHANGE UP (ref 5–17)
BUN SERPL-MCNC: 11 MG/DL — SIGNIFICANT CHANGE UP (ref 7–23)
CALCIUM SERPL-MCNC: 8.5 MG/DL — SIGNIFICANT CHANGE UP (ref 8.4–10.5)
CHLORIDE SERPL-SCNC: 99 MMOL/L — SIGNIFICANT CHANGE UP (ref 96–108)
CO2 SERPL-SCNC: 25 MMOL/L — SIGNIFICANT CHANGE UP (ref 22–31)
CREAT SERPL-MCNC: 0.8 MG/DL — SIGNIFICANT CHANGE UP (ref 0.5–1.3)
EGFR: 88 ML/MIN/1.73M2 — SIGNIFICANT CHANGE UP
GLUCOSE BLDC GLUCOMTR-MCNC: 110 MG/DL — HIGH (ref 70–99)
GLUCOSE BLDC GLUCOMTR-MCNC: 119 MG/DL — HIGH (ref 70–99)
GLUCOSE BLDC GLUCOMTR-MCNC: 98 MG/DL — SIGNIFICANT CHANGE UP (ref 70–99)
GLUCOSE SERPL-MCNC: 82 MG/DL — SIGNIFICANT CHANGE UP (ref 70–99)
HCT VFR BLD CALC: 39.2 % — SIGNIFICANT CHANGE UP (ref 39–50)
HGB BLD-MCNC: 12.4 G/DL — LOW (ref 13–17)
MAGNESIUM SERPL-MCNC: 1.6 MG/DL — SIGNIFICANT CHANGE UP (ref 1.6–2.6)
MCHC RBC-ENTMCNC: 26.7 PG — LOW (ref 27–34)
MCHC RBC-ENTMCNC: 31.6 GM/DL — LOW (ref 32–36)
MCV RBC AUTO: 84.3 FL — SIGNIFICANT CHANGE UP (ref 80–100)
NRBC # BLD: 0 /100 WBCS — SIGNIFICANT CHANGE UP (ref 0–0)
PHOSPHATE SERPL-MCNC: 2.2 MG/DL — LOW (ref 2.5–4.5)
PLATELET # BLD AUTO: 247 K/UL — SIGNIFICANT CHANGE UP (ref 150–400)
POTASSIUM SERPL-MCNC: 3.6 MMOL/L — SIGNIFICANT CHANGE UP (ref 3.5–5.3)
POTASSIUM SERPL-SCNC: 3.6 MMOL/L — SIGNIFICANT CHANGE UP (ref 3.5–5.3)
RBC # BLD: 4.65 M/UL — SIGNIFICANT CHANGE UP (ref 4.2–5.8)
RBC # FLD: 14 % — SIGNIFICANT CHANGE UP (ref 10.3–14.5)
SODIUM SERPL-SCNC: 135 MMOL/L — SIGNIFICANT CHANGE UP (ref 135–145)
WBC # BLD: 5.31 K/UL — SIGNIFICANT CHANGE UP (ref 3.8–10.5)
WBC # FLD AUTO: 5.31 K/UL — SIGNIFICANT CHANGE UP (ref 3.8–10.5)

## 2023-03-17 PROCEDURE — 88302 TISSUE EXAM BY PATHOLOGIST: CPT | Mod: 26

## 2023-03-17 PROCEDURE — 49593 RPR AA HRN 1ST 3-10 RDC: CPT

## 2023-03-17 DEVICE — MESH VENTRAL PATCH 8.6 CM: Type: IMPLANTABLE DEVICE | Status: FUNCTIONAL

## 2023-03-17 RX ORDER — DEXTROSE 50 % IN WATER 50 %
25 SYRINGE (ML) INTRAVENOUS ONCE
Refills: 0 | Status: DISCONTINUED | OUTPATIENT
Start: 2023-03-17 | End: 2023-03-19

## 2023-03-17 RX ORDER — ACETAMINOPHEN 500 MG
1000 TABLET ORAL EVERY 6 HOURS
Refills: 0 | Status: COMPLETED | OUTPATIENT
Start: 2023-03-17 | End: 2023-03-18

## 2023-03-17 RX ORDER — GLUCAGON INJECTION, SOLUTION 0.5 MG/.1ML
1 INJECTION, SOLUTION SUBCUTANEOUS ONCE
Refills: 0 | Status: DISCONTINUED | OUTPATIENT
Start: 2023-03-17 | End: 2023-03-19

## 2023-03-17 RX ORDER — FENTANYL CITRATE 50 UG/ML
25 INJECTION INTRAVENOUS
Refills: 0 | Status: DISCONTINUED | OUTPATIENT
Start: 2023-03-17 | End: 2023-03-17

## 2023-03-17 RX ORDER — INSULIN LISPRO 100/ML
VIAL (ML) SUBCUTANEOUS EVERY 6 HOURS
Refills: 0 | Status: DISCONTINUED | OUTPATIENT
Start: 2023-03-17 | End: 2023-03-18

## 2023-03-17 RX ORDER — POTASSIUM PHOSPHATE, MONOBASIC POTASSIUM PHOSPHATE, DIBASIC 236; 224 MG/ML; MG/ML
30 INJECTION, SOLUTION INTRAVENOUS ONCE
Refills: 0 | Status: COMPLETED | OUTPATIENT
Start: 2023-03-17 | End: 2023-03-17

## 2023-03-17 RX ORDER — MAGNESIUM SULFATE 500 MG/ML
2 VIAL (ML) INJECTION ONCE
Refills: 0 | Status: COMPLETED | OUTPATIENT
Start: 2023-03-17 | End: 2023-03-17

## 2023-03-17 RX ORDER — DEXTROSE 50 % IN WATER 50 %
15 SYRINGE (ML) INTRAVENOUS ONCE
Refills: 0 | Status: DISCONTINUED | OUTPATIENT
Start: 2023-03-17 | End: 2023-03-19

## 2023-03-17 RX ORDER — SODIUM CHLORIDE 9 MG/ML
1000 INJECTION, SOLUTION INTRAVENOUS
Refills: 0 | Status: DISCONTINUED | OUTPATIENT
Start: 2023-03-17 | End: 2023-03-19

## 2023-03-17 RX ORDER — OXYCODONE HYDROCHLORIDE 5 MG/1
2.5 TABLET ORAL EVERY 6 HOURS
Refills: 0 | Status: DISCONTINUED | OUTPATIENT
Start: 2023-03-17 | End: 2023-03-19

## 2023-03-17 RX ORDER — DEXTROSE 50 % IN WATER 50 %
12.5 SYRINGE (ML) INTRAVENOUS ONCE
Refills: 0 | Status: DISCONTINUED | OUTPATIENT
Start: 2023-03-17 | End: 2023-03-19

## 2023-03-17 RX ORDER — OXYCODONE HYDROCHLORIDE 5 MG/1
5 TABLET ORAL EVERY 6 HOURS
Refills: 0 | Status: DISCONTINUED | OUTPATIENT
Start: 2023-03-17 | End: 2023-03-19

## 2023-03-17 RX ORDER — ONDANSETRON 8 MG/1
4 TABLET, FILM COATED ORAL ONCE
Refills: 0 | Status: DISCONTINUED | OUTPATIENT
Start: 2023-03-17 | End: 2023-03-17

## 2023-03-17 RX ADMIN — ENOXAPARIN SODIUM 40 MILLIGRAM(S): 100 INJECTION SUBCUTANEOUS at 05:55

## 2023-03-17 RX ADMIN — Medication 400 MILLIGRAM(S): at 20:23

## 2023-03-17 RX ADMIN — FENTANYL CITRATE 25 MICROGRAM(S): 50 INJECTION INTRAVENOUS at 16:45

## 2023-03-17 RX ADMIN — POTASSIUM PHOSPHATE, MONOBASIC POTASSIUM PHOSPHATE, DIBASIC 83.33 MILLIMOLE(S): 236; 224 INJECTION, SOLUTION INTRAVENOUS at 10:08

## 2023-03-17 RX ADMIN — Medication 125 MICROGRAM(S): at 05:56

## 2023-03-17 RX ADMIN — Medication 25 GRAM(S): at 10:03

## 2023-03-17 RX ADMIN — TAMSULOSIN HYDROCHLORIDE 0.4 MILLIGRAM(S): 0.4 CAPSULE ORAL at 21:04

## 2023-03-17 RX ADMIN — LOSARTAN POTASSIUM 50 MILLIGRAM(S): 100 TABLET, FILM COATED ORAL at 05:56

## 2023-03-17 RX ADMIN — SODIUM CHLORIDE 75 MILLILITER(S): 9 INJECTION, SOLUTION INTRAVENOUS at 20:23

## 2023-03-17 RX ADMIN — SODIUM CHLORIDE 75 MILLILITER(S): 9 INJECTION, SOLUTION INTRAVENOUS at 16:15

## 2023-03-17 RX ADMIN — SODIUM CHLORIDE 125 MILLILITER(S): 9 INJECTION, SOLUTION INTRAVENOUS at 05:56

## 2023-03-17 NOTE — PRE-ANESTHESIA EVALUATION ADULT - NSANTHPEFT_GEN_ALL_CORE
LOV 10/12/21. RTC 3-4 months. F/u 02/14/22. Pended 3 month supply.
PE reviewed no new signs/symptoms

## 2023-03-17 NOTE — PROGRESS NOTE ADULT - ATTENDING COMMENTS
D/w pt plan for inciisonal hernia repair with mesh    Discussed r/b/a post op expectations poss complications w pt and family.    Pt understands and agrees to proceed.

## 2023-03-17 NOTE — BRIEF OPERATIVE NOTE - NSICDXBRIEFPROCEDURE_GEN_ALL_CORE_FT
PROCEDURES:  Open repair of incisional hernia of anterior abdominal wall using synthetic mesh and anterior component separation technique 17-Mar-2023 16:03:29  Lena Schultz

## 2023-03-17 NOTE — PRE-OP CHECKLIST - RESPIRATORY RATE (BREATHS/MIN)
What Type Of Note Output Would You Prefer (Optional)?: Standard Output How Severe Is Your Acne?: moderate Is This A New Presentation, Or A Follow-Up?: Acne 18

## 2023-03-17 NOTE — CHART NOTE - NSCHARTNOTEFT_GEN_A_CORE
Surgery Post-Op Note    Pre-Op Dx: Ventral incisional hernia  Procedure: Open repair of recurrent incisional hernia of anterior abdominal wall using biological mesh and anterior component separation technique    Open repair of incisional hernia of anterior abdominal wall using synthetic mesh and anterior component separation technique      Surgeon: Dr. Saldivar    SUBJECTIVE:  Pt seen and examined at the bedside. Pt would like to have something to drink. Denies N/V. Pain controlled with medication. Not yet passing gas or having BM    OBJECTIVE:  Vital Signs Last 24 Hrs  T(C): 36.3 (17 Mar 2023 21:06), Max: 36.7 (16 Mar 2023 21:36)  T(F): 97.3 (17 Mar 2023 21:06), Max: 98.1 (16 Mar 2023 21:36)  HR: 65 (17 Mar 2023 21:06) (60 - 80)  BP: 149/84 (17 Mar 2023 21:06) (127/70 - 160/90)  BP(mean): 103 (17 Mar 2023 17:15) (92 - 103)  RR: 18 (17 Mar 2023 21:06) (17 - 18)  SpO2: 96% (17 Mar 2023 21:06) (94% - 99%)    Parameters below as of 17 Mar 2023 21:06  Patient On (Oxygen Delivery Method): room air        Physical Exam:  General: NAD, resting comfortably in bed  Neuro: A/O x 3, no focal deficits  Pulmonary: Nonlabored breathing, no respiratory distress  Abdominal: soft, ATTP. ND, midline incision: C/D/I   Extremities: WWP    LABS:                        12.4   5.31  )-----------( 247      ( 17 Mar 2023 06:49 )             39.2     03-17    135  |  99  |  11  ----------------------------<  82  3.6   |  25  |  0.80    Ca    8.5      17 Mar 2023 06:45  Phos  2.2     03-17  Mg     1.6     03-17    TPro  6.2  /  Alb  3.6  /  TBili  0.7  /  DBili  x   /  AST  22  /  ALT  25  /  AlkPhos  109  03-16    PT/INR - ( 16 Mar 2023 07:01 )   PT: 12.6 sec;   INR: 1.09 ratio         PTT - ( 16 Mar 2023 07:01 )  PTT:28.4 sec  CAPILLARY BLOOD GLUCOSE      POCT Blood Glucose.: 98 mg/dL (17 Mar 2023 15:50)  POCT Blood Glucose.: 110 mg/dL (17 Mar 2023 11:28)  POCT Blood Glucose.: 83 mg/dL (16 Mar 2023 21:58)      LIVER FUNCTIONS - ( 16 Mar 2023 06:59 )  Alb: 3.6 g/dL / Pro: 6.2 g/dL / ALK PHOS: 109 U/L / ALT: 25 U/L / AST: 22 U/L / GGT: x               IMAGING:    ASSESSMENT:83y Male now 4hours s/p umbilical hernia repair    PLAN:  - Pain control  - Encourage IS  - Diet: sips & chips, IVF  - Monitor I+Os  - OOB/ Ambulate  - DVT ppx: Lovenox    Red Team Surgery  [id number]

## 2023-03-17 NOTE — BRIEF OPERATIVE NOTE - OPERATION/FINDINGS
Abdomen entered though the previous umbilical incision. Bowel was ran from LoT to SB anastomosis; a small dilation of the bowel was noted most likley from brian SBO, on inspection bowel seem to be patent and no other causes for SBO were identified.  Incisional hernia repair with intraabdominal mesh placement. Abdomen closed with 1 Maxon; deep dermal and 4.0 Monocryl.

## 2023-03-18 LAB
ANION GAP SERPL CALC-SCNC: 14 MMOL/L — SIGNIFICANT CHANGE UP (ref 5–17)
BUN SERPL-MCNC: 9 MG/DL — SIGNIFICANT CHANGE UP (ref 7–23)
CALCIUM SERPL-MCNC: 8.2 MG/DL — LOW (ref 8.4–10.5)
CHLORIDE SERPL-SCNC: 98 MMOL/L — SIGNIFICANT CHANGE UP (ref 96–108)
CO2 SERPL-SCNC: 22 MMOL/L — SIGNIFICANT CHANGE UP (ref 22–31)
CREAT SERPL-MCNC: 0.73 MG/DL — SIGNIFICANT CHANGE UP (ref 0.5–1.3)
EGFR: 90 ML/MIN/1.73M2 — SIGNIFICANT CHANGE UP
GLUCOSE BLDC GLUCOMTR-MCNC: 112 MG/DL — HIGH (ref 70–99)
GLUCOSE BLDC GLUCOMTR-MCNC: 116 MG/DL — HIGH (ref 70–99)
GLUCOSE BLDC GLUCOMTR-MCNC: 152 MG/DL — HIGH (ref 70–99)
GLUCOSE BLDC GLUCOMTR-MCNC: 183 MG/DL — HIGH (ref 70–99)
GLUCOSE SERPL-MCNC: 111 MG/DL — HIGH (ref 70–99)
HCT VFR BLD CALC: 38.3 % — LOW (ref 39–50)
HGB BLD-MCNC: 12.2 G/DL — LOW (ref 13–17)
MAGNESIUM SERPL-MCNC: 1.8 MG/DL — SIGNIFICANT CHANGE UP (ref 1.6–2.6)
MCHC RBC-ENTMCNC: 27.1 PG — SIGNIFICANT CHANGE UP (ref 27–34)
MCHC RBC-ENTMCNC: 31.9 GM/DL — LOW (ref 32–36)
MCV RBC AUTO: 85.1 FL — SIGNIFICANT CHANGE UP (ref 80–100)
NRBC # BLD: 0 /100 WBCS — SIGNIFICANT CHANGE UP (ref 0–0)
PHOSPHATE SERPL-MCNC: 3 MG/DL — SIGNIFICANT CHANGE UP (ref 2.5–4.5)
PLATELET # BLD AUTO: 254 K/UL — SIGNIFICANT CHANGE UP (ref 150–400)
POTASSIUM SERPL-MCNC: 3.5 MMOL/L — SIGNIFICANT CHANGE UP (ref 3.5–5.3)
POTASSIUM SERPL-SCNC: 3.5 MMOL/L — SIGNIFICANT CHANGE UP (ref 3.5–5.3)
RBC # BLD: 4.5 M/UL — SIGNIFICANT CHANGE UP (ref 4.2–5.8)
RBC # FLD: 14 % — SIGNIFICANT CHANGE UP (ref 10.3–14.5)
SODIUM SERPL-SCNC: 134 MMOL/L — LOW (ref 135–145)
WBC # BLD: 6.53 K/UL — SIGNIFICANT CHANGE UP (ref 3.8–10.5)
WBC # FLD AUTO: 6.53 K/UL — SIGNIFICANT CHANGE UP (ref 3.8–10.5)

## 2023-03-18 RX ORDER — ACETAMINOPHEN 500 MG
975 TABLET ORAL EVERY 6 HOURS
Refills: 0 | Status: DISCONTINUED | OUTPATIENT
Start: 2023-03-18 | End: 2023-03-19

## 2023-03-18 RX ORDER — INSULIN LISPRO 100/ML
VIAL (ML) SUBCUTANEOUS
Refills: 0 | Status: DISCONTINUED | OUTPATIENT
Start: 2023-03-18 | End: 2023-03-19

## 2023-03-18 RX ADMIN — Medication 125 MICROGRAM(S): at 05:16

## 2023-03-18 RX ADMIN — TAMSULOSIN HYDROCHLORIDE 0.4 MILLIGRAM(S): 0.4 CAPSULE ORAL at 22:39

## 2023-03-18 RX ADMIN — OXYCODONE HYDROCHLORIDE 5 MILLIGRAM(S): 5 TABLET ORAL at 13:28

## 2023-03-18 RX ADMIN — ENOXAPARIN SODIUM 40 MILLIGRAM(S): 100 INJECTION SUBCUTANEOUS at 05:16

## 2023-03-18 RX ADMIN — Medication 400 MILLIGRAM(S): at 02:14

## 2023-03-18 RX ADMIN — FINASTERIDE 5 MILLIGRAM(S): 5 TABLET, FILM COATED ORAL at 13:24

## 2023-03-18 RX ADMIN — Medication 975 MILLIGRAM(S): at 23:35

## 2023-03-18 RX ADMIN — LOSARTAN POTASSIUM 50 MILLIGRAM(S): 100 TABLET, FILM COATED ORAL at 05:16

## 2023-03-18 RX ADMIN — Medication 1000 MILLIGRAM(S): at 14:00

## 2023-03-18 RX ADMIN — PANTOPRAZOLE SODIUM 40 MILLIGRAM(S): 20 TABLET, DELAYED RELEASE ORAL at 05:16

## 2023-03-18 RX ADMIN — Medication 400 MILLIGRAM(S): at 13:22

## 2023-03-18 RX ADMIN — OXYCODONE HYDROCHLORIDE 5 MILLIGRAM(S): 5 TABLET ORAL at 14:00

## 2023-03-19 ENCOUNTER — TRANSCRIPTION ENCOUNTER (OUTPATIENT)
Age: 83
End: 2023-03-19

## 2023-03-19 VITALS
HEART RATE: 80 BPM | DIASTOLIC BLOOD PRESSURE: 90 MMHG | SYSTOLIC BLOOD PRESSURE: 163 MMHG | RESPIRATION RATE: 18 BRPM | OXYGEN SATURATION: 99 % | TEMPERATURE: 98 F

## 2023-03-19 LAB
ANION GAP SERPL CALC-SCNC: 8 MMOL/L — SIGNIFICANT CHANGE UP (ref 5–17)
BUN SERPL-MCNC: 8 MG/DL — SIGNIFICANT CHANGE UP (ref 7–23)
CALCIUM SERPL-MCNC: 8.4 MG/DL — SIGNIFICANT CHANGE UP (ref 8.4–10.5)
CHLORIDE SERPL-SCNC: 100 MMOL/L — SIGNIFICANT CHANGE UP (ref 96–108)
CO2 SERPL-SCNC: 27 MMOL/L — SIGNIFICANT CHANGE UP (ref 22–31)
CREAT SERPL-MCNC: 0.79 MG/DL — SIGNIFICANT CHANGE UP (ref 0.5–1.3)
EGFR: 88 ML/MIN/1.73M2 — SIGNIFICANT CHANGE UP
GLUCOSE BLDC GLUCOMTR-MCNC: 119 MG/DL — HIGH (ref 70–99)
GLUCOSE BLDC GLUCOMTR-MCNC: 158 MG/DL — HIGH (ref 70–99)
GLUCOSE SERPL-MCNC: 118 MG/DL — HIGH (ref 70–99)
HCT VFR BLD CALC: 37.9 % — LOW (ref 39–50)
HGB BLD-MCNC: 12.2 G/DL — LOW (ref 13–17)
MAGNESIUM SERPL-MCNC: 1.8 MG/DL — SIGNIFICANT CHANGE UP (ref 1.6–2.6)
MCHC RBC-ENTMCNC: 27.3 PG — SIGNIFICANT CHANGE UP (ref 27–34)
MCHC RBC-ENTMCNC: 32.2 GM/DL — SIGNIFICANT CHANGE UP (ref 32–36)
MCV RBC AUTO: 84.8 FL — SIGNIFICANT CHANGE UP (ref 80–100)
NRBC # BLD: 0 /100 WBCS — SIGNIFICANT CHANGE UP (ref 0–0)
PHOSPHATE SERPL-MCNC: 1.8 MG/DL — LOW (ref 2.5–4.5)
PLATELET # BLD AUTO: 246 K/UL — SIGNIFICANT CHANGE UP (ref 150–400)
POTASSIUM SERPL-MCNC: 4.2 MMOL/L — SIGNIFICANT CHANGE UP (ref 3.5–5.3)
POTASSIUM SERPL-SCNC: 4.2 MMOL/L — SIGNIFICANT CHANGE UP (ref 3.5–5.3)
RBC # BLD: 4.47 M/UL — SIGNIFICANT CHANGE UP (ref 4.2–5.8)
RBC # FLD: 14.2 % — SIGNIFICANT CHANGE UP (ref 10.3–14.5)
SODIUM SERPL-SCNC: 135 MMOL/L — SIGNIFICANT CHANGE UP (ref 135–145)
WBC # BLD: 5.48 K/UL — SIGNIFICANT CHANGE UP (ref 3.8–10.5)
WBC # FLD AUTO: 5.48 K/UL — SIGNIFICANT CHANGE UP (ref 3.8–10.5)

## 2023-03-19 PROCEDURE — 82803 BLOOD GASES ANY COMBINATION: CPT

## 2023-03-19 PROCEDURE — 80048 BASIC METABOLIC PNL TOTAL CA: CPT

## 2023-03-19 PROCEDURE — 97161 PT EVAL LOW COMPLEX 20 MIN: CPT

## 2023-03-19 PROCEDURE — 88302 TISSUE EXAM BY PATHOLOGIST: CPT

## 2023-03-19 PROCEDURE — 99285 EMERGENCY DEPT VISIT HI MDM: CPT

## 2023-03-19 PROCEDURE — 82962 GLUCOSE BLOOD TEST: CPT

## 2023-03-19 PROCEDURE — 83605 ASSAY OF LACTIC ACID: CPT

## 2023-03-19 PROCEDURE — 84100 ASSAY OF PHOSPHORUS: CPT

## 2023-03-19 PROCEDURE — 36415 COLL VENOUS BLD VENIPUNCTURE: CPT

## 2023-03-19 PROCEDURE — 85025 COMPLETE CBC W/AUTO DIFF WBC: CPT

## 2023-03-19 PROCEDURE — 85730 THROMBOPLASTIN TIME PARTIAL: CPT

## 2023-03-19 PROCEDURE — 85018 HEMOGLOBIN: CPT

## 2023-03-19 PROCEDURE — 84484 ASSAY OF TROPONIN QUANT: CPT

## 2023-03-19 PROCEDURE — 84132 ASSAY OF SERUM POTASSIUM: CPT

## 2023-03-19 PROCEDURE — C1781: CPT

## 2023-03-19 PROCEDURE — 83036 HEMOGLOBIN GLYCOSYLATED A1C: CPT

## 2023-03-19 PROCEDURE — 85014 HEMATOCRIT: CPT

## 2023-03-19 PROCEDURE — 82947 ASSAY GLUCOSE BLOOD QUANT: CPT

## 2023-03-19 PROCEDURE — C9399: CPT

## 2023-03-19 PROCEDURE — 80053 COMPREHEN METABOLIC PANEL: CPT

## 2023-03-19 PROCEDURE — 82565 ASSAY OF CREATININE: CPT

## 2023-03-19 PROCEDURE — 82435 ASSAY OF BLOOD CHLORIDE: CPT

## 2023-03-19 PROCEDURE — 86901 BLOOD TYPING SEROLOGIC RH(D): CPT

## 2023-03-19 PROCEDURE — 85610 PROTHROMBIN TIME: CPT

## 2023-03-19 PROCEDURE — 83690 ASSAY OF LIPASE: CPT

## 2023-03-19 PROCEDURE — 84295 ASSAY OF SERUM SODIUM: CPT

## 2023-03-19 PROCEDURE — 82330 ASSAY OF CALCIUM: CPT

## 2023-03-19 PROCEDURE — 83735 ASSAY OF MAGNESIUM: CPT

## 2023-03-19 PROCEDURE — 86850 RBC ANTIBODY SCREEN: CPT

## 2023-03-19 PROCEDURE — 87637 SARSCOV2&INF A&B&RSV AMP PRB: CPT

## 2023-03-19 PROCEDURE — 93005 ELECTROCARDIOGRAM TRACING: CPT

## 2023-03-19 PROCEDURE — 85027 COMPLETE CBC AUTOMATED: CPT

## 2023-03-19 PROCEDURE — 86900 BLOOD TYPING SEROLOGIC ABO: CPT

## 2023-03-19 PROCEDURE — 74177 CT ABD & PELVIS W/CONTRAST: CPT | Mod: MA

## 2023-03-19 RX ORDER — ACETAMINOPHEN 500 MG
1 TABLET ORAL
Qty: 28 | Refills: 0
Start: 2023-03-19 | End: 2023-03-25

## 2023-03-19 RX ORDER — MAGNESIUM SULFATE 500 MG/ML
2 VIAL (ML) INJECTION ONCE
Refills: 0 | Status: COMPLETED | OUTPATIENT
Start: 2023-03-19 | End: 2023-03-19

## 2023-03-19 RX ADMIN — Medication 975 MILLIGRAM(S): at 05:39

## 2023-03-19 RX ADMIN — Medication 975 MILLIGRAM(S): at 13:15

## 2023-03-19 RX ADMIN — PANTOPRAZOLE SODIUM 40 MILLIGRAM(S): 20 TABLET, DELAYED RELEASE ORAL at 05:38

## 2023-03-19 RX ADMIN — ENOXAPARIN SODIUM 40 MILLIGRAM(S): 100 INJECTION SUBCUTANEOUS at 05:38

## 2023-03-19 RX ADMIN — Medication 25 GRAM(S): at 12:57

## 2023-03-19 RX ADMIN — Medication 85 MILLIMOLE(S): at 09:51

## 2023-03-19 RX ADMIN — Medication 125 MICROGRAM(S): at 05:38

## 2023-03-19 RX ADMIN — LOSARTAN POTASSIUM 50 MILLIGRAM(S): 100 TABLET, FILM COATED ORAL at 05:38

## 2023-03-19 RX ADMIN — FINASTERIDE 5 MILLIGRAM(S): 5 TABLET, FILM COATED ORAL at 12:49

## 2023-03-19 RX ADMIN — Medication 975 MILLIGRAM(S): at 12:49

## 2023-03-19 NOTE — DISCHARGE NOTE PROVIDER - HOSPITAL COURSE
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  CT scan abdomen/pelvis showing small bowel obstruction with transition point within patient's known ventral hernia sac.     OR (3/17): ventral hernia repair with mesh.    3/19: advance to [facility], discharge if tolerates 2 meal.      Upon discharge the patient had stable vitals, tolerating diet, and no in any distress. All his questions were answered and follow up instructions were procided.

## 2023-03-19 NOTE — PROGRESS NOTE ADULT - SUBJECTIVE AND OBJECTIVE BOX
Patient is a 83y old  Male who presents with a chief complaint of abd pain    SUBJECTIVE / OVERNIGHT EVENTS:    Patient seen and examined. no complaints. npo for OR. no cp sob abd pain.      Vital Signs Last 24 Hrs  T(C): 36.6 (17 Mar 2023 12:20), Max: 36.7 (16 Mar 2023 21:36)  T(F): 97.9 (17 Mar 2023 12:20), Max: 98.1 (16 Mar 2023 21:36)  HR: 79 (17 Mar 2023 12:20) (60 - 80)  BP: 159/79 (17 Mar 2023 12:20) (126/80 - 159/79)  BP(mean): --  RR: 18 (17 Mar 2023 12:20) (18 - 18)  SpO2: 98% (17 Mar 2023 12:20) (95% - 98%)    Parameters below as of 17 Mar 2023 12:20  Patient On (Oxygen Delivery Method): room air      I&O's Summary    16 Mar 2023 07:01  -  17 Mar 2023 07:00  --------------------------------------------------------  IN: 1200 mL / OUT: 1150 mL / NET: 50 mL    17 Mar 2023 07:01  -  17 Mar 2023 12:51  --------------------------------------------------------  IN: 0 mL / OUT: 300 mL / NET: -300 mL        PE:  GENERAL: NAD, AAOx3  CHEST/LUNG: CTABL, No wheeze  HEART: Regular rate and rhythm; no murmur  ABDOMEN: Soft, Nontender, Nondistended; Bowel sounds present  EXTREMITIES:  2+ Peripheral Pulses, No  edema  NEURO: No focal deficits    LABS:                        12.4   5.31  )-----------( 247      ( 17 Mar 2023 06:49 )             39.2     03-17    135  |  99  |  11  ----------------------------<  82  3.6   |  25  |  0.80    Ca    8.5      17 Mar 2023 06:45  Phos  2.2     03-17  Mg     1.6     03-17    TPro  6.2  /  Alb  3.6  /  TBili  0.7  /  DBili  x   /  AST  22  /  ALT  25  /  AlkPhos  109  03-16    PT/INR - ( 16 Mar 2023 07:01 )   PT: 12.6 sec;   INR: 1.09 ratio         PTT - ( 16 Mar 2023 07:01 )  PTT:28.4 sec  CAPILLARY BLOOD GLUCOSE      POCT Blood Glucose.: 110 mg/dL (17 Mar 2023 11:28)  POCT Blood Glucose.: 83 mg/dL (16 Mar 2023 21:58)  POCT Blood Glucose.: 102 mg/dL (16 Mar 2023 17:35)            RADIOLOGY & ADDITIONAL TESTS:    Imaging Personally Reviewed:  [x] YES  [ ] NO    Consultant(s) Notes Reviewed:  [x] YES  [ ] NO    MEDICATIONS  (STANDING):  dextrose 5% + sodium chloride 0.9%. 1000 milliLiter(s) (75 mL/Hr) IV Continuous <Continuous>  dextrose 5%. 1000 milliLiter(s) (50 mL/Hr) IV Continuous <Continuous>  dextrose 5%. 1000 milliLiter(s) (100 mL/Hr) IV Continuous <Continuous>  dextrose 50% Injectable 25 Gram(s) IV Push once  dextrose 50% Injectable 12.5 Gram(s) IV Push once  dextrose 50% Injectable 25 Gram(s) IV Push once  enoxaparin Injectable 40 milliGRAM(s) SubCutaneous every 24 hours  finasteride 5 milliGRAM(s) Oral daily  glucagon  Injectable 1 milliGRAM(s) IntraMuscular once  insulin lispro (ADMELOG) corrective regimen sliding scale   SubCutaneous Before meals and at bedtime  levothyroxine 125 MICROGram(s) Oral daily  losartan 50 milliGRAM(s) Oral daily  pantoprazole    Tablet 40 milliGRAM(s) Oral before breakfast  tamsulosin 0.4 milliGRAM(s) Oral at bedtime    MEDICATIONS  (PRN):  dextrose Oral Gel 15 Gram(s) Oral once PRN Blood Glucose LESS THAN 70 milliGRAM(s)/deciliter      Care Discussed with Consultants/Other Providers [x] YES  [ ] NO    HEALTH ISSUES - PROBLEM Dx:      
SURGERY DAILY PROGRESS NOTE:       Subjective / Overnight events:  No acute overnight events.  +flatus, +BM.  Feels much better, denies pain.      Objective:      MEDICATIONS  (STANDING):  dextrose 5%. 1000 milliLiter(s) (50 mL/Hr) IV Continuous <Continuous>  dextrose 5%. 1000 milliLiter(s) (100 mL/Hr) IV Continuous <Continuous>  dextrose 50% Injectable 25 Gram(s) IV Push once  dextrose 50% Injectable 12.5 Gram(s) IV Push once  dextrose 50% Injectable 25 Gram(s) IV Push once  enoxaparin Injectable 40 milliGRAM(s) SubCutaneous every 24 hours  finasteride 5 milliGRAM(s) Oral daily  glucagon  Injectable 1 milliGRAM(s) IntraMuscular once  insulin lispro (ADMELOG) corrective regimen sliding scale   SubCutaneous Before meals and at bedtime  levothyroxine 125 MICROGram(s) Oral daily  losartan 50 milliGRAM(s) Oral daily  pantoprazole    Tablet 40 milliGRAM(s) Oral before breakfast  potassium chloride  10 mEq/100 mL IVPB 10 milliEquivalent(s) IV Intermittent every 1 hour  sodium chloride 0.9%. 1000 milliLiter(s) (75 mL/Hr) IV Continuous <Continuous>  tamsulosin 0.4 milliGRAM(s) Oral at bedtime    MEDICATIONS  (PRN):  dextrose Oral Gel 15 Gram(s) Oral once PRN Blood Glucose LESS THAN 70 milliGRAM(s)/deciliter      Vital Signs Last 24 Hrs  T(C): 37 (16 Mar 2023 05:37), Max: 37 (16 Mar 2023 05:37)  T(F): 98.6 (16 Mar 2023 05:37), Max: 98.6 (16 Mar 2023 05:37)  HR: 81 (16 Mar 2023 05:37) (80 - 98)  BP: 122/68 (16 Mar 2023 05:37) (112/78 - 122/68)  BP(mean): --  RR: 18 (16 Mar 2023 05:37) (16 - 18)  SpO2: 96% (16 Mar 2023 05:37) (93% - 97%)    Parameters below as of 16 Mar 2023 05:37  Patient On (Oxygen Delivery Method): room air        I&O's Detail    15 Mar 2023 07:01  -  16 Mar 2023 07:00  --------------------------------------------------------  IN:    sodium chloride 0.9%: 900 mL  Total IN: 900 mL    OUT:    Voided (mL): 640 mL  Total OUT: 640 mL    Total NET: 260 mL          Daily Height in cm: 193.04 (15 Mar 2023 11:57)    Daily     LABS:                        12.2   6.20  )-----------( 242      ( 16 Mar 2023 06:59 )             38.5     03-16    130<L>  |  95<L>  |  17  ----------------------------<  92  3.5   |  23  |  0.77    Ca    8.6      16 Mar 2023 06:59    TPro  6.2  /  Alb  3.6  /  TBili  0.7  /  DBili  x   /  AST  22  /  ALT  25  /  AlkPhos  109  03-16    PT/INR - ( 16 Mar 2023 07:01 )   PT: 12.6 sec;   INR: 1.09 ratio         PTT - ( 16 Mar 2023 07:01 )  PTT:28.4 sec        General: NAD  Cardiac: Regular rate  Respiratory: Nonlabored breathing  Abdominal Exam: soft, nondistended, tender over umbilical hernia, reducible, no rebound, no guarding  Muskuloskeletal: Moves all 4 extremities spontaneously  Neurological: Alert and oriented, no gross focal deficits, no motor or sensory deficits.  Psych: AOX3	  
Patient is a 83y old  Male who presents with a chief complaint of abd pain    SUBJECTIVE / OVERNIGHT EVENTS:    Patient seen and examined. abd pain stable. no flatus yet.      Vital Signs Last 24 Hrs  T(C): 36.8 (18 Mar 2023 08:50), Max: 36.9 (18 Mar 2023 01:08)  T(F): 98.2 (18 Mar 2023 08:50), Max: 98.5 (18 Mar 2023 01:08)  HR: 69 (18 Mar 2023 08:50) (60 - 80)  BP: 152/83 (18 Mar 2023 08:50) (127/70 - 160/90)  BP(mean): 103 (17 Mar 2023 17:15) (92 - 103)  RR: 18 (18 Mar 2023 08:50) (17 - 18)  SpO2: 97% (18 Mar 2023 08:50) (94% - 99%)    Parameters below as of 18 Mar 2023 08:50  Patient On (Oxygen Delivery Method): room air      I&O's Summary    17 Mar 2023 07:01  -  18 Mar 2023 07:00  --------------------------------------------------------  IN: 1050 mL / OUT: 1200 mL / NET: -150 mL        PE:  GENERAL: NAD, AAOx3  CHEST/LUNG: CTABL, No wheeze  HEART: Regular rate and rhythm; no murmur  ABDOMEN: Soft, mild distended, umbilical CDI, Bowel sounds none  EXTREMITIES:  2+ Peripheral Pulses, No clubbing, cyanosis, or edema  NEURO: No focal deficits    LABS:                        12.2   6.53  )-----------( 254      ( 18 Mar 2023 06:29 )             38.3     03-18    134<L>  |  98  |  9   ----------------------------<  111<H>  3.5   |  22  |  0.73    Ca    8.2<L>      18 Mar 2023 06:30  Phos  3.0     03-18  Mg     1.8     03-18        CAPILLARY BLOOD GLUCOSE      POCT Blood Glucose.: 112 mg/dL (18 Mar 2023 05:38)  POCT Blood Glucose.: 119 mg/dL (17 Mar 2023 23:47)  POCT Blood Glucose.: 98 mg/dL (17 Mar 2023 15:50)  POCT Blood Glucose.: 110 mg/dL (17 Mar 2023 11:28)            RADIOLOGY & ADDITIONAL TESTS:    Imaging Personally Reviewed:  [x] YES  [ ] NO    Consultant(s) Notes Reviewed:  [x] YES  [ ] NO    MEDICATIONS  (STANDING):  acetaminophen   IVPB .. 1000 milliGRAM(s) IV Intermittent every 6 hours  dextrose 5% + sodium chloride 0.9%. 1000 milliLiter(s) (75 mL/Hr) IV Continuous <Continuous>  dextrose 5%. 1000 milliLiter(s) (50 mL/Hr) IV Continuous <Continuous>  dextrose 5%. 1000 milliLiter(s) (100 mL/Hr) IV Continuous <Continuous>  dextrose 50% Injectable 25 Gram(s) IV Push once  dextrose 50% Injectable 12.5 Gram(s) IV Push once  dextrose 50% Injectable 25 Gram(s) IV Push once  enoxaparin Injectable 40 milliGRAM(s) SubCutaneous every 24 hours  finasteride 5 milliGRAM(s) Oral daily  glucagon  Injectable 1 milliGRAM(s) IntraMuscular once  insulin lispro (ADMELOG) corrective regimen sliding scale   SubCutaneous every 6 hours  levothyroxine 125 MICROGram(s) Oral daily  losartan 50 milliGRAM(s) Oral daily  pantoprazole    Tablet 40 milliGRAM(s) Oral before breakfast  tamsulosin 0.4 milliGRAM(s) Oral at bedtime    MEDICATIONS  (PRN):  dextrose Oral Gel 15 Gram(s) Oral once PRN Blood Glucose LESS THAN 70 milliGRAM(s)/deciliter  oxyCODONE    IR 2.5 milliGRAM(s) Oral every 6 hours PRN Moderate Pain (4 - 6)  oxyCODONE    IR 5 milliGRAM(s) Oral every 6 hours PRN Severe Pain (7 - 10)      Care Discussed with Consultants/Other Providers [x] YES  [ ] NO    HEALTH ISSUES - PROBLEM Dx:      
Patient is a 83y old  Male who presents with a chief complaint of abd pain    SUBJECTIVE / OVERNIGHT EVENTS:    Patient seen and examined. feels well. no cp sob abd pain.      Vital Signs Last 24 Hrs  T(C): 36.8 (19 Mar 2023 10:14), Max: 36.9 (18 Mar 2023 22:18)  T(F): 98.2 (19 Mar 2023 10:14), Max: 98.5 (18 Mar 2023 22:18)  HR: 76 (19 Mar 2023 10:14) (69 - 76)  BP: 131/76 (19 Mar 2023 10:14) (131/76 - 176/99)  BP(mean): --  RR: 18 (19 Mar 2023 10:14) (18 - 18)  SpO2: 95% (19 Mar 2023 10:14) (95% - 98%)    Parameters below as of 19 Mar 2023 10:14  Patient On (Oxygen Delivery Method): room air      I&O's Summary    18 Mar 2023 07:01  -  19 Mar 2023 07:00  --------------------------------------------------------  IN: 1730 mL / OUT: 2150 mL / NET: -420 mL        PE:  GENERAL: NAD, AAOx3  CHEST/LUNG: CTABL, No wheeze  HEART: Regular rate and rhythm; no murmur  ABDOMEN: Soft, umbilical CDI, NTTP  EXTREMITIES:  2+ Peripheral Pulses, No clubbing, cyanosis, or edema  NEURO: No focal deficits    LABS:                        12.2   5.48  )-----------( 246      ( 19 Mar 2023 06:56 )             37.9     03-19    135  |  100  |  8   ----------------------------<  118<H>  4.2   |  27  |  0.79    Ca    8.4      19 Mar 2023 06:52  Phos  1.8     03-19  Mg     1.8     03-19        CAPILLARY BLOOD GLUCOSE      POCT Blood Glucose.: 116 mg/dL (18 Mar 2023 21:07)  POCT Blood Glucose.: 152 mg/dL (18 Mar 2023 18:32)  POCT Blood Glucose.: 183 mg/dL (18 Mar 2023 12:12)            RADIOLOGY & ADDITIONAL TESTS:    Imaging Personally Reviewed:  [x] YES  [ ] NO    Consultant(s) Notes Reviewed:  [x] YES  [ ] NO    MEDICATIONS  (STANDING):  acetaminophen     Tablet .. 975 milliGRAM(s) Oral every 6 hours  dextrose 5%. 1000 milliLiter(s) (50 mL/Hr) IV Continuous <Continuous>  dextrose 5%. 1000 milliLiter(s) (100 mL/Hr) IV Continuous <Continuous>  dextrose 50% Injectable 25 Gram(s) IV Push once  dextrose 50% Injectable 12.5 Gram(s) IV Push once  dextrose 50% Injectable 25 Gram(s) IV Push once  enoxaparin Injectable 40 milliGRAM(s) SubCutaneous every 24 hours  finasteride 5 milliGRAM(s) Oral daily  glucagon  Injectable 1 milliGRAM(s) IntraMuscular once  insulin lispro (ADMELOG) corrective regimen sliding scale   SubCutaneous Before meals and at bedtime  levothyroxine 125 MICROGram(s) Oral daily  losartan 50 milliGRAM(s) Oral daily  magnesium sulfate  IVPB 2 Gram(s) IV Intermittent once  pantoprazole    Tablet 40 milliGRAM(s) Oral before breakfast  sodium phosphate 30 milliMole(s)/500 mL IVPB 30 milliMole(s) IV Intermittent once  tamsulosin 0.4 milliGRAM(s) Oral at bedtime    MEDICATIONS  (PRN):  dextrose Oral Gel 15 Gram(s) Oral once PRN Blood Glucose LESS THAN 70 milliGRAM(s)/deciliter  oxyCODONE    IR 2.5 milliGRAM(s) Oral every 6 hours PRN Moderate Pain (4 - 6)  oxyCODONE    IR 5 milliGRAM(s) Oral every 6 hours PRN Severe Pain (7 - 10)      Care Discussed with Consultants/Other Providers [x] YES  [ ] NO    HEALTH ISSUES - PROBLEM Dx:      
SURGERY DAILY PROGRESS NOTE:     SUBJECTIVE/ROS: Patient seen at bedside this AM. Doing well, denies n/v -Flatus -Bm     24h Events:   - Overnight, no acute events    OBJECTIVE:  Vital Signs Last 24 Hrs  T(C): 36.8 (18 Mar 2023 08:50), Max: 36.9 (18 Mar 2023 01:08)  T(F): 98.2 (18 Mar 2023 08:50), Max: 98.5 (18 Mar 2023 01:08)  HR: 69 (18 Mar 2023 08:50) (60 - 80)  BP: 152/83 (18 Mar 2023 08:50) (127/70 - 160/90)  BP(mean): 103 (17 Mar 2023 17:15) (92 - 103)  RR: 18 (18 Mar 2023 08:50) (17 - 18)  SpO2: 97% (18 Mar 2023 08:50) (94% - 99%)    Parameters below as of 18 Mar 2023 08:50  Patient On (Oxygen Delivery Method): room air      I&O's Detail    17 Mar 2023 07:01  -  18 Mar 2023 07:00  --------------------------------------------------------  IN:    dextrose 5% + sodium chloride 0.9%: 1050 mL  Total IN: 1050 mL    OUT:    Oral Fluid: 0 mL    Voided (mL): 1200 mL  Total OUT: 1200 mL    Total NET: -150 mL        Daily Height in cm: 193 (17 Mar 2023 12:20)    Daily   MEDICATIONS  (STANDING):  acetaminophen   IVPB .. 1000 milliGRAM(s) IV Intermittent every 6 hours  dextrose 5% + sodium chloride 0.9%. 1000 milliLiter(s) (75 mL/Hr) IV Continuous <Continuous>  dextrose 5%. 1000 milliLiter(s) (50 mL/Hr) IV Continuous <Continuous>  dextrose 5%. 1000 milliLiter(s) (100 mL/Hr) IV Continuous <Continuous>  dextrose 50% Injectable 25 Gram(s) IV Push once  dextrose 50% Injectable 12.5 Gram(s) IV Push once  dextrose 50% Injectable 25 Gram(s) IV Push once  enoxaparin Injectable 40 milliGRAM(s) SubCutaneous every 24 hours  finasteride 5 milliGRAM(s) Oral daily  glucagon  Injectable 1 milliGRAM(s) IntraMuscular once  insulin lispro (ADMELOG) corrective regimen sliding scale   SubCutaneous every 6 hours  levothyroxine 125 MICROGram(s) Oral daily  losartan 50 milliGRAM(s) Oral daily  pantoprazole    Tablet 40 milliGRAM(s) Oral before breakfast  tamsulosin 0.4 milliGRAM(s) Oral at bedtime    MEDICATIONS  (PRN):  dextrose Oral Gel 15 Gram(s) Oral once PRN Blood Glucose LESS THAN 70 milliGRAM(s)/deciliter  oxyCODONE    IR 2.5 milliGRAM(s) Oral every 6 hours PRN Moderate Pain (4 - 6)  oxyCODONE    IR 5 milliGRAM(s) Oral every 6 hours PRN Severe Pain (7 - 10)      LABS:                        12.2   6.53  )-----------( 254      ( 18 Mar 2023 06:29 )             38.3     03-18    134<L>  |  98  |  9   ----------------------------<  111<H>  3.5   |  22  |  0.73    Ca    8.2<L>      18 Mar 2023 06:30  Phos  3.0     03-18  Mg     1.8     03-18      PHYSICAL EXAM:  Gen: AAOx3, non-toxic  Head: NCAT  HEENT: EOMI, oral mucosa moist, normal conjunctiva  Lung: Breathing on RA, unlabored   Abd: soft, NTND, no guarding.   MSK: no visible deformities  Neuro: No focal sensory or motor deficits  
TEAM [ Red ] Surgery Daily Progress Note  =====================================================    SUBJECTIVE: No acute events overnight. Patient seen and examined at bedside on AM rounds. Patient reports that they're feeling well. NPO.      ALLERGIES:  No Known Allergies      --------------------------------------------------------------------------------------    MEDICATIONS:    Neurologic Medications    Respiratory Medications    Cardiovascular Medications  losartan 50 milliGRAM(s) Oral daily    Gastrointestinal Medications  dextrose 5% + sodium chloride 0.9%. 1000 milliLiter(s) IV Continuous <Continuous>  dextrose 5%. 1000 milliLiter(s) IV Continuous <Continuous>  dextrose 5%. 1000 milliLiter(s) IV Continuous <Continuous>  pantoprazole    Tablet 40 milliGRAM(s) Oral before breakfast    Genitourinary Medications  tamsulosin 0.4 milliGRAM(s) Oral at bedtime    Hematologic/Oncologic Medications  enoxaparin Injectable 40 milliGRAM(s) SubCutaneous every 24 hours    Antimicrobial/Immunologic Medications    Endocrine/Metabolic Medications  dextrose 50% Injectable 25 Gram(s) IV Push once  dextrose 50% Injectable 12.5 Gram(s) IV Push once  dextrose 50% Injectable 25 Gram(s) IV Push once  dextrose Oral Gel 15 Gram(s) Oral once PRN Blood Glucose LESS THAN 70 milliGRAM(s)/deciliter  finasteride 5 milliGRAM(s) Oral daily  glucagon  Injectable 1 milliGRAM(s) IntraMuscular once  insulin lispro (ADMELOG) corrective regimen sliding scale   SubCutaneous Before meals and at bedtime  levothyroxine 125 MICROGram(s) Oral daily    Topical/Other Medications    --------------------------------------------------------------------------------------    VITAL SIGNS:    Vital Signs Last 24 Hrs  T(C): 36.6 (17 Mar 2023 05:52), Max: 37.1 (16 Mar 2023 09:04)  T(F): 97.8 (17 Mar 2023 05:52), Max: 98.7 (16 Mar 2023 09:04)  HR: 77 (17 Mar 2023 05:52) (60 - 88)  BP: 138/80 (17 Mar 2023 05:52) (106/64 - 148/76)  BP(mean): --  RR: 18 (17 Mar 2023 05:52) (18 - 18)  SpO2: 97% (17 Mar 2023 05:52) (95% - 97%)    Parameters below as of 17 Mar 2023 05:52  Patient On (Oxygen Delivery Method): room air      --------------------------------------------------------------------------------------    EXAM    General: NAD, resting in bed comfortably.  Cardiac: regular rate, warm and well perfused  Respiratory: Nonlabored respirations, normal cw expansion.  Abdomen: soft, nontender, nondistended. Ventral hernia is reducible and non-tender.  Extremities: normal strength, FROM, no deformities    --------------------------------------------------------------------------------------    LABS    .  LABS:                         12.4   5.31  )-----------( 247      ( 17 Mar 2023 06:49 )             39.2     03-16    130<L>  |  95<L>  |  17  ----------------------------<  92  3.5   |  23  |  0.77    Ca    8.6      16 Mar 2023 06:59    TPro  6.2  /  Alb  3.6  /  TBili  0.7  /  DBili  x   /  AST  22  /  ALT  25  /  AlkPhos  109  03-16    PT/INR - ( 16 Mar 2023 07:01 )   PT: 12.6 sec;   INR: 1.09 ratio         PTT - ( 16 Mar 2023 07:01 )  PTT:28.4 sec          RADIOLOGY, EKG & ADDITIONAL TESTS: Reviewed. 
TEAM [ Red ] Surgery Daily Progress Note  =====================================================    SUBJECTIVE: No acute overnight events. Patient seen and examined at bedside on AM rounds. Patient reports that they're feeling well. Tolerating clear liquid diet, denies nausea, vomiting.  +ve  Flatus/  +ve  BM. OOB/Amublating as tolerated. Denies fever, chills.    ALLERGIES:  No Known Allergies      --------------------------------------------------------------------------------------    MEDICATIONS:    Neurologic Medications  acetaminophen     Tablet .. 975 milliGRAM(s) Oral every 6 hours  oxyCODONE    IR 2.5 milliGRAM(s) Oral every 6 hours PRN Moderate Pain (4 - 6)  oxyCODONE    IR 5 milliGRAM(s) Oral every 6 hours PRN Severe Pain (7 - 10)    Respiratory Medications    Cardiovascular Medications  losartan 50 milliGRAM(s) Oral daily    Gastrointestinal Medications  dextrose 5%. 1000 milliLiter(s) IV Continuous <Continuous>  dextrose 5%. 1000 milliLiter(s) IV Continuous <Continuous>  pantoprazole    Tablet 40 milliGRAM(s) Oral before breakfast    Genitourinary Medications  tamsulosin 0.4 milliGRAM(s) Oral at bedtime    Hematologic/Oncologic Medications  enoxaparin Injectable 40 milliGRAM(s) SubCutaneous every 24 hours    Antimicrobial/Immunologic Medications    Endocrine/Metabolic Medications  dextrose 50% Injectable 25 Gram(s) IV Push once  dextrose 50% Injectable 12.5 Gram(s) IV Push once  dextrose 50% Injectable 25 Gram(s) IV Push once  dextrose Oral Gel 15 Gram(s) Oral once PRN Blood Glucose LESS THAN 70 milliGRAM(s)/deciliter  finasteride 5 milliGRAM(s) Oral daily  glucagon  Injectable 1 milliGRAM(s) IntraMuscular once  insulin lispro (ADMELOG) corrective regimen sliding scale   SubCutaneous Before meals and at bedtime  levothyroxine 125 MICROGram(s) Oral daily    Topical/Other Medications    --------------------------------------------------------------------------------------    VITAL SIGNS:    Vital Signs Last 24 Hrs  T(C): 36.7 (19 Mar 2023 05:35), Max: 36.9 (18 Mar 2023 22:18)  T(F): 98 (19 Mar 2023 05:35), Max: 98.5 (18 Mar 2023 22:18)  HR: 72 (19 Mar 2023 05:35) (69 - 76)  BP: 142/78 (19 Mar 2023 05:35) (142/78 - 176/99)  BP(mean): --  RR: 18 (19 Mar 2023 05:35) (18 - 18)  SpO2: 96% (19 Mar 2023 05:35) (95% - 98%)    Parameters below as of 19 Mar 2023 05:35  Patient On (Oxygen Delivery Method): room air      --------------------------------------------------------------------------------------    EXAM    General: NAD, resting in bed comfortably.  Cardiac: regular rate, warm and well perfused  Respiratory: Nonlabored respirations, normal cw expansion.  Abdomen: soft, nontender, nondistended.  Extremities: normal strength, FROM, no deformities    --------------------------------------------------------------------------------------  LABS    .  LABS:                         12.2   6.53  )-----------( 254      ( 18 Mar 2023 06:29 )             38.3     03-18    134<L>  |  98  |  9   ----------------------------<  111<H>  3.5   |  22  |  0.73    Ca    8.2<L>      18 Mar 2023 06:30  Phos  3.0     03-18  Mg     1.8     03-18                RADIOLOGY, EKG & ADDITIONAL TESTS: Reviewed. -

## 2023-03-19 NOTE — DISCHARGE NOTE NURSING/CASE MANAGEMENT/SOCIAL WORK - PATIENT PORTAL LINK FT
You can access the FollowMyHealth Patient Portal offered by [facility] by registering at the following website: [email or web address]. By joining [facility]’s FollowMyHealth portal, you will also be able to view your health information using other applications (apps) compatible with our system.

## 2023-03-19 NOTE — DISCHARGE NOTE PROVIDER - CARE PROVIDER_API CALL
Cleve Saldivar)  Surgery  [address] [place] [address]  Phone: [phone]  Fax: [phone]  Established Patient  Follow Up Time: 2 weeks

## 2023-03-19 NOTE — PROGRESS NOTE ADULT - ASSESSMENT
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  Surgery consulted for management of SBO with TP within ventral hernia sac. Now s/p ventral hernia repair w/mesh (3/17)    Plan:  - NPO adv to clears  - Pain management  - Activity as tolerated  - DVT PPx  - OOB and ambulate as sujata   - Home rx, hx of BPH     Red Team
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 Admitted for SBO.     # SBO with TP within ventral hernia sac  sp Open repair of incisional hernia of anterior abdominal wall using synthetic mesh  diet per surgery  pain control  PT OOB    # Type 2 diabetes mellitus with complication, without long-term current use of insulin.  outpt on metformin 1000mg in AM, hold inpt  a1c 6.7  cont SSI    # Hypothyroidism  c/w Synthroid 125mcg qd    # Essential hypertension, HLD  losartan 50mg qd  cont atorvastatin 20mg    # Benign prostatic hyperplasia  c/w Flomax proscar    Heparin SC for DVT ppx    Please call [facility] with questions [phone].
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  Surgery consulted for management of SBO with TP within ventral hernia sac. In the ED AVSS, lab workup remarkable for hyponatremia at 129 and lactate 2.3.     Plan:  - NPO  - IVF  - F/U on lactate levels  - Pain management  - Activity as tolerated  - DVT PPx  - OR today    Red Team
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  Surgery consulted for management of SBO with TP within ventral hernia sac. In the ED AVSS, lab workup remarkable for hyponatremia at 129 and lactate 2.3.     - advance to clears  - F/U AM labs  - Monitor urine output  - OOB/amb  - monitor GI function  - PT/OOB  - DVT ppx      Red Team Surgery  [id number]    
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 Admitted for SBO.     # SBO with TP within ventral hernia sac  NPO for hernia repair  medicine pre op eval for OR - no active cardiac conditions,  medically optimized for OR    # Type 2 diabetes mellitus with complication, without long-term current use of insulin.  outpt on metformin 1000mg in AM  a1c 6.7  cont ARNOLD while NPO    # Hypothyroidism  c/w Synthroid 125mcg qd    # Essential hypertension, HLD  losartan 50mg qd  cont atorvastatin 20mg    # Benign prostatic hyperplasia  c/w Flomax proscar    Heparin SC for DVT ppx    Please call [facility] with questions [phone].
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 Admitted for SBO.     # SBO with TP within ventral hernia sac  sp Open repair of incisional hernia of anterior abdominal wall using synthetic mesh  diet per surgery  pain control  PT OOB    # Type 2 diabetes mellitus with complication, without long-term current use of insulin.  outpt on metformin 1000mg in AM, hold inpt  a1c 6.7  cont SSI    # Hypothyroidism  c/w Synthroid 125mcg qd    # Essential hypertension, HLD  losartan 50mg qd  cont atorvastatin 20mg    # Benign prostatic hyperplasia  c/w Flomax proscar    Heparin SC for DVT ppx    Please call [facility] with questions [phone].
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  Surgery consulted for management of SBO with TP within ventral hernia sac. Now s/p ventral hernia repair w/mesh (3/17)    Plan:  - Diet: CLD  - Pain management PRN  - Activity OOB/ambulating  - DVT PPx: Lovenox   - Advance to LRD  - PT recs - no skilled PT needs  - Potential discharge today/tomorrow      Red Surgery

## 2023-03-19 NOTE — DISCHARGE NOTE NURSING/CASE MANAGEMENT/SOCIAL WORK - NSDCVIVACCINE_GEN_ALL_CORE_FT
Tdap; 15-Feb-2014 18:20; Karen Calderon (RN); 11; IntraMuscular; Deltoid Right.; 0.5 milliLiter(s);

## 2023-03-19 NOTE — DISCHARGE NOTE PROVIDER - NSDCMRMEDTOKEN_GEN_ALL_CORE_FT
acetaminophen 650 mg oral tablet, extended release: 1 tab(s) orally every 6 hours   calcium: 600 milligram(s) orally once a day  Centrum Silver oral tablet: 1 tab(s) orally once a day  12/3/19  cinnamon: 1000 milligram(s) orally once a day PM  12/3/19  eyevite: 1 tab(s) orally once a day  12/3/19  finasteride 5 mg oral tablet: 1 tab(s) orally once a day PM  Fish Oil 1200 mg oral capsule: 1 cap(s) orally once a day LD 12/3/19  losartan 50 mg oral tablet: 1 tab(s) orally once a day PM  lovastatin 20 mg oral tablet: 1 tab(s) orally once a day AM  metFORMIN 1000 mg oral tablet: 1 tab(s) orally 2 times a day  Myrbetriq 50 mg oral tablet, extended release: 1 tab(s) orally once a day  omeprazole 20 mg oral delayed release capsule: 1 cap(s) orally once a day AM  Synthroid 125 mcg (0.125 mg) oral tablet: 1 tab(s) orally once a day AM  tamsulosin 0.4 mg oral capsule: 1 cap(s) orally once a day PM

## 2023-03-23 LAB — SURGICAL PATHOLOGY STUDY: SIGNIFICANT CHANGE UP

## 2023-04-03 ENCOUNTER — APPOINTMENT (OUTPATIENT)
Dept: SURGICAL ONCOLOGY | Facility: CLINIC | Age: 83
End: 2023-04-03
Payer: COMMERCIAL

## 2023-04-03 VITALS
HEART RATE: 88 BPM | WEIGHT: 202 LBS | HEIGHT: 76 IN | OXYGEN SATURATION: 97 % | RESPIRATION RATE: 17 BRPM | DIASTOLIC BLOOD PRESSURE: 77 MMHG | BODY MASS INDEX: 24.6 KG/M2 | SYSTOLIC BLOOD PRESSURE: 134 MMHG | TEMPERATURE: 97.6 F

## 2023-04-03 PROCEDURE — 99024 POSTOP FOLLOW-UP VISIT: CPT

## 2023-04-03 NOTE — ASSESSMENT
[FreeTextEntry1] : Alphonse is healing well post incisional hernia repair.  He will follow-up with us in 6 weeks.  He will not do any heavy lifting for the next 6 weeks.

## 2023-04-03 NOTE — CONSULT LETTER
[FreeTextEntry2] : Mandeep Conley MD  [FreeTextEntry3] : Cleve Saldivar MD\par Surgical Oncology\par [facility]\par Office: [phone]\par Cell: [phone]\par

## 2023-04-03 NOTE — HISTORY OF PRESENT ILLNESS
[de-identified] : Alphonse is a pleasant 83 year-old male here for a post op visit \par \par He was initially seen in consultation on 11/7/19. He experienced an UTI and was referred for an ultrasound per his urologist with suggestion of a mass. 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 Mesenteric vessels are seen around the mass. Suggested differentials include carcinoid, lymphoma or neurogenic tumor. A chest CT on 10/24/19 showed no evidence of metastatic disease. 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 \par Dr. Lozada performed an EGD and colonoscopy on May 2017 with benign findings including 3 colonic polyps. \par \par His past medical history includes DM II, hypertension, hyperlipidemia, DDD, BPH and hypothyroidism. Last year he was diagnosed with a liver abscess which was drained at [facility] and treated with antibiotics (patient was worked up by ID, ultimately felt to be secondary to food borne illness). Past surgical history includes a partial thyroidectomy in 1999 for a benign condition, and back surgery for spinal stenosis (hardware in place). His records indicate that he suffered a subarachnoid hemorrhage, but patient states he had slipped and fallen in 2014 and a CT of his brain showed a miniscule bleed. He denies any loss of consciousness at that time, but states that as part of syncope work up, cardiology implanted a cardiac monitoring device which was ultimately removed with no abnormal findings. He takes baby aspirin daily for prophylaxis but has stopped recently in anticipation of surgical procedure. He denies any family history of malignancies. He denies alcohol or tobacco use. \par \par **SURGERY**\par He underwent a laparoscopic assisted resection of abdominal mass, small bowel resection, umbilical hernia repair and liver biopsy on 12/11/19. 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 There are 12 mitotic figures per 10 high power fields.\par \par CT A&P performed on 12/01/20 revealed No findings to suggest recurrent leiomyosarcoma. New right paraumbilical hernia containing a mildly dilated loop of small bowel. Small hiatal hernia. \par \par CT A&P 6/21/21 demonstrated hepatic steatosis, cholelithiasis, periumbilical hernia containing small bowel without evidence of obstruction.  Mild focal dilatation of the anastomotic small bowel loop measuring 4.3 cm without adjacent bowel wall thickening.\par \par CT A&P 12/7/21 demonstrated a 1.7 cm lesion in the liver, not seen on prior study which may be secondary to heterogeneous fat deposition. Liver MRI with contrast is recommended for further evaluation.  Cholelithiasis, abdominal wall hernia containing non obstructed loops of small bowel. \par \par MRI abdomen performed 12/16/21 revealed several scattered hepatic lesions measuring up to 2.2 cm, the larger of these demonstrating diffusion restriction and progressive enhancement which are indeterminate but suspicious, especially given interval progression and history of leiomyosarcoma (recommend PET/CT).  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  We discussed the role of multimodal therapy, and the potential role of resection of isolated metastases in sarcoma.  I will be obtaining his recent MRI.  He and his family are also seeking additional opinions and possible a clinical trial in [place].  \par \par PET/CT 1/2022 showed increased FDG avid soft tissue in the left posterior nasal cavity, indeterminate. Increased FDG activity associated w/ fat/fluid in the right side of the ventral hernia.\par \par 1/27/2022 - spoke with patient and wife regarding PET/CT results, he has an establish ENT will follow up. No obstructive symptoms or pain at hernia site \par 2/7/2022- Emailed patients wife, recommendation for MRI lungs, ordered. he's considering clinical trial options \par \par s/p hernia sac excision on 3/17/23: connective tissue with extensive reactive changes including myofibroblastic proliferation, acute and chronic inflammation and hemosiderin deposition.

## 2023-05-04 ENCOUNTER — APPOINTMENT (OUTPATIENT)
Dept: SURGICAL ONCOLOGY | Facility: CLINIC | Age: 83
End: 2023-05-04
Payer: MEDICARE

## 2023-05-04 VITALS
BODY MASS INDEX: 24.6 KG/M2 | HEART RATE: 82 BPM | OXYGEN SATURATION: 98 % | HEIGHT: 76 IN | DIASTOLIC BLOOD PRESSURE: 80 MMHG | WEIGHT: 202 LBS | RESPIRATION RATE: 16 BRPM | SYSTOLIC BLOOD PRESSURE: 130 MMHG

## 2023-05-04 DIAGNOSIS — C49.9 SECONDARY MALIGNANT NEOPLASM OF LIVER AND INTRAHEPATIC BILE DUCT: ICD-10-CM

## 2023-05-04 DIAGNOSIS — K43.2 INCISIONAL HERNIA W/OUT OBSTRUCTION OR GANGRENE: ICD-10-CM

## 2023-05-04 DIAGNOSIS — C78.7 SECONDARY MALIGNANT NEOPLASM OF LIVER AND INTRAHEPATIC BILE DUCT: ICD-10-CM

## 2023-05-04 PROCEDURE — 99212 OFFICE O/P EST SF 10 MIN: CPT

## 2023-05-04 NOTE — CONSULT LETTER
[Dear  ___] : Dear ~REGINO, [Consult Letter:] : I had the pleasure of evaluating your patient, [unfilled]. [Please see my note below.] : Please see my note below. [Consult Closing:] : Thank you very much for allowing me to participate in the care of this patient.  If you have any questions, please do not hesitate to contact me. [Sincerely,] : Sincerely, [FreeTextEntry2] : Mandeep Conley MD  [FreeTextEntry3] : Cleve Saldivar MD\par Surgical Oncology\par [facility]\par Office: [phone]\par Cell: [phone]\par

## 2023-05-04 NOTE — HISTORY OF PRESENT ILLNESS
[de-identified] : Alphonse is a pleasant 83 year-old male here for a post op visit \par \par He was initially seen in consultation on 11/7/19. He experienced an UTI and was referred for an ultrasound per his urologist with suggestion of a mass. 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 Mesenteric vessels are seen around the mass. Suggested differentials include carcinoid, lymphoma or neurogenic tumor. A chest CT on 10/24/19 showed no evidence of metastatic disease. 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 \par Dr. Lozada performed an EGD and colonoscopy on May 2017 with benign findings including 3 colonic polyps. \par \par His past medical history includes DM II, hypertension, hyperlipidemia, DDD, BPH and hypothyroidism. Last year he was diagnosed with a liver abscess which was drained at [facility] and treated with antibiotics (patient was worked up by ID, ultimately felt to be secondary to food borne illness). Past surgical history includes a partial thyroidectomy in 1999 for a benign condition, and back surgery for spinal stenosis (hardware in place). His records indicate that he suffered a subarachnoid hemorrhage, but patient states he had slipped and fallen in 2014 and a CT of his brain showed a miniscule bleed. He denies any loss of consciousness at that time, but states that as part of syncope work up, cardiology implanted a cardiac monitoring device which was ultimately removed with no abnormal findings. He takes baby aspirin daily for prophylaxis but has stopped recently in anticipation of surgical procedure. He denies any family history of malignancies. He denies alcohol or tobacco use. \par \par **SURGERY**\par He underwent a laparoscopic assisted resection of abdominal mass, small bowel resection, umbilical hernia repair and liver biopsy on 12/11/19. 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 There are 12 mitotic figures per 10 high power fields.\par \par CT A&P performed on 12/01/20 revealed No findings to suggest recurrent leiomyosarcoma. New right paraumbilical hernia containing a mildly dilated loop of small bowel. Small hiatal hernia. \par \par CT A&P 6/21/21 demonstrated hepatic steatosis, cholelithiasis, periumbilical hernia containing small bowel without evidence of obstruction.  Mild focal dilatation of the anastomotic small bowel loop measuring 4.3 cm without adjacent bowel wall thickening.\par \par CT A&P 12/7/21 demonstrated a 1.7 cm lesion in the liver, not seen on prior study which may be secondary to heterogeneous fat deposition. Liver MRI with contrast is recommended for further evaluation.  Cholelithiasis, abdominal wall hernia containing non obstructed loops of small bowel. \par \par MRI abdomen performed 12/16/21 revealed several scattered hepatic lesions measuring up to 2.2 cm, the larger of these demonstrating diffusion restriction and progressive enhancement which are indeterminate but suspicious, especially given interval progression and history of leiomyosarcoma (recommend PET/CT).  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  We discussed the role of multimodal therapy, and the potential role of resection of isolated metastases in sarcoma.  I will be obtaining his recent MRI.  He and his family are also seeking additional opinions and possible a clinical trial in [place].  \par \par PET/CT 1/2022 showed increased FDG avid soft tissue in the left posterior nasal cavity, indeterminate. Increased FDG activity associated w/ fat/fluid in the right side of the ventral hernia.\par \par \par **SURGERY**\par s/p hernia sac excision on 3/17/23: connective tissue with extensive reactive changes including myofibroblastic proliferation, acute and chronic inflammation and hemosiderin deposition. \par \par 4/3/2023- Alphonse is healing well post incisional hernia repair.  He will follow-up with us in 6 weeks.  He will not do any heavy lifting for the next 6 weeks.\par \par 5/4/2023- Alphonse continues to recover appropriately from his hernia repair. He continues to see his medical oncologist, Dr. Leon Garcia @ [facility]. His most recent imaging consists of an MR abdomen from 5/2 that shows decreased hepatic mets and a CT chest with no evidence of chest mets.   Alphonse is returned to his baseline activities.  He feels well.  His eating is improved.

## 2023-08-14 NOTE — H&P ADULT - PROBLEM/PLAN-2
please follow instructions as provided to you in dr jauregui's instruction packet DISPLAY PLAN FREE TEXT

## 2025-06-17 ENCOUNTER — APPOINTMENT (OUTPATIENT)
Dept: ORTHOPEDIC SURGERY | Facility: CLINIC | Age: 85
End: 2025-06-17
Payer: MEDICARE

## 2025-06-17 ENCOUNTER — RESULT CHARGE (OUTPATIENT)
Age: 85
End: 2025-06-17

## 2025-06-17 VITALS — BODY MASS INDEX: 24.6 KG/M2 | WEIGHT: 202 LBS | HEIGHT: 76 IN

## 2025-06-17 PROBLEM — C80.1 CANCER: Status: RESOLVED | Noted: 2025-06-17 | Resolved: 2025-06-17

## 2025-06-17 PROCEDURE — 73562 X-RAY EXAM OF KNEE 3: CPT | Mod: 50

## 2025-06-17 PROCEDURE — 99203 OFFICE O/P NEW LOW 30 MIN: CPT | Mod: 25

## 2025-06-17 PROCEDURE — 20610 DRAIN/INJ JOINT/BURSA W/O US: CPT | Mod: 50

## 2025-06-17 RX ORDER — MIRABEGRON 50 MG/1
50 TABLET, EXTENDED RELEASE ORAL
Refills: 0 | Status: ACTIVE | COMMUNITY

## 2025-06-17 RX ORDER — FINASTERIDE 5 MG/1
5 TABLET, FILM COATED ORAL
Refills: 0 | Status: ACTIVE | COMMUNITY

## 2025-07-15 ENCOUNTER — APPOINTMENT (OUTPATIENT)
Dept: ORTHOPEDIC SURGERY | Facility: CLINIC | Age: 85
End: 2025-07-15
Payer: COMMERCIAL

## 2025-07-15 VITALS — WEIGHT: 202 LBS | HEIGHT: 76 IN | BODY MASS INDEX: 24.6 KG/M2

## 2025-07-15 PROCEDURE — 99214 OFFICE O/P EST MOD 30 MIN: CPT | Mod: 25

## 2025-07-15 PROCEDURE — 20611 DRAIN/INJ JOINT/BURSA W/US: CPT | Mod: 50

## 2025-07-22 ENCOUNTER — APPOINTMENT (OUTPATIENT)
Dept: ORTHOPEDIC SURGERY | Facility: CLINIC | Age: 85
End: 2025-07-22
Payer: MEDICARE

## 2025-07-22 VITALS — HEIGHT: 76 IN | WEIGHT: 202 LBS | BODY MASS INDEX: 24.6 KG/M2

## 2025-07-22 DIAGNOSIS — M17.12 UNILATERAL PRIMARY OSTEOARTHRITIS, LEFT KNEE: ICD-10-CM

## 2025-07-22 DIAGNOSIS — M17.11 UNILATERAL PRIMARY OSTEOARTHRITIS, RIGHT KNEE: ICD-10-CM

## 2025-07-22 PROCEDURE — 20611 DRAIN/INJ JOINT/BURSA W/US: CPT | Mod: 50

## 2025-07-29 ENCOUNTER — APPOINTMENT (OUTPATIENT)
Dept: ORTHOPEDIC SURGERY | Facility: CLINIC | Age: 85
End: 2025-07-29
Payer: COMMERCIAL

## 2025-07-29 DIAGNOSIS — M17.0 BILATERAL PRIMARY OSTEOARTHRITIS OF KNEE: ICD-10-CM

## 2025-07-29 PROCEDURE — 99213 OFFICE O/P EST LOW 20 MIN: CPT | Mod: 25

## 2025-07-29 PROCEDURE — 20611 DRAIN/INJ JOINT/BURSA W/US: CPT | Mod: 50

## 2025-09-09 ENCOUNTER — APPOINTMENT (OUTPATIENT)
Dept: ORTHOPEDIC SURGERY | Facility: CLINIC | Age: 85
End: 2025-09-09

## 2025-09-09 VITALS — HEIGHT: 76 IN | BODY MASS INDEX: 24.6 KG/M2 | WEIGHT: 202 LBS

## 2025-09-09 DIAGNOSIS — M17.0 BILATERAL PRIMARY OSTEOARTHRITIS OF KNEE: ICD-10-CM

## 2025-09-09 PROCEDURE — 20610 DRAIN/INJ JOINT/BURSA W/O US: CPT | Mod: 50

## 2025-09-09 PROCEDURE — J3490M: CUSTOM | Mod: NC

## 2025-09-09 PROCEDURE — 99214 OFFICE O/P EST MOD 30 MIN: CPT | Mod: 25

## 2025-09-16 ENCOUNTER — APPOINTMENT (OUTPATIENT)
Dept: ORTHOPEDIC SURGERY | Facility: CLINIC | Age: 85
End: 2025-09-16

## (undated) DEVICE — SPECIMEN CONTAINER 100ML

## (undated) DEVICE — GLV 7.5 PROTEXIS (WHITE)

## (undated) DEVICE — LAP PAD 18 X 18"

## (undated) DEVICE — SUT PDS II 1 48" TP-1

## (undated) DEVICE — DRSG OPSITE 13.75 X 4"

## (undated) DEVICE — GLV 8 PROTEXIS (WHITE)

## (undated) DEVICE — SUT SOFSILK 2-0 18" V-20 (POP-OFF)

## (undated) DEVICE — Device

## (undated) DEVICE — PACK MAJOR ABDOMINAL SUPINE

## (undated) DEVICE — DRAPE TOWEL BLUE 17" X 24"

## (undated) DEVICE — GLV 7 PROTEXIS (WHITE)

## (undated) DEVICE — WARMING BLANKET LOWER ADULT

## (undated) DEVICE — POSITIONER FOAM EGG CRATE ULNAR 2PCS (PINK)

## (undated) DEVICE — FOLEY TRAY 16FR 5CC LTX UMETER CLOSED

## (undated) DEVICE — LIGASURE IMPACT

## (undated) DEVICE — BLADE SCALPEL SAFETYLOCK #15

## (undated) DEVICE — BLADE SCALPEL SAFETYLOCK #10

## (undated) DEVICE — VENODYNE/SCD SLEEVE CALF LARGE

## (undated) DEVICE — GLV 6.5 PROTEXIS (WHITE)

## (undated) DEVICE — DRAPE INSTRUMENT POUCH 6.75" X 11"

## (undated) DEVICE — SUT SOFSILK 3-0 18" V-20 (POP-OFF)

## (undated) DEVICE — GOWN TRIMAX LG

## (undated) DEVICE — SOL IRR POUR NS 0.9% 500ML

## (undated) DEVICE — PREP CHLORAPREP HI-LITE ORANGE 26ML

## (undated) DEVICE — GLV 8.5 PROTEXIS (WHITE)

## (undated) DEVICE — PACK BASIN SPECIAL PROCEDURE

## (undated) DEVICE — VISITEC 4X4

## (undated) DEVICE — DRAPE MAYO STAND 30"

## (undated) DEVICE — MEDICATION LABELS W MARKER

## (undated) DEVICE — STAPLER COVIDIEN ENDO GIA STANDARD HANDLE

## (undated) DEVICE — WARMING BLANKET UPPER ADULT

## (undated) DEVICE — SOL IRR POUR H2O 250ML

## (undated) DEVICE — STAPLER SKIN VISI-STAT 35 WIDE

## (undated) DEVICE — MARKING PEN W RULER